# Patient Record
Sex: MALE | Race: WHITE | NOT HISPANIC OR LATINO | Employment: OTHER | URBAN - METROPOLITAN AREA
[De-identification: names, ages, dates, MRNs, and addresses within clinical notes are randomized per-mention and may not be internally consistent; named-entity substitution may affect disease eponyms.]

---

## 2017-01-05 ENCOUNTER — HOSPITAL ENCOUNTER (EMERGENCY)
Facility: HOSPITAL | Age: 82
Discharge: HOME | End: 2017-01-05
Attending: EMERGENCY MEDICINE | Admitting: EMERGENCY MEDICINE
Payer: MEDICARE

## 2017-01-05 VITALS
SYSTOLIC BLOOD PRESSURE: 156 MMHG | WEIGHT: 165 LBS | DIASTOLIC BLOOD PRESSURE: 67 MMHG | HEART RATE: 58 BPM | TEMPERATURE: 97.5 F | OXYGEN SATURATION: 96 % | RESPIRATION RATE: 18 BRPM

## 2017-01-05 DIAGNOSIS — K64.8 INTERNAL HEMORRHOID: ICD-10-CM

## 2017-01-05 DIAGNOSIS — K62.5 RECTAL BLEEDING: Primary | ICD-10-CM

## 2017-01-05 LAB
APTT PPP: 36 SECONDS (ref 24–36)
BASOPHILS # BLD AUTO: 0 THOUSANDS/ΜL (ref 0–0.1)
BASOPHILS NFR BLD AUTO: 0 % (ref 0–1)
EOSINOPHIL # BLD AUTO: 0.04 THOUSAND/ΜL (ref 0–0.61)
EOSINOPHIL NFR BLD AUTO: 1 % (ref 0–6)
ERYTHROCYTE [DISTWIDTH] IN BLOOD BY AUTOMATED COUNT: 13.1 % (ref 11.6–15.1)
HCT VFR BLD AUTO: 35.9 % (ref 36.5–49.3)
HGB BLD-MCNC: 12.5 G/DL (ref 12–17)
INR PPP: 2.28 (ref 0.86–1.16)
LYMPHOCYTES # BLD AUTO: 0.73 THOUSANDS/ΜL (ref 0.6–4.47)
LYMPHOCYTES NFR BLD AUTO: 15 % (ref 14–44)
MCH RBC QN AUTO: 32.3 PG (ref 26.8–34.3)
MCHC RBC AUTO-ENTMCNC: 34.8 G/DL (ref 31.4–37.4)
MCV RBC AUTO: 93 FL (ref 82–98)
MONOCYTES # BLD AUTO: 0.35 THOUSAND/ΜL (ref 0.17–1.22)
MONOCYTES NFR BLD AUTO: 7 % (ref 4–12)
NEUTROPHILS # BLD AUTO: 3.82 THOUSANDS/ΜL (ref 1.85–7.62)
NEUTS SEG NFR BLD AUTO: 77 % (ref 43–75)
NRBC BLD AUTO-RTO: 0 /100 WBCS
PLATELET # BLD AUTO: 198 THOUSANDS/UL (ref 149–390)
PMV BLD AUTO: 9.2 FL (ref 8.9–12.7)
PROTHROMBIN TIME: 24.8 SECONDS (ref 12–14.3)
RBC # BLD AUTO: 3.87 MILLION/UL (ref 3.88–5.62)
WBC # BLD AUTO: 4.96 THOUSAND/UL (ref 4.31–10.16)

## 2017-01-05 PROCEDURE — 85610 PROTHROMBIN TIME: CPT | Performed by: EMERGENCY MEDICINE

## 2017-01-05 PROCEDURE — 36415 COLL VENOUS BLD VENIPUNCTURE: CPT | Performed by: EMERGENCY MEDICINE

## 2017-01-05 PROCEDURE — 85025 COMPLETE CBC W/AUTO DIFF WBC: CPT | Performed by: EMERGENCY MEDICINE

## 2017-01-05 PROCEDURE — 99285 EMERGENCY DEPT VISIT HI MDM: CPT

## 2017-01-05 PROCEDURE — 85730 THROMBOPLASTIN TIME PARTIAL: CPT | Performed by: EMERGENCY MEDICINE

## 2017-01-13 ENCOUNTER — APPOINTMENT (OUTPATIENT)
Dept: LAB | Facility: CLINIC | Age: 82
End: 2017-01-13
Payer: MEDICARE

## 2017-01-13 ENCOUNTER — TRANSCRIBE ORDERS (OUTPATIENT)
Dept: LAB | Facility: CLINIC | Age: 82
End: 2017-01-13

## 2017-01-13 ENCOUNTER — GENERIC CONVERSION - ENCOUNTER (OUTPATIENT)
Dept: OTHER | Facility: OTHER | Age: 82
End: 2017-01-13

## 2017-01-13 DIAGNOSIS — I48.0 PAROXYSMAL ATRIAL FIBRILLATION (HCC): Primary | ICD-10-CM

## 2017-01-13 LAB
INR PPP: 1.95 (ref 0.86–1.16)
PROTHROMBIN TIME: 22.1 SECONDS (ref 12–14.3)

## 2017-01-13 PROCEDURE — 36415 COLL VENOUS BLD VENIPUNCTURE: CPT

## 2017-01-13 PROCEDURE — 85610 PROTHROMBIN TIME: CPT

## 2017-01-16 ENCOUNTER — GENERIC CONVERSION - ENCOUNTER (OUTPATIENT)
Dept: OTHER | Facility: OTHER | Age: 82
End: 2017-01-16

## 2017-01-18 ENCOUNTER — GENERIC CONVERSION - ENCOUNTER (OUTPATIENT)
Dept: OTHER | Facility: OTHER | Age: 82
End: 2017-01-18

## 2017-01-19 ENCOUNTER — ALLSCRIPTS OFFICE VISIT (OUTPATIENT)
Dept: OTHER | Facility: OTHER | Age: 82
End: 2017-01-19

## 2017-01-26 ENCOUNTER — ALLSCRIPTS OFFICE VISIT (OUTPATIENT)
Dept: OTHER | Facility: OTHER | Age: 82
End: 2017-01-26

## 2017-01-30 ENCOUNTER — GENERIC CONVERSION - ENCOUNTER (OUTPATIENT)
Dept: OTHER | Facility: OTHER | Age: 82
End: 2017-01-30

## 2017-01-30 ENCOUNTER — APPOINTMENT (OUTPATIENT)
Dept: LAB | Facility: CLINIC | Age: 82
End: 2017-01-30
Payer: MEDICARE

## 2017-01-30 DIAGNOSIS — I48.0 PAROXYSMAL ATRIAL FIBRILLATION (HCC): ICD-10-CM

## 2017-01-30 LAB
INR PPP: 2.95 (ref 0.86–1.16)
PROTHROMBIN TIME: 30.2 SECONDS (ref 12–14.3)

## 2017-01-30 PROCEDURE — 85610 PROTHROMBIN TIME: CPT

## 2017-01-30 PROCEDURE — 36415 COLL VENOUS BLD VENIPUNCTURE: CPT

## 2017-01-31 ENCOUNTER — GENERIC CONVERSION - ENCOUNTER (OUTPATIENT)
Dept: OTHER | Facility: OTHER | Age: 82
End: 2017-01-31

## 2017-02-13 ENCOUNTER — GENERIC CONVERSION - ENCOUNTER (OUTPATIENT)
Dept: OTHER | Facility: OTHER | Age: 82
End: 2017-02-13

## 2017-02-13 ENCOUNTER — APPOINTMENT (OUTPATIENT)
Dept: LAB | Facility: CLINIC | Age: 82
End: 2017-02-13
Payer: MEDICARE

## 2017-02-13 DIAGNOSIS — I48.0 PAROXYSMAL ATRIAL FIBRILLATION (HCC): ICD-10-CM

## 2017-02-13 LAB
INR PPP: 1.84 (ref 0.86–1.16)
PROTHROMBIN TIME: 21.1 SECONDS (ref 12–14.3)

## 2017-02-13 PROCEDURE — 85610 PROTHROMBIN TIME: CPT

## 2017-02-13 PROCEDURE — 36415 COLL VENOUS BLD VENIPUNCTURE: CPT

## 2017-02-14 ENCOUNTER — GENERIC CONVERSION - ENCOUNTER (OUTPATIENT)
Dept: OTHER | Facility: OTHER | Age: 82
End: 2017-02-14

## 2017-02-16 DIAGNOSIS — I48.0 PAROXYSMAL ATRIAL FIBRILLATION (HCC): ICD-10-CM

## 2017-02-27 ENCOUNTER — APPOINTMENT (OUTPATIENT)
Dept: LAB | Facility: CLINIC | Age: 82
End: 2017-02-27
Payer: MEDICARE

## 2017-02-27 DIAGNOSIS — I48.0 PAROXYSMAL ATRIAL FIBRILLATION (HCC): ICD-10-CM

## 2017-02-27 LAB
INR PPP: 1.87 (ref 0.86–1.16)
PROTHROMBIN TIME: 21.4 SECONDS (ref 12–14.3)

## 2017-02-27 PROCEDURE — 36415 COLL VENOUS BLD VENIPUNCTURE: CPT

## 2017-02-27 PROCEDURE — 85610 PROTHROMBIN TIME: CPT

## 2017-02-28 ENCOUNTER — GENERIC CONVERSION - ENCOUNTER (OUTPATIENT)
Dept: OTHER | Facility: OTHER | Age: 82
End: 2017-02-28

## 2017-03-20 ENCOUNTER — GENERIC CONVERSION - ENCOUNTER (OUTPATIENT)
Dept: OTHER | Facility: OTHER | Age: 82
End: 2017-03-20

## 2017-03-20 ENCOUNTER — APPOINTMENT (OUTPATIENT)
Dept: LAB | Facility: CLINIC | Age: 82
End: 2017-03-20
Payer: MEDICARE

## 2017-03-20 DIAGNOSIS — I48.0 PAROXYSMAL ATRIAL FIBRILLATION (HCC): ICD-10-CM

## 2017-03-20 LAB
INR PPP: 2.86 (ref 0.86–1.16)
PROTHROMBIN TIME: 29.5 SECONDS (ref 12–14.3)

## 2017-03-20 PROCEDURE — 85610 PROTHROMBIN TIME: CPT

## 2017-03-20 PROCEDURE — 36415 COLL VENOUS BLD VENIPUNCTURE: CPT

## 2017-03-23 DIAGNOSIS — I48.0 PAROXYSMAL ATRIAL FIBRILLATION (HCC): ICD-10-CM

## 2017-04-04 ENCOUNTER — TRANSCRIBE ORDERS (OUTPATIENT)
Dept: LAB | Facility: CLINIC | Age: 82
End: 2017-04-04

## 2017-04-04 ENCOUNTER — APPOINTMENT (OUTPATIENT)
Dept: LAB | Facility: CLINIC | Age: 82
End: 2017-04-04
Payer: MEDICARE

## 2017-04-04 ENCOUNTER — GENERIC CONVERSION - ENCOUNTER (OUTPATIENT)
Dept: OTHER | Facility: OTHER | Age: 82
End: 2017-04-04

## 2017-04-04 DIAGNOSIS — E78.5 HYPERLIPIDEMIA: ICD-10-CM

## 2017-04-04 DIAGNOSIS — I48.0 PAROXYSMAL ATRIAL FIBRILLATION (HCC): ICD-10-CM

## 2017-04-04 DIAGNOSIS — I10 ESSENTIAL (PRIMARY) HYPERTENSION: ICD-10-CM

## 2017-04-04 DIAGNOSIS — M05.9 SEROPOSITIVE RHEUMATOID ARTHRITIS (HCC): Primary | ICD-10-CM

## 2017-04-04 DIAGNOSIS — E11.9 TYPE 2 DIABETES MELLITUS WITHOUT COMPLICATIONS (HCC): ICD-10-CM

## 2017-04-04 LAB
ALBUMIN SERPL BCP-MCNC: 3.1 G/DL (ref 3.5–5)
ALP SERPL-CCNC: 63 U/L (ref 46–116)
ALT SERPL W P-5'-P-CCNC: 27 U/L (ref 12–78)
ANION GAP SERPL CALCULATED.3IONS-SCNC: 8 MMOL/L (ref 4–13)
AST SERPL W P-5'-P-CCNC: 17 U/L (ref 5–45)
BASOPHILS # BLD AUTO: 0.01 THOUSANDS/ΜL (ref 0–0.1)
BASOPHILS NFR BLD AUTO: 0 % (ref 0–1)
BILIRUB SERPL-MCNC: 0.56 MG/DL (ref 0.2–1)
BUN SERPL-MCNC: 22 MG/DL (ref 5–25)
CALCIUM SERPL-MCNC: 8.5 MG/DL (ref 8.3–10.1)
CHLORIDE SERPL-SCNC: 106 MMOL/L (ref 100–108)
CHOLEST SERPL-MCNC: 134 MG/DL (ref 50–200)
CO2 SERPL-SCNC: 28 MMOL/L (ref 21–32)
CREAT SERPL-MCNC: 0.9 MG/DL (ref 0.6–1.3)
EOSINOPHIL # BLD AUTO: 0.11 THOUSAND/ΜL (ref 0–0.61)
EOSINOPHIL NFR BLD AUTO: 2 % (ref 0–6)
ERYTHROCYTE [DISTWIDTH] IN BLOOD BY AUTOMATED COUNT: 13 % (ref 11.6–15.1)
EST. AVERAGE GLUCOSE BLD GHB EST-MCNC: 137 MG/DL
GFR SERPL CREATININE-BSD FRML MDRD: >60 ML/MIN/1.73SQ M
GLUCOSE P FAST SERPL-MCNC: 108 MG/DL (ref 65–99)
HBA1C MFR BLD: 6.4 % (ref 4.2–6.3)
HCT VFR BLD AUTO: 37.2 % (ref 36.5–49.3)
HDLC SERPL-MCNC: 75 MG/DL (ref 40–60)
HGB BLD-MCNC: 12.8 G/DL (ref 12–17)
INR PPP: 2.6 (ref 0.86–1.16)
LDLC SERPL CALC-MCNC: 52 MG/DL (ref 0–100)
LYMPHOCYTES # BLD AUTO: 1.1 THOUSANDS/ΜL (ref 0.6–4.47)
LYMPHOCYTES NFR BLD AUTO: 20 % (ref 14–44)
MCH RBC QN AUTO: 32.7 PG (ref 26.8–34.3)
MCHC RBC AUTO-ENTMCNC: 34.4 G/DL (ref 31.4–37.4)
MCV RBC AUTO: 95 FL (ref 82–98)
MONOCYTES # BLD AUTO: 0.51 THOUSAND/ΜL (ref 0.17–1.22)
MONOCYTES NFR BLD AUTO: 9 % (ref 4–12)
NEUTROPHILS # BLD AUTO: 3.7 THOUSANDS/ΜL (ref 1.85–7.62)
NEUTS SEG NFR BLD AUTO: 69 % (ref 43–75)
NRBC BLD AUTO-RTO: 0 /100 WBCS
PLATELET # BLD AUTO: 192 THOUSANDS/UL (ref 149–390)
PMV BLD AUTO: 9.5 FL (ref 8.9–12.7)
POTASSIUM SERPL-SCNC: 4 MMOL/L (ref 3.5–5.3)
PROT SERPL-MCNC: 6.3 G/DL (ref 6.4–8.2)
PROTHROMBIN TIME: 27.4 SECONDS (ref 12–14.3)
RBC # BLD AUTO: 3.92 MILLION/UL (ref 3.88–5.62)
SODIUM SERPL-SCNC: 142 MMOL/L (ref 136–145)
TRIGL SERPL-MCNC: 37 MG/DL
WBC # BLD AUTO: 5.44 THOUSAND/UL (ref 4.31–10.16)

## 2017-04-04 PROCEDURE — 80053 COMPREHEN METABOLIC PANEL: CPT

## 2017-04-04 PROCEDURE — 36415 COLL VENOUS BLD VENIPUNCTURE: CPT

## 2017-04-04 PROCEDURE — 80061 LIPID PANEL: CPT

## 2017-04-04 PROCEDURE — 85610 PROTHROMBIN TIME: CPT

## 2017-04-04 PROCEDURE — 85025 COMPLETE CBC W/AUTO DIFF WBC: CPT

## 2017-04-04 PROCEDURE — 83036 HEMOGLOBIN GLYCOSYLATED A1C: CPT

## 2017-04-07 DIAGNOSIS — I10 ESSENTIAL (PRIMARY) HYPERTENSION: ICD-10-CM

## 2017-04-07 DIAGNOSIS — I48.0 PAROXYSMAL ATRIAL FIBRILLATION (HCC): ICD-10-CM

## 2017-04-07 DIAGNOSIS — E11.9 TYPE 2 DIABETES MELLITUS WITHOUT COMPLICATIONS (HCC): ICD-10-CM

## 2017-04-07 DIAGNOSIS — E78.5 HYPERLIPIDEMIA: ICD-10-CM

## 2017-04-11 ENCOUNTER — ALLSCRIPTS OFFICE VISIT (OUTPATIENT)
Dept: OTHER | Facility: OTHER | Age: 82
End: 2017-04-11

## 2017-04-20 ENCOUNTER — GENERIC CONVERSION - ENCOUNTER (OUTPATIENT)
Dept: OTHER | Facility: OTHER | Age: 82
End: 2017-04-20

## 2017-04-20 ENCOUNTER — APPOINTMENT (OUTPATIENT)
Dept: LAB | Facility: CLINIC | Age: 82
End: 2017-04-20
Payer: MEDICARE

## 2017-04-20 DIAGNOSIS — I48.0 PAROXYSMAL ATRIAL FIBRILLATION (HCC): ICD-10-CM

## 2017-04-20 LAB
INR PPP: 3.19 (ref 0.86–1.16)
PROTHROMBIN TIME: 32 SECONDS (ref 12–14.3)

## 2017-04-20 PROCEDURE — 36415 COLL VENOUS BLD VENIPUNCTURE: CPT

## 2017-04-20 PROCEDURE — 85610 PROTHROMBIN TIME: CPT

## 2017-05-11 ENCOUNTER — GENERIC CONVERSION - ENCOUNTER (OUTPATIENT)
Dept: OTHER | Facility: OTHER | Age: 82
End: 2017-05-11

## 2017-05-11 ENCOUNTER — APPOINTMENT (OUTPATIENT)
Dept: LAB | Facility: CLINIC | Age: 82
End: 2017-05-11
Payer: MEDICARE

## 2017-05-11 DIAGNOSIS — I48.0 PAROXYSMAL ATRIAL FIBRILLATION (HCC): ICD-10-CM

## 2017-05-11 LAB
INR PPP: 2.46 (ref 0.86–1.16)
PROTHROMBIN TIME: 27 SECONDS (ref 12.1–14.4)

## 2017-05-11 PROCEDURE — 36415 COLL VENOUS BLD VENIPUNCTURE: CPT

## 2017-05-11 PROCEDURE — 85610 PROTHROMBIN TIME: CPT

## 2017-05-17 ENCOUNTER — GENERIC CONVERSION - ENCOUNTER (OUTPATIENT)
Dept: OTHER | Facility: OTHER | Age: 82
End: 2017-05-17

## 2017-05-19 ENCOUNTER — GENERIC CONVERSION - ENCOUNTER (OUTPATIENT)
Dept: OTHER | Facility: OTHER | Age: 82
End: 2017-05-19

## 2017-06-01 ENCOUNTER — APPOINTMENT (OUTPATIENT)
Dept: LAB | Facility: CLINIC | Age: 82
End: 2017-06-01
Payer: MEDICARE

## 2017-06-01 ENCOUNTER — GENERIC CONVERSION - ENCOUNTER (OUTPATIENT)
Dept: OTHER | Facility: OTHER | Age: 82
End: 2017-06-01

## 2017-06-01 DIAGNOSIS — I48.0 PAROXYSMAL ATRIAL FIBRILLATION (HCC): ICD-10-CM

## 2017-06-01 LAB
INR PPP: 2.64 (ref 0.86–1.16)
PROTHROMBIN TIME: 28.5 SECONDS (ref 12.1–14.4)

## 2017-06-01 PROCEDURE — 85610 PROTHROMBIN TIME: CPT

## 2017-06-01 PROCEDURE — 36415 COLL VENOUS BLD VENIPUNCTURE: CPT

## 2017-06-15 DIAGNOSIS — I48.0 PAROXYSMAL ATRIAL FIBRILLATION (HCC): ICD-10-CM

## 2017-06-22 ENCOUNTER — GENERIC CONVERSION - ENCOUNTER (OUTPATIENT)
Dept: OTHER | Facility: OTHER | Age: 82
End: 2017-06-22

## 2017-06-22 ENCOUNTER — APPOINTMENT (OUTPATIENT)
Dept: LAB | Facility: CLINIC | Age: 82
End: 2017-06-22
Payer: MEDICARE

## 2017-06-22 DIAGNOSIS — I48.0 PAROXYSMAL ATRIAL FIBRILLATION (HCC): ICD-10-CM

## 2017-06-22 PROCEDURE — 36415 COLL VENOUS BLD VENIPUNCTURE: CPT | Performed by: INTERNAL MEDICINE

## 2017-06-22 PROCEDURE — 85610 PROTHROMBIN TIME: CPT | Performed by: INTERNAL MEDICINE

## 2017-07-04 DIAGNOSIS — I48.0 PAROXYSMAL ATRIAL FIBRILLATION (HCC): ICD-10-CM

## 2017-07-04 DIAGNOSIS — E11.9 TYPE 2 DIABETES MELLITUS WITHOUT COMPLICATIONS (HCC): ICD-10-CM

## 2017-07-06 ENCOUNTER — APPOINTMENT (OUTPATIENT)
Dept: LAB | Facility: CLINIC | Age: 82
End: 2017-07-06
Payer: MEDICARE

## 2017-07-06 ENCOUNTER — TRANSCRIBE ORDERS (OUTPATIENT)
Dept: LAB | Facility: CLINIC | Age: 82
End: 2017-07-06

## 2017-07-06 DIAGNOSIS — E78.5 OTHER AND UNSPECIFIED HYPERLIPIDEMIA: Primary | ICD-10-CM

## 2017-07-06 DIAGNOSIS — E11.9 TYPE 2 DIABETES MELLITUS WITHOUT COMPLICATIONS (HCC): ICD-10-CM

## 2017-07-06 DIAGNOSIS — M05.9 SEROPOSITIVE RHEUMATOID ARTHRITIS (HCC): ICD-10-CM

## 2017-07-06 LAB
ALBUMIN SERPL BCP-MCNC: 3.3 G/DL (ref 3.5–5)
ALP SERPL-CCNC: 64 U/L (ref 46–116)
ALT SERPL W P-5'-P-CCNC: 24 U/L (ref 12–78)
ANION GAP SERPL CALCULATED.3IONS-SCNC: 4 MMOL/L (ref 4–13)
AST SERPL W P-5'-P-CCNC: 19 U/L (ref 5–45)
BASOPHILS # BLD AUTO: 0.01 THOUSANDS/ΜL (ref 0–0.1)
BASOPHILS NFR BLD AUTO: 0 % (ref 0–1)
BILIRUB SERPL-MCNC: 0.37 MG/DL (ref 0.2–1)
BUN SERPL-MCNC: 19 MG/DL (ref 5–25)
CALCIUM SERPL-MCNC: 8.4 MG/DL (ref 8.3–10.1)
CHLORIDE SERPL-SCNC: 107 MMOL/L (ref 100–108)
CO2 SERPL-SCNC: 28 MMOL/L (ref 21–32)
CREAT SERPL-MCNC: 0.86 MG/DL (ref 0.6–1.3)
EOSINOPHIL # BLD AUTO: 0.08 THOUSAND/ΜL (ref 0–0.61)
EOSINOPHIL NFR BLD AUTO: 2 % (ref 0–6)
ERYTHROCYTE [DISTWIDTH] IN BLOOD BY AUTOMATED COUNT: 12.9 % (ref 11.6–15.1)
EST. AVERAGE GLUCOSE BLD GHB EST-MCNC: 148 MG/DL
GFR SERPL CREATININE-BSD FRML MDRD: >60 ML/MIN/1.73SQ M
GLUCOSE P FAST SERPL-MCNC: 112 MG/DL (ref 65–99)
HBA1C MFR BLD: 6.8 % (ref 4.2–6.3)
HCT VFR BLD AUTO: 37.2 % (ref 36.5–49.3)
HGB BLD-MCNC: 12.6 G/DL (ref 12–17)
LDLC SERPL DIRECT ASSAY-MCNC: 56 MG/DL (ref 0–100)
LYMPHOCYTES # BLD AUTO: 1.03 THOUSANDS/ΜL (ref 0.6–4.47)
LYMPHOCYTES NFR BLD AUTO: 21 % (ref 14–44)
MCH RBC QN AUTO: 31.9 PG (ref 26.8–34.3)
MCHC RBC AUTO-ENTMCNC: 33.9 G/DL (ref 31.4–37.4)
MCV RBC AUTO: 94 FL (ref 82–98)
MONOCYTES # BLD AUTO: 0.46 THOUSAND/ΜL (ref 0.17–1.22)
MONOCYTES NFR BLD AUTO: 9 % (ref 4–12)
NEUTROPHILS # BLD AUTO: 3.27 THOUSANDS/ΜL (ref 1.85–7.62)
NEUTS SEG NFR BLD AUTO: 68 % (ref 43–75)
NRBC BLD AUTO-RTO: 0 /100 WBCS
PLATELET # BLD AUTO: 193 THOUSANDS/UL (ref 149–390)
PMV BLD AUTO: 9.2 FL (ref 8.9–12.7)
POTASSIUM SERPL-SCNC: 3.8 MMOL/L (ref 3.5–5.3)
PROT SERPL-MCNC: 6.5 G/DL (ref 6.4–8.2)
RBC # BLD AUTO: 3.95 MILLION/UL (ref 3.88–5.62)
SODIUM SERPL-SCNC: 139 MMOL/L (ref 136–145)
WBC # BLD AUTO: 4.87 THOUSAND/UL (ref 4.31–10.16)

## 2017-07-06 PROCEDURE — 85025 COMPLETE CBC W/AUTO DIFF WBC: CPT

## 2017-07-06 PROCEDURE — 80053 COMPREHEN METABOLIC PANEL: CPT

## 2017-07-06 PROCEDURE — 83036 HEMOGLOBIN GLYCOSYLATED A1C: CPT

## 2017-07-06 PROCEDURE — 36415 COLL VENOUS BLD VENIPUNCTURE: CPT

## 2017-07-06 PROCEDURE — 83721 ASSAY OF BLOOD LIPOPROTEIN: CPT

## 2017-07-24 ENCOUNTER — APPOINTMENT (OUTPATIENT)
Dept: LAB | Facility: CLINIC | Age: 82
End: 2017-07-24
Payer: MEDICARE

## 2017-07-24 ENCOUNTER — GENERIC CONVERSION - ENCOUNTER (OUTPATIENT)
Dept: OTHER | Facility: OTHER | Age: 82
End: 2017-07-24

## 2017-07-24 DIAGNOSIS — I48.0 PAROXYSMAL ATRIAL FIBRILLATION (HCC): ICD-10-CM

## 2017-07-24 LAB
INR PPP: 1.8 (ref 0.86–1.16)
PROTHROMBIN TIME: 21 SECONDS (ref 12.1–14.4)

## 2017-07-24 PROCEDURE — 36415 COLL VENOUS BLD VENIPUNCTURE: CPT

## 2017-07-24 PROCEDURE — 85610 PROTHROMBIN TIME: CPT

## 2017-08-08 ENCOUNTER — GENERIC CONVERSION - ENCOUNTER (OUTPATIENT)
Dept: OTHER | Facility: OTHER | Age: 82
End: 2017-08-08

## 2017-08-08 ENCOUNTER — APPOINTMENT (OUTPATIENT)
Dept: LAB | Facility: CLINIC | Age: 82
End: 2017-08-08
Payer: MEDICARE

## 2017-08-08 DIAGNOSIS — I48.0 PAROXYSMAL ATRIAL FIBRILLATION (HCC): ICD-10-CM

## 2017-08-08 LAB
INR PPP: 2.51 (ref 0.86–1.16)
PROTHROMBIN TIME: 27.4 SECONDS (ref 12.1–14.4)

## 2017-08-08 PROCEDURE — 36415 COLL VENOUS BLD VENIPUNCTURE: CPT

## 2017-08-08 PROCEDURE — 85610 PROTHROMBIN TIME: CPT

## 2017-08-10 DIAGNOSIS — I48.0 PAROXYSMAL ATRIAL FIBRILLATION (HCC): ICD-10-CM

## 2017-08-28 ENCOUNTER — ALLSCRIPTS OFFICE VISIT (OUTPATIENT)
Dept: OTHER | Facility: OTHER | Age: 82
End: 2017-08-28

## 2017-09-08 ENCOUNTER — GENERIC CONVERSION - ENCOUNTER (OUTPATIENT)
Dept: OTHER | Facility: OTHER | Age: 82
End: 2017-09-08

## 2017-09-08 ENCOUNTER — APPOINTMENT (OUTPATIENT)
Dept: LAB | Facility: CLINIC | Age: 82
End: 2017-09-08
Payer: MEDICARE

## 2017-09-08 DIAGNOSIS — I48.0 PAROXYSMAL ATRIAL FIBRILLATION (HCC): ICD-10-CM

## 2017-09-08 LAB
INR PPP: 2.37 (ref 0.86–1.16)
PROTHROMBIN TIME: 26.2 SECONDS (ref 12.1–14.4)

## 2017-09-08 PROCEDURE — 36415 COLL VENOUS BLD VENIPUNCTURE: CPT

## 2017-09-08 PROCEDURE — 85610 PROTHROMBIN TIME: CPT

## 2017-09-14 DIAGNOSIS — I44.7 LEFT BUNDLE-BRANCH BLOCK: ICD-10-CM

## 2017-09-14 DIAGNOSIS — I10 ESSENTIAL (PRIMARY) HYPERTENSION: ICD-10-CM

## 2017-09-14 DIAGNOSIS — I48.0 PAROXYSMAL ATRIAL FIBRILLATION (HCC): ICD-10-CM

## 2017-09-14 DIAGNOSIS — E78.5 HYPERLIPIDEMIA: ICD-10-CM

## 2017-09-22 ENCOUNTER — TRANSCRIBE ORDERS (OUTPATIENT)
Dept: LAB | Facility: CLINIC | Age: 82
End: 2017-09-22

## 2017-09-22 ENCOUNTER — APPOINTMENT (OUTPATIENT)
Dept: LAB | Facility: CLINIC | Age: 82
End: 2017-09-22
Payer: MEDICARE

## 2017-09-22 DIAGNOSIS — C61 MALIGNANT NEOPLASM OF PROSTATE (HCC): Primary | ICD-10-CM

## 2017-09-22 LAB — PSA SERPL-MCNC: 0.3 NG/ML (ref 0–4)

## 2017-09-22 PROCEDURE — 84153 ASSAY OF PSA TOTAL: CPT

## 2017-09-26 ENCOUNTER — GENERIC CONVERSION - ENCOUNTER (OUTPATIENT)
Dept: OTHER | Facility: OTHER | Age: 82
End: 2017-09-26

## 2017-10-12 ENCOUNTER — ALLSCRIPTS OFFICE VISIT (OUTPATIENT)
Dept: OTHER | Facility: OTHER | Age: 82
End: 2017-10-12

## 2017-10-13 ENCOUNTER — GENERIC CONVERSION - ENCOUNTER (OUTPATIENT)
Dept: OTHER | Facility: OTHER | Age: 82
End: 2017-10-13

## 2017-10-13 ENCOUNTER — APPOINTMENT (OUTPATIENT)
Dept: LAB | Facility: CLINIC | Age: 82
End: 2017-10-13
Payer: MEDICARE

## 2017-10-13 DIAGNOSIS — I48.0 PAROXYSMAL ATRIAL FIBRILLATION (HCC): ICD-10-CM

## 2017-10-13 LAB
INR PPP: 2.27 (ref 0.86–1.16)
PROTHROMBIN TIME: 25.3 SECONDS (ref 12.1–14.4)

## 2017-10-13 PROCEDURE — 36415 COLL VENOUS BLD VENIPUNCTURE: CPT

## 2017-10-13 PROCEDURE — 85610 PROTHROMBIN TIME: CPT

## 2017-10-14 NOTE — PROGRESS NOTES
Assessment  Assessed    1  Dyslipidemia (272 4) (E78 5)   2  Hypertension (401 9) (I10)   3  Left bundle-branch block (426 3) (I44 7)   4  Paroxysmal atrial fibrillation (427 31) (I48 0)    Plan  Dyslipidemia, Hypertension, Left bundle-branch block, Paroxysmal atrial fibrillation    · ECHO COMPLETE WITH CONTRAST IF INDICATED; Status:Active; Requested  for:12Oct2017;    Perform: [facility]; [id number]; Last Updated By:Juan Manuel Ott; 10/12/2017 3:57:42 PM;Ordered; For:Dyslipidemia, Hypertension, Left bundle-branch block, Paroxysmal atrial fibrillation; Ordered By:Rocael Mccollum;   · Follow-up Visit in 8 Months Evaluation and Treatment  Follow-up  Status: Complete   Done: 64GAB8139   Ordered; For: Dyslipidemia, Hypertension, Left bundle-branch block, Paroxysmal atrial fibrillation; Ordered By: Varinder Wilcox Performed:  Due: 19MYO1976; Last Updated By: Nelson Broderick; 10/12/2017 3:52:34 PM  Health Maintenance, Hypertension, Paroxysmal atrial fibrillation    · EKG/ECG- POC; Status:Complete;   Done: 60JRT5943   Perform: In Office; Due:10Zck5725; Last Updated Blane Stoll; 10/12/2017 3:42:41 PM;Ordered;For:Health Maintenance, Hypertension, Paroxysmal atrial fibrillation; Ordered By:Modesta Mccollum;    Discussion/Summary  Cardiology Discussion Summary Free Text Note Form [facility] Records:   He's currently maintaining sinus rhythm area tolerating Coumadin well with no adverse bleeding events  Blood pressures been well-controlled he provided a home log with good numbers  Cholesterol is well controlled on his current regimen  There's been no syncope or palpitations with history of bundle branch block during encouraged him to remain physically active no further cardiac testing is needed at this time  He'll be due for an echo in 2018  Chief Complaint  Chief Complaint Free Text Note Form: Patient is here today for 6 mo f/u  Patient has no Cardiac Complaints but c/o bulging veins    EKG today        History of Present Illness  Cardiology [facility] Free Text Note Form [facility]: Mr Josselin Aguayo Is a very pleasant 27-year-old gentleman who is here today to transition to my practice  From a cardiac standpoint he had an episode of atrial fibrillation that was short lived  He is now maintaining sinus rhythm  He has been on anticoagulation including Pradaxa most recently Coumadin  He has had some difficulty with regulating his INR per the patient report  There is been an episode of rectal bleeding which has since resolved  He also has a history of left bundle branch block  He denies any significant symptoms at this visit  There's been no exertional chest pain or pressure, dyspnea, lightheadedness, dizziness, or syncope  He's quite physically active for his age and has a good functional capacity  He's been sleeping well area there's been no lower extremity edema, PND, orthopnea  presents today for routine scheduled follow-up visit to discuss atrial fibrillation  He's been tolerating Coumadin well he was transitioned off of the Pradaxa after GI distress  He is a good functional capacity for his age  He's out playing golf and the tells me that he walks 18 holes  Denies any chest pain, shortness of breath, palpitations, lightheadedness, dizziness, or syncope  Overall he's been doing well  Recent cholesterol was doing well  Review of Systems  Cardiology Male ROS:     Cardiac: No complaints of chest pain, no palpitations, no fainiting  Skin: No complaints of nonhealing sores or skin rash  Genitourinary: No complaints of recurrent urinary tract infections, frequent urination at night, difficult urination, blood in urine, kidney stones, loss of bladder control, no kidney or prostate problems, no erectile dysfunction  Psychological: No complaints of feeling depressed, anxiety, panic attacks, or difficulty concentrating     General: No complaints of trouble sleeping, lack of energy, fatigue, appetite changes, weight changes, fever, frequent infections, or night sweats  Respiratory: No complaints of shortness of breath, cough with sputum, or wheezing  HEENT: No complaints of serious problems, hearing problems, nose problems, throat problems, or snoring  Gastrointestinal: No complaints of liver problems, nausea, vomiting, heartburn, constipation, bloody stools, diarrhea, problems swallowing, adbominal pain, or rectal bleeding  Hematologic: No complaints of bleeding disorders, anemia, blood clots, or excessive brusing  Neurological: No complaints of numbness, tingling, dizziness, weakness, seizures, headaches, syncope or fainting, AM fatigue, daytime sleepiness, no witnessed apnea episodes  Musculoskeletal: No complaints of arthritis, back pain, or painfull swelling  Active Problems  Problems    1  Arthritis (716 90) (M19 90)   2  Benign prostatic hypertrophy (600 00) (N40 0)   3  Chronic constipation (564 00) (K59 09)   4  Diabetes mellitus type II, controlled (250 00) (E11 9)   5  Diverticulosis (562 10) (K57 90)   6  Dyslipidemia (272 4) (E78 5)   7  Edema (782 3) (R60 9)   8  Hypertension (401 9) (I10)   9  Left bundle-branch block (426 3) (I44 7)   10  Need for diphtheria-tetanus-pertussis (Tdap) vaccine, adult/adolescent (V06 1) (Z23)   11  Paroxysmal atrial fibrillation (427 31) (I48 0)   12  Rheumatoid arthritis (714 0) (M06 9)   13  Screening for genitourinary condition (V81 6) (Z13 89)   14  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)   15  Venous insufficiency (459 81) (I87 2)    Past Medical History  Problems    1  History of basal cell carcinoma (V10 83) (Z85 828)   2  History of malignant neoplasm of prostate (V10 46) (Z85 46)  Active Problems And Past Medical History Reviewed: The active problems and past medical history were reviewed and updated today  Surgical History  Problems    1  History of Cataract Surgery   2  History of Cath Stent Placement   3   History of Hemorrhoidectomy   4  History of Hernia Repair   5  History of Prostate Surgery  Surgical History Reviewed: The surgical history was reviewed and updated today  Family History  Mother    1  Family history of Diabetes Mellitus (V18 0)   2  Family history of Lung Cancer (V16 1)  Father    3  Family history of Heart Disease (V17 49)   4  Family history of Stroke Syndrome (V17 1)  Family History Reviewed: The family history was reviewed and updated today  Social History  Problems    · Being A Social Drinker   · Denied: History of Drug Use   · Denied: History of Never A Smoker   · Never A Smoker  Social History Reviewed: The social history was reviewed and updated today  Current Meds   1  AmLODIPine Besylate 5 MG Oral Tablet; Take 1 tablet by mouth  daily; Therapy: 69SLC4279 to (Dasha Diaz)  Requested for: 18HJW1499; Last   Rx:34Xmt5029 Ordered   2  Artificial Tears 0 1-0 3 % Ophthalmic Solution; APPLY 1 DROP Twice daily; Therapy: 39JDX2410 to Recorded   3  Aspirin 81 MG TABS; TAKE 1 TABLET DAILY; Therapy: (Yas Kohli) to Recorded   4  Atorvastatin Calcium 10 MG Oral Tablet; TAKE 1 TABLET BY MOUTH AT  BEDTIME; Therapy: 99XOM4192 to (Dasha Diaz)  Requested for: 44BAD8349; Last   Rx:68Uon1790 Ordered   5  DilTIAZem HCl - 120 MG Oral Tablet; Take 1 tablet by mouth  daily; Therapy: 26QVU8552 to (Dasha Diaz)  Requested for: 05EMX9077; Last   Rx:03Jnt9904 Ordered   6  Dulcolax 5 MG Oral Tablet Delayed Release; TAKE 1 TABLET Bedtime PRN   CONSTIPATION; Therapy: 11Apr2017 to (Evaluate:08Oct2017); Last Rx:11Apr2017 Ordered   7  Fish Oil Concentrate 1000 MG Oral Capsule; TAKE 4 CAPSULE Daily; Therapy: 21Jan2013 to (Evaluate:23Nov2014) Recorded   8  Folic Acid 1 MG Oral Tablet; Take 2mg daily; Therapy: 21Jan2013 to Recorded   9  GNP Calcium 1200 7797-3495 MG-UNIT Oral Tablet Chewable; CHEW 1 TABLET DAILY; Therapy: 67NPN3350 to Recorded   10   Lisinopril 10 MG Oral Tablet; Take 1 tablet by mouth  daily; Therapy: 74Dwt7381 to (Bong Gomez)  Requested for: 62QKI3324; Last    Rx:52Iss4721 Ordered   11  Methotrexate 2 5 MG Oral Tablet; TAKE 8mg once a week; Therapy: 36Tpr5284 to  Requested for: 16NUV4643 Recorded   12  Multiple Vitamin TABS; TAKE 1 TABLET DAILY; Therapy: 21Jan2013 to (Evaluate:23Nov2014) Recorded   13  Vitamin D3 2000 UNIT Oral Capsule; take 1 capsule daily; Therapy: 45NVO2686 to (Last Rx:77Tfs6709) Ordered   14  Warfarin Sodium 5 MG Oral Tablet; take 1 tablet by mouth  daily as directed; Therapy: 21Jan2013 to (Evaluate:18Oct2017)  Requested for: 82Sxx6631; Last    Rx:94Oin7813 Ordered   15  Warfarin Sodium 7 5 MG Oral Tablet; Take 1 tablet by mouth  daily; Therapy: 02IQH7156 to (Evaluate:18Oct2017)  Requested for: 45Tiy8122; Last    Rx:59Mss2434 Ordered   16  Xalatan 0 005 % Ophthalmic Solution; INSTILL 1 DROP  INTO AFFECTED EYE(S) ONCE    DAILY AS DIRECTED; Therapy: 17KZM5195 to Recorded    Allergies  Medication    1  No Known Drug Allergies    Vitals  Vital Signs    Recorded: 92PHN5307 03:40PM   Heart Rate 72, Apical   Systolic 731, LUE, Sitting   Diastolic 62, LUE, Sitting   Height 5 ft 10 in   Weight 175 lb 7 oz   BMI Calculated 25 17   BSA Calculated 1 97     Physical Exam    Constitutional   General appearance: No acute distress, well appearing and well nourished  Eyes   Conjunctiva and Sclera examination: Conjunctiva pink, sclera anicteric  Ears, Nose, Mouth, and Throat - Oropharynx: Clear, nares are clear, mucous membranes are moist    Neck   Neck and thyroid: Normal, supple, trachea midline, no thyromegaly  Pulmonary   Respiratory effort: No increased work of breathing or signs of respiratory distress  Auscultation of lungs: Clear to auscultation, no rales, no rhonchi, no wheezing, good air movement  Cardiovascular   Auscultation of heart: Abnormal   The heart rate was normal  The rhythm was regular   A grade 1 systolic murmur was heard at the RUSB  Carotid pulses: Normal, 2+ bilaterally  Peripheral vascular exam: Normal pulses throughout, no tenderness, erythema or swelling  Pedal pulses: Normal, 2+ bilaterally  Examination of extremities for edema and/or varicosities: Normal     Abdomen   Abdomen: Non-tender and no distention  Liver and spleen: No hepatomegaly or splenomegaly  Musculoskeletal Gait and station: Normal gait  -- Digits and nails: Normal without clubbing or cyanosis  -- Inspection/palpation of joints, bones, and muscles: Normal, ROM normal     Skin - Skin and subcutaneous tissue: Normal without rashes or lesions  Skin is warm and well perfused, normal turgor  Neurologic - Cranial nerves: II - XII intact  -- Speech: Normal     Psychiatric - Orientation to person, place, and time: Normal -- Mood and affect: Normal       Results/Data  ECG Report: Sinus rhythm first-degree AV block left bundle branch block      Future Appointments    Date/Time Provider Specialty Site   12/21/2017 09:20 SHIREEN Pena   Internal Medicine [facility] IM     Signatures   Electronically signed by : Ro Saucedo DO; Oct 12 2017  4:30PM EST                       (Author)

## 2017-10-19 DIAGNOSIS — I48.0 PAROXYSMAL ATRIAL FIBRILLATION (HCC): ICD-10-CM

## 2017-11-13 ENCOUNTER — APPOINTMENT (OUTPATIENT)
Dept: LAB | Facility: CLINIC | Age: 82
End: 2017-11-13
Payer: MEDICARE

## 2017-11-13 ENCOUNTER — GENERIC CONVERSION - ENCOUNTER (OUTPATIENT)
Dept: OTHER | Facility: OTHER | Age: 82
End: 2017-11-13

## 2017-11-13 DIAGNOSIS — I48.0 PAROXYSMAL ATRIAL FIBRILLATION (HCC): ICD-10-CM

## 2017-11-13 LAB
INR PPP: 2.04 (ref 0.86–1.16)
PROTHROMBIN TIME: 23.2 SECONDS (ref 12.1–14.4)

## 2017-11-13 PROCEDURE — 36415 COLL VENOUS BLD VENIPUNCTURE: CPT

## 2017-11-13 PROCEDURE — 85610 PROTHROMBIN TIME: CPT

## 2017-11-14 ENCOUNTER — GENERIC CONVERSION - ENCOUNTER (OUTPATIENT)
Dept: OTHER | Facility: OTHER | Age: 82
End: 2017-11-14

## 2017-11-23 DIAGNOSIS — I48.0 PAROXYSMAL ATRIAL FIBRILLATION (HCC): ICD-10-CM

## 2017-12-04 ENCOUNTER — GENERIC CONVERSION - ENCOUNTER (OUTPATIENT)
Dept: OTHER | Facility: OTHER | Age: 82
End: 2017-12-04

## 2017-12-04 ENCOUNTER — APPOINTMENT (OUTPATIENT)
Dept: LAB | Facility: CLINIC | Age: 82
End: 2017-12-04
Payer: MEDICARE

## 2017-12-04 DIAGNOSIS — I48.0 PAROXYSMAL ATRIAL FIBRILLATION (HCC): ICD-10-CM

## 2017-12-04 LAB
INR PPP: 1.8 (ref 0.86–1.16)
PROTHROMBIN TIME: 21 SECONDS (ref 12.1–14.4)

## 2017-12-04 PROCEDURE — 36415 COLL VENOUS BLD VENIPUNCTURE: CPT

## 2017-12-04 PROCEDURE — 85610 PROTHROMBIN TIME: CPT

## 2017-12-06 ENCOUNTER — GENERIC CONVERSION - ENCOUNTER (OUTPATIENT)
Dept: OTHER | Facility: OTHER | Age: 82
End: 2017-12-06

## 2017-12-18 ENCOUNTER — GENERIC CONVERSION - ENCOUNTER (OUTPATIENT)
Dept: OTHER | Facility: OTHER | Age: 82
End: 2017-12-18

## 2017-12-18 ENCOUNTER — TRANSCRIBE ORDERS (OUTPATIENT)
Dept: LAB | Facility: CLINIC | Age: 82
End: 2017-12-18

## 2017-12-18 ENCOUNTER — APPOINTMENT (OUTPATIENT)
Dept: LAB | Facility: CLINIC | Age: 82
End: 2017-12-18
Payer: MEDICARE

## 2017-12-18 DIAGNOSIS — I48.0 PAROXYSMAL ATRIAL FIBRILLATION (HCC): ICD-10-CM

## 2017-12-18 DIAGNOSIS — E11.9 TYPE 2 DIABETES MELLITUS WITHOUT COMPLICATIONS (HCC): ICD-10-CM

## 2017-12-18 DIAGNOSIS — M05.9 SEROPOSITIVE RHEUMATOID ARTHRITIS (HCC): Primary | ICD-10-CM

## 2017-12-18 LAB
ALBUMIN SERPL BCP-MCNC: 3.3 G/DL (ref 3.5–5)
ALP SERPL-CCNC: 56 U/L (ref 46–116)
ALT SERPL W P-5'-P-CCNC: 26 U/L (ref 12–78)
ANION GAP SERPL CALCULATED.3IONS-SCNC: 4 MMOL/L (ref 4–13)
AST SERPL W P-5'-P-CCNC: 17 U/L (ref 5–45)
BASOPHILS # BLD AUTO: 0.01 THOUSANDS/ΜL (ref 0–0.1)
BASOPHILS NFR BLD AUTO: 0 % (ref 0–1)
BILIRUB SERPL-MCNC: 0.49 MG/DL (ref 0.2–1)
BUN SERPL-MCNC: 18 MG/DL (ref 5–25)
CALCIUM SERPL-MCNC: 8.4 MG/DL (ref 8.3–10.1)
CHLORIDE SERPL-SCNC: 106 MMOL/L (ref 100–108)
CO2 SERPL-SCNC: 29 MMOL/L (ref 21–32)
CREAT SERPL-MCNC: 0.78 MG/DL (ref 0.6–1.3)
CREAT UR-MCNC: 46.8 MG/DL
EOSINOPHIL # BLD AUTO: 0.03 THOUSAND/ΜL (ref 0–0.61)
EOSINOPHIL NFR BLD AUTO: 1 % (ref 0–6)
ERYTHROCYTE [DISTWIDTH] IN BLOOD BY AUTOMATED COUNT: 12.9 % (ref 11.6–15.1)
EST. AVERAGE GLUCOSE BLD GHB EST-MCNC: 140 MG/DL
GFR SERPL CREATININE-BSD FRML MDRD: 79 ML/MIN/1.73SQ M
GLUCOSE P FAST SERPL-MCNC: 120 MG/DL (ref 65–99)
HBA1C MFR BLD: 6.5 % (ref 4.2–6.3)
HCT VFR BLD AUTO: 37.8 % (ref 36.5–49.3)
HGB BLD-MCNC: 13.1 G/DL (ref 12–17)
INR PPP: 2.5 (ref 0.86–1.16)
LDLC SERPL DIRECT ASSAY-MCNC: 61 MG/DL (ref 0–100)
LYMPHOCYTES # BLD AUTO: 0.97 THOUSANDS/ΜL (ref 0.6–4.47)
LYMPHOCYTES NFR BLD AUTO: 21 % (ref 14–44)
MCH RBC QN AUTO: 32.5 PG (ref 26.8–34.3)
MCHC RBC AUTO-ENTMCNC: 34.7 G/DL (ref 31.4–37.4)
MCV RBC AUTO: 94 FL (ref 82–98)
MICROALBUMIN UR-MCNC: <5 MG/L (ref 0–20)
MICROALBUMIN/CREAT 24H UR: <11 MG/G CREATININE (ref 0–30)
MONOCYTES # BLD AUTO: 0.39 THOUSAND/ΜL (ref 0.17–1.22)
MONOCYTES NFR BLD AUTO: 9 % (ref 4–12)
NEUTROPHILS # BLD AUTO: 3.15 THOUSANDS/ΜL (ref 1.85–7.62)
NEUTS SEG NFR BLD AUTO: 69 % (ref 43–75)
NRBC BLD AUTO-RTO: 0 /100 WBCS
PLATELET # BLD AUTO: 194 THOUSANDS/UL (ref 149–390)
PMV BLD AUTO: 9.1 FL (ref 8.9–12.7)
POTASSIUM SERPL-SCNC: 3.9 MMOL/L (ref 3.5–5.3)
PROT SERPL-MCNC: 6.6 G/DL (ref 6.4–8.2)
PROTHROMBIN TIME: 27.3 SECONDS (ref 12.1–14.4)
RBC # BLD AUTO: 4.03 MILLION/UL (ref 3.88–5.62)
SODIUM SERPL-SCNC: 139 MMOL/L (ref 136–145)
WBC # BLD AUTO: 4.56 THOUSAND/UL (ref 4.31–10.16)

## 2017-12-18 PROCEDURE — 82043 UR ALBUMIN QUANTITATIVE: CPT

## 2017-12-18 PROCEDURE — 36415 COLL VENOUS BLD VENIPUNCTURE: CPT

## 2017-12-18 PROCEDURE — 80053 COMPREHEN METABOLIC PANEL: CPT

## 2017-12-18 PROCEDURE — 83036 HEMOGLOBIN GLYCOSYLATED A1C: CPT

## 2017-12-18 PROCEDURE — 83721 ASSAY OF BLOOD LIPOPROTEIN: CPT

## 2017-12-18 PROCEDURE — 85025 COMPLETE CBC W/AUTO DIFF WBC: CPT

## 2017-12-18 PROCEDURE — 85610 PROTHROMBIN TIME: CPT

## 2017-12-18 PROCEDURE — 82570 ASSAY OF URINE CREATININE: CPT

## 2017-12-20 DIAGNOSIS — E11.9 TYPE 2 DIABETES MELLITUS WITHOUT COMPLICATIONS (HCC): ICD-10-CM

## 2017-12-20 DIAGNOSIS — I48.0 PAROXYSMAL ATRIAL FIBRILLATION (HCC): ICD-10-CM

## 2017-12-21 ENCOUNTER — ALLSCRIPTS OFFICE VISIT (OUTPATIENT)
Dept: OTHER | Facility: OTHER | Age: 82
End: 2017-12-21

## 2017-12-22 NOTE — PROGRESS NOTES
Assessment   1  Hypertension (401 9) (I10)   2  Diabetes mellitus type II, controlled (250 00) (E11 9)   3  Paroxysmal atrial fibrillation (427 31) (I48 0)   4  Rheumatoid arthritis (714 0) (M06 9)    Plan   Diabetes mellitus type II, controlled    · (1) BASIC METABOLIC PROFILE; Status:Active; Requested for:19Mar2018;    · (1) HEMOGLOBIN A1C; Status:Active; Requested for:19Mar2018;   Diabetes mellitus type II, controlled, SocHx: Never a smoker    · *VB - Foot Exam; Status:Complete;   Done: 51SQY2595 09:30AM  Dyslipidemia    · (1) LDL,DIRECT; Status:Active; Requested for:19Mar2018; Health Maintenance    · Begin a limited exercise program ; Status:Complete;   Done: 92RZR4802    Discussion/Summary   Discussion Summary:    Continue active follow-up through this office and with Rheumatology  Medical problems are well managed  Lifestyle is excellent  He was encouraged to call between visits and was given a lab slip to do a head of next visit  Chief Complaint   Chief Complaint Free Text Note Form: 4 month follow up  Socks and shoes removed for foot exam     Chief Complaint Chronic Condition [facility]: Patient is here today for follow up of chronic conditions described in HPI  History of Present Illness   HPI: Father Hima returns for his regular visit for chronic illness management  He remains a truly amazing 27-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, teaching, playing golf regularly, and enjoys an excellent quality of life    has occasional stiffness in his joints but no flares of his rheumatoid arthritis on review any just had his lab work ordered by Rheumatology head of his upcoming visit in January which was available for review and appears to be at baseline  brought in his home blood pressure readings which are well controlled and he has no orthostatic lightheadedness, no chest pain or shortness of breath     has a history of paroxysmal atrial fibrillation and sees Cardiology, and recent note was reviewed from his cardiologist  Plans continue Coumadin, and we did discuss the alternative Eliquis but he would rather not switch because INR is been very therapeutic, and the cost is much less  He is having no bleeding  reviewed his CBC, normal from December 18th, PT INR of 2 50, and also reviewed his recent lab work regarding diabetes including improved hemoglobin A1c of 6 5 improvement 6 8 on July 6, optimal glucose of 120, GFR 79 and normal CMP otherwise, LDL 61 on statin therapy without side effects  Urine microalbumin was negative and was also reviewed  is not reporting any new problems  are no deficits of activities of daily living  There is no cognitive deficit  In fact 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 languages and translates manuscripts across several languages  Active Problems   1  Arthritis (716 90) (M19 90)   2  Benign prostatic hypertrophy (600 00) (N40 0)   3  Chronic constipation (564 00) (K59 09)   4  Diabetes mellitus type II, controlled (250 00) (E11 9)   5  Diverticulosis (562 10) (K57 90)   6  Dyslipidemia (272 4) (E78 5)   7  Edema (782 3) (R60 9)   8  Hypertension (401 9) (I10)   9  Left bundle-branch block (426 3) (I44 7)   10  Paroxysmal atrial fibrillation (427 31) (I48 0)   11  Rheumatoid arthritis (714 0) (M06 9)   12  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)   13  Venous insufficiency (459 81) (I87 2)    Past Medical History   1  History of basal cell carcinoma (V10 83) (Z85 828)   2  History of malignant neoplasm of prostate (V10 46) (Z85 46)  Active Problems And Past Medical History Reviewed: The active problems and past medical history were reviewed and updated today  Surgical History   1  History of Cataract Surgery   2  History of Cath Stent Placement   3  History of Hemorrhoidectomy   4  History of Hernia Repair   5  History of Prostate Surgery    Family History   Mother    1  Family history of Diabetes Mellitus (V18 0)   2  Family history of Lung Cancer (V16 1)  Father    3  Family history of Heart Disease (V17 49)   4  Family history of Stroke Syndrome (V17 1)    Social History    · Being A Social Drinker   · Denied: History of Drug Use   · Never a smoker   · Denied: History of Never A Smoker  Social History Reviewed: The social history was reviewed and updated today  The social history was reviewed and is unchanged  Current Meds    1  AmLODIPine Besylate 5 MG Oral Tablet; Take 1 tablet by mouth  daily; Therapy: 45HQP0089 to (Cydne Shadow)  Requested for: 79SXV4126; Last Rx:01Dec2017     Ordered   2  Artificial Tears 0 1-0 3 % Ophthalmic Solution; APPLY 1 DROP Twice daily; Therapy: 74EJV1075 to Recorded   3  Aspirin 81 MG TABS; TAKE 1 TABLET DAILY; Therapy: (Tyrone Pac) to Recorded   4  Atorvastatin Calcium 10 MG Oral Tablet; TAKE 1 TABLET BY MOUTH AT  BEDTIME; Therapy: 84QLZ5236 to (Evaluate:01Mar2018)  Requested for: 61KLC8848; Last Rx:01Dec2017     Ordered   5  DilTIAZem HCl - 120 MG Oral Tablet; Take 1 tablet by mouth  daily; Therapy: 87DDD3350 to (Evaluate:01Mar2018)  Requested for: 14XDY4251; Last Rx:62Qas8869     Ordered   6  Dulcolax 5 MG Oral Tablet Delayed Release; TAKE 1 TABLET Bedtime PRN CONSTIPATION; Therapy: 04Tbk9747 to (Evaluate:08Oct2017); Last Rx:38Dvl6843 Ordered   7  Fish Oil Concentrate 1000 MG Oral Capsule; TAKE 4 CAPSULE Daily; Therapy: 21Jan2013 to (Evaluate:23Nov2014) Recorded   8  Folic Acid 1 MG Oral Tablet; Take 2mg daily; Therapy: 21Jan2013 to Recorded   9  GNP Calcium 1200 6041-5673 MG-UNIT Oral Tablet Chewable; CHEW 1 TABLET DAILY; Therapy: 58WCN5184 to Recorded   10  Lisinopril 10 MG Oral Tablet; Take 1 tablet by mouth  daily; Therapy: 42Ntq2943 to (Evaluate:01Mar2018)  Requested for: 32XEE8620; Last Rx:93Vrz4206      Ordered   11  Methotrexate 2 5 MG Oral Tablet; TAKE 8mg once a week;       Therapy: 79Gvs6802 to  Requested for: 58TKW9300 Recorded   12  Multiple Vitamin TABS; TAKE 1 TABLET DAILY; Therapy: 21Jan2013 to (Evaluate:23Nov2014) Recorded   13  Vitamin D3 2000 UNIT Oral Capsule; take 1 capsule daily; Therapy: 08TFM0250 to (Last Rx:09Oct2015) Ordered   14  Warfarin Sodium 5 MG Oral Tablet; take 1 tablet by mouth  daily as directed; Therapy: 21Jan2013 to (Tim Lorenzo)  Requested for: 21DOG3433; Last Rx:16Jou2897      Ordered   15  Warfarin Sodium 7 5 MG Oral Tablet; Take 1 tablet by mouth  daily; Therapy: 68KNK4081 to (Tim Lorenzo)  Requested for: 92ADO1537; Last Rx:73Emp6003      Ordered   16  Xalatan 0 005 % Ophthalmic Solution; INSTILL 1 DROP  INTO AFFECTED EYE(S) ONCE DAILY AS      DIRECTED; Therapy: 28TRA8826 to Recorded  Medication List Reviewed: The medication list was reviewed and updated today  Allergies   1  No Known Drug Allergies    Vitals   Vital Signs    Recorded: 21Dec2017 09:28AM   Heart Rate 67   Systolic 942, LUE, Sitting   Diastolic 50, LUE, Sitting   Height 5 ft 10 in   Weight 174 lb 6 oz   BMI Calculated 25 02   BSA Calculated 1 97   O2 Saturation 96     Physical Exam        Constitutional Vital signs stable, alert and oriented, well compensated hearing with hearing aids, appears much younger than stated age, no cognitive deficit, gait stable  There is no JVD  There are no carotid bruits in carotid pulses are normal  Lungs are clear  Cardiac: Regular rate rhythm, normal S1 and S2, no audible murmur, no S4 or S3  There is trace lower leg edema  Peripheral circulation intact including 2/2 posterior tibial dorsalis pedis pulses bilaterally  Diabetic Foot Screen: Normal        Socks and shoes removed, the Right Foot: the foot was normal, no swelling, no erythema  The toes on the right were normal       Socks and shoes removed, Left Foot: the foot was normal, no swelling, no erythema   The toes on the left were normal  Capillary refills findings on the right were delayed in the toes       Pulses:      2+ in the posterior tibialis on the right      2+ in the dorsalis pedis on the right  Pulses:      2+ in the posterior tibialis on the left      2+ in the dorsalis pedis on the left  Assign Risk Category: 1: No loss of protective sensation, deformity present  Impending risk  Results/Data   *VB - Foot Exam 91BTP0137 09:30AM Ayush Stevens      Test Name Result Flag Reference   FOOT Seble Pepe 53ZRO1117          Future Appointments      Date/Time Provider Specialty Site   03/26/2018 08:40 AM SHIREEN Rodriguez   Internal Medicine [facility] IM     Signatures    Electronically signed by : SHIREEN Banks ; Dec 21 2017 10:08AM EST                       (Author)

## 2018-01-05 ENCOUNTER — GENERIC CONVERSION - ENCOUNTER (OUTPATIENT)
Dept: OTHER | Facility: OTHER | Age: 83
End: 2018-01-05

## 2018-01-05 ENCOUNTER — APPOINTMENT (OUTPATIENT)
Dept: LAB | Facility: CLINIC | Age: 83
End: 2018-01-05
Payer: MEDICARE

## 2018-01-05 DIAGNOSIS — I48.0 PAROXYSMAL ATRIAL FIBRILLATION (HCC): ICD-10-CM

## 2018-01-05 LAB
INR PPP: 2.93 (ref 0.86–1.16)
PROTHROMBIN TIME: 31 SECONDS (ref 12.1–14.4)

## 2018-01-05 PROCEDURE — 85610 PROTHROMBIN TIME: CPT

## 2018-01-05 PROCEDURE — 36415 COLL VENOUS BLD VENIPUNCTURE: CPT

## 2018-01-10 NOTE — PROGRESS NOTES
REPORT NAME: Progress Notes Report  VISIT DATE: 11/13/2017  VISIT TIME: 4:04 PM EST  PATIENT NAME: Ania Mccracken  MEDICAL RECORD NUMBER: [id number]  YOB: 1927  AGE: 80  REFERRING PHYSICIAN: TAMEKA SINGH  SUPERVISING CLINICIAN: [facility] CARE PROVIDER: Mary Snell  PATIENT HOME ADDRESS: [address] [place] [address]  PATIENT HOME PHONE: [phone]  SOCIAL SECURITY NUMBER:   DIAGNOSIS 1: Unspecified atrial fibrillation / I48 91  DIAGNOSIS 2:   INR RANGE: 2 - 3  INR GOAL: 2 5  TREATMENT START DATE:   TREATMENT END DATE:   NEXT VISIT: 11/27/2017  [facility]  VISIT RESULTS  ENCOUNTER NUMBER:   TEST LOCATION: [facility]  TEST TYPE: Outside Lab (Venipuncture)  VISIT TYPE:   CURRENT INR: 2 04 PROTIME:   SPECIMEN COL AND RPT DATE: 11/13/2017 4:04 PM  EST  VITAL SIGNS  PULSE:  BP: / WEIGHT:  HEIGHT:  TEMP:   CURRENT ANTICOAGULANT DOSING SCHEDULE  DOSE SIZE: 5mg    ANTICOAGULANT TYPE: WARFARIN  DOSING REGIMEN  Sun       Mon       Tues      Wed       Thurs     Fri       Sat  Total/Wk  7 5       5         7 5       5         7 5       5         5         42 5  PATIENT MEDICATION INSTRUCTION: Yes  PATIENT NUTRITIONAL COUNSELING: No  PATIENT BRUISING INSTRUCTION: No  LAST EDUCATION DATE:   PREVIOUS VISIT INFORMATION  VISITDATE  INRGoal INR   Sun    Mon    Tues   Wed    Thurs  Fri    Sat  Total/wk  11/13/2017  2 5     2 04  7 5    5      7 5    5      7 5    5      5  42 5  10/13/2017  2 5     2 27  7 5    5      7 5    5      7 5    5      5  42 5  9/8/2017    2 5     2 3   7 5    5      7 5    5      7 5    5      5  42 5  8/8/2017    2 5     2 5   7 5    5      7 5    5      7 5    5      5  42 5  ADDITIONAL PREVIOUS VISIT INFORMATION  VISITDATE   PRIMARY RX               DOSE      CrCl  11/13/2017  WARFARIN                 5mg  10/13/2017  WARFARIN                 5mg  9/8/2017    WARFARIN                 5mg  8/8/2017    WARFARIN 5mg  OTHER CURRENT MEDICATIONS:  WARFARIN  PROGRESS NOTES: gave dosage to pt  retest inr in 2 to 3 wks  pt ate more  vitamin K vegetables this week   no missed doses  PATIENT INSTRUCTIONS:   TEST LOCATION: [facility], , ,   INBOUND LAB DATA:  Lab       Lab Value Col Date                 Rpt Date                 Lab  Reference Range  Electronically signed Jaziel Mckenzie on 11/13/2017 4:04 PM EST

## 2018-01-10 NOTE — RESULT NOTES
Verified Results  (1) PT WITH INR 21Anm8583 08:09AM Aniya Saenz Order Number: [id number]     Test Name Result Flag Reference   INR 1 80 H 0 86-1 16   PT 21 0 seconds H 12 1-14 4

## 2018-01-10 NOTE — RESULT NOTES
Verified Results  (1) PT WITH INR 50ZIU8119 08:12AM Natalee Patel Order Number: [id number]     Test Name Result Flag Reference   INR 2 04 H 0 86-1 16   PT 23 2 seconds H 12 1-14 4

## 2018-01-10 NOTE — RESULT NOTES
Verified Results  (1) PT WITH INR 87Klj7316 08:16AM Alice Brito Order Number: [id number]     Test Name Result Flag Reference   INR 2 37 H 0 86-1 16   PT 26 2 seconds H 12 1-14 4

## 2018-01-11 NOTE — PROGRESS NOTES
REPORT NAME: Patient Visit Summary Report   VISIT DATE: 12/30/2016  VISIT TIME: 10:01 AM EST  PATIENT NAME: Francisco Vasquez  MEDICAL RECORD NUMBER: [id number]  SOCIAL SECURITY NUMBER:   YOB: 1927  AGE: 80  REFERRING PHYSICIAN: ERIKA REYNOLDS DO  SUPERVISING CLINICIAN: Marylen Screen  HEALTH CARE PROFESSIONAL: Trang Viera  PATIENT HOME ADDRESS: [address] [place] [address], [address] [place] [address], [address] [place] [address]  PATIENT HOME PHONE: [phone]  DIAGNOSIS 1: Unspecified atrial fibrillation / I48 91  DIAGNOSIS 2:   DIAGNOSIS 3:   DIAGNOSIS 4:   INR RANGE: 2 - 3  INR GOAL: 2 5  TREATMENT START DATE:   TREATMENT END DATE:   NEXT VISIT: 1/16/2017  [facility]    VISIT RESULTS   ENCOUNTER NUMBER:   TEST LOCATION: [facility]  TEST TYPE: Outside Lab (Venipuncture)  VISIT TYPE: Phone Consult  CURRENT INR: 3 12 PROTIME: 31 5  SPECIMEN COL AND RPT DATE: 12/30/2016  10:01 AM EST    VITAL SIGNS  PULSE:  B/P:  WEIGHT:  HEIGHT:  TEMP:     CURRENT ANTICOAGULANT DOSING SCHEDULE  DOSE SIZE: 5mg    ANTICOAGULANT TYPE: WARFARIN  DOSING REGIMEN  Sun       Mon       Tues      Wed       Thurs     Fri       Sat  Total/Wk  5         7 5       5         7 5       5         7 5       5         42 5    PATIENT MEDICATION INSTRUCTION: Yes  PATIENT NUTRITIONAL COUNSELING: No  PATIENT BRUISING INSTRUCTION: No      LAST EDUCATION DATE:       PREVIOUS VISIT INFORMATION  VISITDATE   INR Goal  INR   Sun     Mon     Tues    Wed     Thurs   Fri  Sat     Total/wk  12/30/2016  2 5       3 12  5       7 5     5       7 5     5       7 5  5       42 5  12/13/2016  2 5       2 81  7 5     5       7 5     7 5     7 5     5  7 5     47 5  11/30/2016  2 5       1 9   7 5     5       7 5     7 5     7 5     5  7 5     47 5  10/31/2016  2 5       2 07  7 5     5       7 5     5       7 5     5  7 5     45    ADDITIONAL PREVIOUS VISIT INFORMATION  VISITDATE   PRIMARY RX               DOSE      CrCl  12/30/2016  WARFARIN                 5mg 12/13/2016  WARFARIN                 5mg                 11/30/2016  WARFARIN                 5mg                 10/31/2016  WARFARIN                 5mg                     OTHER CURRENT MEDICATIONS: WARFARIN      PROGRESS NOTES:     PATIENT INSTRUCTIONS: 1/3/17, tc pt, no med or diet changes  will decrease  dose, 5 mg m/w/f, 7 5 mg others, rech 2 weeks, 1/16  manny   ---- Additional Instructions entered on 1/5/2017 3:37 PM EST by Marbella Conner :  1/5/17, tc from pt, seen in er for rectal bleed, internal  hemmoriod, held warfarin last om    inr 2 2, will decrease dose, 7 5 mg  m/w/f, 5 mg others, rech 1/19  will see gi  manny  TEST LOCATION: [facility]    Electronically signed by:  Marbella Conner on 1/5/2017 at 3:37 PM EST

## 2018-01-11 NOTE — RESULT NOTES
Verified Results  (1) PT WITH INR [id number] 08:15AM Annieleiaellen Eduard Order Number: [id number]     Test Name Result Flag Reference   INR 2 27 H 0 86-1 16   PT 25 3 seconds H 12 1-14 4

## 2018-01-11 NOTE — PROGRESS NOTES
REPORT NAME: Progress Notes Report  VISIT DATE: 5/11/2017  VISIT TIME: 3:01 PM EDT  PATIENT NAME: Tomi Saeed  MEDICAL RECORD NUMBER: [id number]  YOB: 1927  AGE: 80  REFERRING PHYSICIAN: TAMEKA SINGH  SUPERVISING CLINICIAN: Grant Sal [place] CARE PROVIDER: [facility]  PATIENT HOME ADDRESS: [address] [place] [address], [address] [place] [address]  PATIENT HOME PHONE: [phone]  SOCIAL SECURITY NUMBER:   DIAGNOSIS 1: Unspecified atrial fibrillation / I48 91  DIAGNOSIS 2:   INR RANGE: 2 - 3  INR GOAL: 2 5  TREATMENT START DATE:   TREATMENT END DATE:   NEXT VISIT: 6/1/2017  [facility]  VISIT RESULTS  ENCOUNTER NUMBER:   TEST LOCATION: [facility]  TEST TYPE: Outside Lab (Venipuncture)  VISIT TYPE:   CURRENT INR: 2 46 PROTIME:   SPECIMEN COL AND RPT DATE: 5/11/2017 3:01 PM  EDT  VITAL SIGNS  PULSE:  BP: / WEIGHT:  HEIGHT:  TEMP:   CURRENT ANTICOAGULANT DOSING SCHEDULE  DOSE SIZE: 5mg    ANTICOAGULANT TYPE: WARFARIN  DOSING REGIMEN  Sun       Mon       Tues      Wed       Thurs     Fri       Sat  Total/Wk  7 5       5         7 5       5         7 5       5         5         42 5  PATIENT MEDICATION INSTRUCTION: Yes  PATIENT NUTRITIONAL COUNSELING: No  PATIENT BRUISING INSTRUCTION: No  LAST EDUCATION DATE:   PREVIOUS VISIT INFORMATION  VISITDATE  INRGoal INR   Sun    Mon    Tues   Wed    Thurs  Fri    Sat  Total/wk  5/11/2017   2 5     2 46  7 5    5      7 5    5      7 5    5      5  42 5  4/20/2017   2 5     3 19  7 5    5      7 5    5      7 5    5      5  42 5  4/4/2017    2 5     2 6   7 5    5      7 5    5      7 5    5      7 5  45  3/20/2017   2 5     2 86  7 5    5      7 5    5      7 5    5      7 5  45  ADDITIONAL PREVIOUS VISIT INFORMATION  VISITDATE   PRIMARY RX               DOSE      CrCl  5/11/2017   WARFARIN                 5mg  4/20/2017   WARFARIN                 5mg  4/4/2017    WARFARIN                 5mg  3/20/2017   WARFARIN                 5mg  OTHER CURRENT MEDICATIONS:  WARFARIN  PROGRESS NOTES: gave dosage to pt, retest inr in 3 wks  pt to remain on  same dose    PATIENT INSTRUCTIONS:   TEST LOCATION: [facility], , ,   INBOUND LAB DATA:  Lab       Lab Value Col Date                 Rpt Date                 Lab  Reference Range  Electronically signed byKrystal Aranda on 5/11/2017 3:01 PM EDT

## 2018-01-12 VITALS
WEIGHT: 175.44 LBS | HEIGHT: 70 IN | SYSTOLIC BLOOD PRESSURE: 138 MMHG | DIASTOLIC BLOOD PRESSURE: 62 MMHG | BODY MASS INDEX: 25.12 KG/M2 | HEART RATE: 72 BPM

## 2018-01-12 NOTE — PROGRESS NOTES
REPORT NAME: Patient Visit Summary Report   VISIT DATE: 1/30/2017  VISIT TIME: 2:34 PM EST  PATIENT NAME: Ron Iyer  MEDICAL RECORD NUMBER: [id number]  SOCIAL SECURITY NUMBER:   YOB: 1927  AGE: 80  REFERRING PHYSICIAN: TAMEKA SINGH  SUPERVISING CLINICIAN: [facility] CARE PROFESSIONAL: JAUN CALLAWAY  PATIENT HOME ADDRESS: [address] [place] [address], [address] [place] [address], [address] [place] [address]  PATIENT HOME PHONE: [phone]  DIAGNOSIS 1: Unspecified atrial fibrillation / I48 91  DIAGNOSIS 2:   DIAGNOSIS 3:   DIAGNOSIS 4:   INR RANGE: 2 - 3  INR GOAL: 2 5  TREATMENT START DATE:   TREATMENT END DATE:   NEXT VISIT: 2/13/2017  [facility]    VISIT RESULTS   ENCOUNTER NUMBER:   TEST LOCATION: [facility]  TEST TYPE: Outside Lab (Venipuncture)  VISIT TYPE:   CURRENT INR: 2 95 PROTIME:   SPECIMEN COL AND RPT DATE: 1/30/2017 2:34 PM  EST    VITAL SIGNS  PULSE:  B/P:  WEIGHT:  HEIGHT:  TEMP:     CURRENT ANTICOAGULANT DOSING SCHEDULE  DOSE SIZE: 5mg    ANTICOAGULANT TYPE: WARFARIN  DOSING REGIMEN  Sun       Mon       Tues      Wed       Thurs     Fri       Sat  Total/Wk  5         5         7 5       5         7 5       5         7 5       42 5    PATIENT MEDICATION INSTRUCTION: Yes  PATIENT NUTRITIONAL COUNSELING: No  PATIENT BRUISING INSTRUCTION: No      LAST EDUCATION DATE:       PREVIOUS VISIT INFORMATION  VISITDATE   INR Goal  INR   Sun     Mon     Tues    Wed     Thurs   Fri  Sat     Total/wk  1/30/2017   2 5       2 95  5       5       7 5     5       7 5     5  7 5     42 5  1/13/2017   2 5       1 95  7 5     5       7 5     5       7 5     5  7 5     45  12/30/2016  2 5       3 12  5       7 5     5       7 5     5       7 5  5       42 5  12/13/2016  2 5       2 81  7 5     5       7 5     7 5     7 5     5  7 5     47 5    ADDITIONAL PREVIOUS VISIT INFORMATION  VISITDATE   PRIMARY RX               DOSE      CrCl  1/30/2017   WARFARIN                 5mg                 1/13/2017 WARFARIN                 5mg                 12/30/2016  WARFARIN                 5mg                 12/13/2016  WARFARIN                 5mg                     OTHER CURRENT MEDICATIONS: WARFARIN      PROGRESS NOTES: gave dosage to pt  retest inr in 2 wks  ---- Additional Notes entered on 1/30/2017 2:45 PM EST by [facility] :  [facility] [place] will be taking over his warfarin  management  pt has a 5mg tablet  pt will be seeing Dr Jayda Faria now      PATIENT INSTRUCTIONS:     TEST LOCATION: [facility]    Electronically signed by: [facility] on 1/30/2017 at 2:45 PM EST

## 2018-01-12 NOTE — PROGRESS NOTES
REPORT NAME: Patient Visit Summary Report   VISIT DATE: 1/13/2017  VISIT TIME: 2:43 PM EST  PATIENT NAME: Xavier Lew  MEDICAL RECORD NUMBER: [id number]  SOCIAL SECURITY NUMBER:   YOB: 1927  AGE: 80  REFERRING PHYSICIAN: ERIKA REYNOLDS DO  SUPERVISING CLINICIAN: Kelechi Hahn  HEALTH CARE PROFESSIONAL: Moe Viera  PATIENT HOME ADDRESS: [address] [place] [address], [address] [place] [address], [address] [place] [address]  PATIENT HOME PHONE: [phone]  DIAGNOSIS 1: Unspecified atrial fibrillation / I48 91  DIAGNOSIS 2:   DIAGNOSIS 3:   DIAGNOSIS 4:   INR RANGE: 2 - 3  INR GOAL: 2 5  TREATMENT START DATE:   TREATMENT END DATE:   NEXT VISIT: 1/27/2017  [facility]    VISIT RESULTS   ENCOUNTER NUMBER:   TEST LOCATION: [facility]  TEST TYPE: Outside Lab (Venipuncture)  VISIT TYPE: Phone Consult  CURRENT INR: 1 95 PROTIME: 22 1  SPECIMEN COL AND RPT DATE: 1/13/2017 2:43  PM EST    VITAL SIGNS  PULSE:  B/P:  WEIGHT:  HEIGHT:  TEMP:     CURRENT ANTICOAGULANT DOSING SCHEDULE  DOSE SIZE: 5mg    ANTICOAGULANT TYPE: WARFARIN  DOSING REGIMEN  Sun       Mon       Tues      Wed       Thurs     Fri       Sat  Total/Wk  7 5       5         7 5       5         7 5       5         7 5       45    PATIENT MEDICATION INSTRUCTION: Yes  PATIENT NUTRITIONAL COUNSELING: No  PATIENT BRUISING INSTRUCTION: No      LAST EDUCATION DATE:       PREVIOUS VISIT INFORMATION  VISITDATE   INR Goal  INR   Sun     Mon Tues Wed Thurs   Fri  Sat     Total/wk  1/13/2017   2 5       1 95  7 5     5       7 5     5       7 5     5  7 5     45  12/30/2016  2 5       3 12  5       7 5     5       7 5     5       7 5  5       42 5  12/13/2016  2 5       2 81  7 5     5       7 5     7 5     7 5     5  7 5     47 5  11/30/2016  2 5       1 9   7 5     5       7 5     7 5     7 5     5  7 5     47 5    ADDITIONAL PREVIOUS VISIT INFORMATION  VISITDATE   PRIMARY RX               DOSE      CrCl  1/13/2017   WARFARIN                 5mg 12/30/2016  WARFARIN                 5mg                 12/13/2016  WARFARIN                 5mg                 11/30/2016  WARFARIN                 5mg                     OTHER CURRENT MEDICATIONS: WARFARIN      PROGRESS NOTES:     PATIENT INSTRUCTIONS: 1/13/17, tc pt, denies any further bleeding  prefers  to take 5 mg m/w/f, 7 5 mg others, rech 2 weeks, 1/27  manny  TEST LOCATION: [facility]    Electronically signed by:  Venita Conner on 1/13/2017 at 2:43 PM EST

## 2018-01-12 NOTE — PROGRESS NOTES
REPORT NAME: Patient Visit Summary Report   VISIT DATE: 12/13/2016  VISIT TIME: 3:22 PM EST  PATIENT NAME: Luke Foster  MEDICAL RECORD NUMBER: [id number]  SOCIAL SECURITY NUMBER:   YOB: 1927  AGE: 80  REFERRING PHYSICIAN: ERIKA REYNOLDS DO  SUPERVISING CLINICIAN: Suellen Guillory  HEALTH CARE PROFESSIONAL: Debrah Prader [address]  PATIENT HOME ADDRESS: [address] [place] [address], [address] [place] [address], [address] [place] [address]  PATIENT HOME PHONE: [phone]  DIAGNOSIS 1: Unspecified atrial fibrillation / I48 91  DIAGNOSIS 2:   DIAGNOSIS 3:   DIAGNOSIS 4:   INR RANGE: 2 - 3  INR GOAL: 2 5  TREATMENT START DATE:   TREATMENT END DATE:   NEXT VISIT: 12/28/2016  [facility]    VISIT RESULTS   ENCOUNTER NUMBER:   TEST LOCATION: [facility]  TEST TYPE: Outside Lab (Venipuncture)  VISIT TYPE: Phone Consult  CURRENT INR: 2 81 PROTIME: 29 1  SPECIMEN COL AND RPT DATE: 12/13/2016 3:22  PM EST    VITAL SIGNS  PULSE:  B/P:  WEIGHT:  HEIGHT:  TEMP:     CURRENT ANTICOAGULANT DOSING SCHEDULE  DOSE SIZE: 5mg    ANTICOAGULANT TYPE: WARFARIN  DOSING REGIMEN  Sun       Mon       Tues      Wed       Thurs     Fri       Sat  Total/Wk  7 5       5         7 5       7 5       7 5       5         7 5       47 5    PATIENT MEDICATION INSTRUCTION: Yes  PATIENT NUTRITIONAL COUNSELING: No  PATIENT BRUISING INSTRUCTION: No      LAST EDUCATION DATE:       PREVIOUS VISIT INFORMATION  VISITDATE   INR Goal  INR   Sun     Mon     Tues    Wed     Thurs   Fri  Sat     Total/wk  12/13/2016  2 5       2 81  7 5     5       7 5     7 5     7 5     5  7 5     47 5  11/30/2016  2 5       1 9   7 5     5       7 5     7 5     7 5     5  7 5     47 5  10/31/2016  2 5       2 07  7 5     5       7 5     5       7 5     5  7 5     45  9/30/2016   2 5       2 77  7 5     5       7 5     5       7 5     5  7 5     45    ADDITIONAL PREVIOUS VISIT INFORMATION  VISITDATE   PRIMARY RX               DOSE      CrCl  12/13/2016  WARFARIN                 5mg 11/30/2016  WARFARIN                 5mg                 10/31/2016  WARFARIN                 5mg                 9/30/2016   WARFARIN                 5mg                     OTHER CURRENT MEDICATIONS: WARFARIN      PROGRESS NOTES:     PATIENT INSTRUCTIONS: 12/13/16, tc pt, lm am, cont current dose, rech 2  weeks, 12/28  manny  TEST LOCATION: [facility]    Electronically signed by:  Jeremi Conner on 12/13/2016 at 3:22 PM EST

## 2018-01-12 NOTE — PROGRESS NOTES
REPORT NAME: Progress Notes Report  VISIT DATE: 6/22/2017  VISIT TIME: 2:11 PM EDT  PATIENT NAME: Veronica Restrepo  MEDICAL RECORD NUMBER: [id number]  YOB: 1927  AGE: 80  REFERRING PHYSICIAN: TAMEKA SINGH  SUPERVISING CLINICIAN: Grant Sal [place] CARE PROVIDER: [facility]  PATIENT HOME ADDRESS: [address] [place] [address], [address] [place] [address]  PATIENT HOME PHONE: [phone]  SOCIAL SECURITY NUMBER:   DIAGNOSIS 1: Unspecified atrial fibrillation / I48 91  DIAGNOSIS 2:   INR RANGE: 2 - 3  INR GOAL: 2 5  TREATMENT START DATE:   TREATMENT END DATE:   NEXT VISIT: 7/20/2017  [facility]  VISIT RESULTS  ENCOUNTER NUMBER:   TEST LOCATION: [facility]  TEST TYPE: Outside Lab (Venipuncture)  VISIT TYPE:   CURRENT INR: 2 74 PROTIME:   SPECIMEN COL AND RPT DATE: 6/22/2017 2:11 PM  EDT  VITAL SIGNS  PULSE:  BP: / WEIGHT:  HEIGHT:  TEMP:   CURRENT ANTICOAGULANT DOSING SCHEDULE  DOSE SIZE: 5mg    ANTICOAGULANT TYPE: WARFARIN  DOSING REGIMEN  Sun       Mon       Tues      Wed       Thurs     Fri       Sat  Total/Wk  7 5       5         7 5       5         7 5       5         5         42 5  PATIENT MEDICATION INSTRUCTION: No  PATIENT NUTRITIONAL COUNSELING: No  PATIENT BRUISING INSTRUCTION: No  LAST EDUCATION DATE:   PREVIOUS VISIT INFORMATION  VISITDATE  INRGoal INR   Sun    Mon    Tues   Wed    Thurs  Fri    Sat  Total/wk  6/22/2017   2 5     2 74  7 5    5      7 5    5      7 5    5      5  42 5  6/2/2017    2 5     2 6   7 5    5      7 5    5      7 5    5      5  42 5  5/11/2017   2 5     2 46  7 5    5      7 5    5      7 5    5      5  42 5  4/20/2017   2 5     3 19  7 5    5      7 5    5      7 5    5      5  42 5  ADDITIONAL PREVIOUS VISIT INFORMATION  VISITDATE   PRIMARY RX               DOSE      CrCl  6/22/2017   WARFARIN                 5mg  6/2/2017    WARFARIN                 5mg  5/11/2017   WARFARIN                 5mg  4/20/2017   WARFARIN                 5mg  OTHER CURRENT MEDICATIONS:  WARFARIN  PROGRESS NOTES: l/m on voicemail, retest inr in 4 wks    PATIENT INSTRUCTIONS:   TEST LOCATION: [facility], , ,   INBOUND LAB DATA:  Lab       Lab Value Col Date                 Rpt Date                 Lab  Reference Range  Electronically signed byApple Ramirez on 6/22/2017 2:11 PM EDT

## 2018-01-12 NOTE — PROGRESS NOTES
REPORT NAME: Patient Visit Summary Report   VISIT DATE: 9/30/2016  VISIT TIME: 2:26 PM EDT  PATIENT NAME: Kaur Lieberman  MEDICAL RECORD NUMBER: [id number]  SOCIAL SECURITY NUMBER:   YOB: 1927  AGE: 80  REFERRING PHYSICIAN: Sherri Bah DO  SUPERVISING CLINICIAN: ERIKA REYNOLDS DO  HEALTH CARE PROFESSIONAL: Gio Yanez [facility]  PATIENT HOME ADDRESS: [address] [place] [address], [address] [place] [address], [address] [place] [address]  PATIENT HOME PHONE: [phone]  DIAGNOSIS 1: Unspecified atrial fibrillation / I48 91  DIAGNOSIS 2:   DIAGNOSIS 3:   DIAGNOSIS 4:   INR RANGE: 2 - 3  INR GOAL: 2 5  TREATMENT START DATE:   TREATMENT END DATE:   NEXT VISIT: 10/28/2016  [facility]    VISIT RESULTS   ENCOUNTER NUMBER:   TEST LOCATION: [facility]  TEST TYPE: Outside Lab (Venipuncture)  VISIT TYPE: Phone Consult  CURRENT INR: 2 77 PROTIME: 28 8  SPECIMEN COL AND RPT DATE: 9/30/2016 2:26  PM EDT    VITAL SIGNS  PULSE:  B/P:  WEIGHT:  HEIGHT:  TEMP:     CURRENT ANTICOAGULANT DOSING SCHEDULE  DOSE SIZE: 5mg    ANTICOAGULANT TYPE: WARFARIN  DOSING REGIMEN  Sun       Mon       Tues      Wed       Thurs     Fri       Sat  Total/Wk  7 5       5         7 5       5         7 5       5         7 5       45    PATIENT MEDICATION INSTRUCTION: Yes  PATIENT NUTRITIONAL COUNSELING: No  PATIENT BRUISING INSTRUCTION: No      LAST EDUCATION DATE:       PREVIOUS VISIT INFORMATION  VISITDATE   INR Goal  INR   Sun     Mon     Tues    Wed     Thurs   Fri  Sat     Total/wk  9/30/2016   2 5       2 77  7 5     5       7 5     5       7 5     5  7 5     45    ADDITIONAL PREVIOUS VISIT INFORMATION  VISITDATE   PRIMARY RX               DOSE      CrCl  9/30/2016   WARFARIN                 5mg                     OTHER CURRENT MEDICATIONS: WARFARIN      PROGRESS NOTES:     PATIENT INSTRUCTIONS: 9/30/16, tc pt, cont current dose, rech 1 month  10/28  manny  TEST LOCATION: [facility]    Electronically signed by:  Gio Conner on 9/30/2016 at 2:26 PM EDT

## 2018-01-13 VITALS
BODY MASS INDEX: 25.09 KG/M2 | OXYGEN SATURATION: 98 % | SYSTOLIC BLOOD PRESSURE: 120 MMHG | HEART RATE: 73 BPM | DIASTOLIC BLOOD PRESSURE: 68 MMHG | WEIGHT: 175.25 LBS | HEIGHT: 70 IN

## 2018-01-13 VITALS
DIASTOLIC BLOOD PRESSURE: 60 MMHG | HEIGHT: 70 IN | OXYGEN SATURATION: 98 % | HEART RATE: 72 BPM | BODY MASS INDEX: 24.82 KG/M2 | WEIGHT: 173.38 LBS | SYSTOLIC BLOOD PRESSURE: 120 MMHG

## 2018-01-13 NOTE — RESULT NOTES
Verified Results  (1) PT WITH INR [id number] 08:25AM Yaneth Calvin Order Number: [id number]     Test Name Result Flag Reference   INR 1 84 H 0 86-1 16   Performing Comments: PT/ INR DONE WEEKLY OR AS DIRECTED BY PHYSICIAN  START 1/26/17 TO 1/26/18      Performing Comments: PT/ INR DONE WEEKLY OR AS DIRECTED BY PHYSICIAN   START 1/26/17 TO 1/26/18   PT 21 1 seconds H 12 0-14 3

## 2018-01-13 NOTE — PROGRESS NOTES
REPORT NAME: Progress Notes Report  VISIT DATE: 10/13/2017  VISIT TIME: 1:44 PM EDT  PATIENT NAME: Xavier Lew  MEDICAL RECORD NUMBER: [id number]  YOB: 1927  AGE: 80  REFERRING PHYSICIAN: TAMEKA SINGH  SUPERVISING CLINICIAN: [facility] CARE PROVIDER: [facility]  PATIENT HOME ADDRESS: [address] [place] [address]  PATIENT HOME PHONE: [phone]  SOCIAL SECURITY NUMBER:   DIAGNOSIS 1: Unspecified atrial fibrillation / I48 91  DIAGNOSIS 2:   INR RANGE: 2 - 3  INR GOAL: 2 5  TREATMENT START DATE:   TREATMENT END DATE:   NEXT VISIT: 11/10/2017  [facility]  VISIT RESULTS  ENCOUNTER NUMBER:   TEST LOCATION: [facility]  TEST TYPE: Outside Lab (Venipuncture)  VISIT TYPE:   CURRENT INR: 2 27 PROTIME:   SPECIMEN COL AND RPT DATE: 10/13/2017 1:44 PM  EDT  VITAL SIGNS  PULSE:  BP: / WEIGHT:  HEIGHT:  TEMP:   CURRENT ANTICOAGULANT DOSING SCHEDULE  DOSE SIZE: 5mg    ANTICOAGULANT TYPE: WARFARIN  DOSING REGIMEN  Sun       Mon       Tues      Wed       Thurs     Fri       Sat  Total/Wk  7 5       5         7 5       5         7 5       5         5         42 5  PATIENT MEDICATION INSTRUCTION: Yes  PATIENT NUTRITIONAL COUNSELING: No  PATIENT BRUISING INSTRUCTION: No  LAST EDUCATION DATE:   PREVIOUS VISIT INFORMATION  VISITDATE  INRGoal INR   Sun    Mon    Tues   Wed    Thurs  Fri    Sat  Total/wk  10/13/2017  2 5     2 27  7 5    5      7 5    5      7 5    5      5  42 5  9/8/2017    2 5     2 3   7 5    5      7 5    5      7 5    5      5  42 5  8/8/2017    2 5     2 5   7 5    5      7 5    5      7 5    5      5  42 5  7/24/2017   2 5     1 8   7 5    7 5    7 5    5      7 5    5      5  45  7 5    5      7 5    5      7 5    5      5  42 5  ADDITIONAL PREVIOUS VISIT INFORMATION  VISITDATE   PRIMARY RX               DOSE      CrCl  10/13/2017  WARFARIN                 5mg  9/8/2017    WARFARIN                 5mg  8/8/2017    WARFARIN 5mg  7/24/2017   WARFARIN                 5mg  OTHER CURRENT MEDICATIONS:  WARFARIN  PROGRESS NOTES: l/m for pt  retest inr in 4 wks    PATIENT INSTRUCTIONS:   TEST LOCATION: [facility], , ,   INBOUND LAB DATA:  Lab       Lab Value Col Date                 Rpt Date                 Lab  Reference Range  Electronically signed byKrystal Aranda on 10/13/2017 1:44 PM EDT

## 2018-01-13 NOTE — PROGRESS NOTES
REPORT NAME: Progress Notes Report  VISIT DATE: 4/4/2017  VISIT TIME: 2:51 PM EDT  PATIENT NAME: Jace Yan  MEDICAL RECORD NUMBER: [id number]  YOB: 1927  AGE: 80  REFERRING PHYSICIAN: TAMEKA SINGH  SUPERVISING CLINICIAN: [facility] CARE PROVIDER: L[facility] W[facility]  PATIENT HOME ADDRESS: [address] [place] [address], [address] [place] [address]  PATIENT HOME PHONE: [phone]  SOCIAL SECURITY NUMBER:   DIAGNOSIS 1: Unspecified atrial fibrillation / I48 91  DIAGNOSIS 2:   INR RANGE: 2 - 3  INR GOAL: 2 5  TREATMENT START DATE:   TREATMENT END DATE:   NEXT VISIT: 4/18/2017  [facility]  VISIT RESULTS  ENCOUNTER NUMBER:   TEST LOCATION: [facility]  TEST TYPE: Outside Lab (Venipuncture)  VISIT TYPE:   CURRENT INR: 2 6 PROTIME:   SPECIMEN COL AND RPT DATE: 4/4/2017 2:51 PM EDT  VITAL SIGNS  PULSE:  BP: / WEIGHT:  HEIGHT:  TEMP:   CURRENT ANTICOAGULANT DOSING SCHEDULE  DOSE SIZE: 5mg    ANTICOAGULANT TYPE: WARFARIN  DOSING REGIMEN  Sun       Mon       Tues      Wed       Thurs     Fri       Sat  Total/Wk  7 5       5         7 5       5         7 5       5         7 5       45  PATIENT MEDICATION INSTRUCTION: Yes  PATIENT NUTRITIONAL COUNSELING: No  PATIENT BRUISING INSTRUCTION: No  LAST EDUCATION DATE:   PREVIOUS VISIT INFORMATION  VISITDATE  INRGoal INR   Sun    Mon    Tues   Wed    Thurs  Fri    Sat  Total/wk  4/4/2017    2 5     2 6   7 5    5      7 5    5      7 5    5      7 5  45  3/20/2017   2 5     2 86  7 5    5      7 5    5      7 5    5      7 5  45  2/28/2017   2 5     1 87  7 5    5      7 5    5      7 5    5      7 5  45  2/13/2017   2 5     1 84  5      5      7 5    7 5    7 5    5      7 5  45  5      5      7 5    5      7 5    5      7 5  42 5  ADDITIONAL PREVIOUS VISIT INFORMATION  VISITDATE   PRIMARY RX               DOSE      CrCl  4/4/2017    WARFARIN                 5mg  3/20/2017   WARFARIN                 5mg  2/28/2017   WARFARIN 5mg  2/13/2017   WARFARIN                 5mg  OTHER CURRENT MEDICATIONS:  WARFARIN  PROGRESS NOTES: gave dosage to pt  retest inr in 2 wks  pt will be in  [place] april 23 to may 10    PATIENT INSTRUCTIONS:   TEST LOCATION: [facility], , ,   INBOUND LAB DATA:  Lab       Lab Value Col Date                 Rpt Date                 Lab  Reference Range  Electronically signed Laila Bella on 4/4/2017 2:51 PM EDT

## 2018-01-13 NOTE — RESULT NOTES
Verified Results  (1) PT WITH INR 20Mar2017 08:12AM Nadira Mancera Order Number: [id number]     Test Name Result Flag Reference   INR 2 86 H 0 86-1 16   Performing Comments: PT/ INR DONE WEEKLY OR AS DIRECTED BY PHYSICIAN  START 1/26/17 TO 1/26/18      Performing Comments: PT/ INR DONE WEEKLY OR AS DIRECTED BY PHYSICIAN   START 1/26/17 TO 1/26/18   PT 29 5 seconds H 12 0-14 3

## 2018-01-14 VITALS
DIASTOLIC BLOOD PRESSURE: 70 MMHG | HEIGHT: 70 IN | SYSTOLIC BLOOD PRESSURE: 138 MMHG | OXYGEN SATURATION: 98 % | WEIGHT: 173.25 LBS | BODY MASS INDEX: 24.8 KG/M2 | HEART RATE: 76 BPM

## 2018-01-14 VITALS
HEART RATE: 60 BPM | BODY MASS INDEX: 24.95 KG/M2 | WEIGHT: 174.25 LBS | HEIGHT: 70 IN | DIASTOLIC BLOOD PRESSURE: 70 MMHG | SYSTOLIC BLOOD PRESSURE: 142 MMHG

## 2018-01-14 NOTE — RESULT NOTES
Verified Results  (1) PT WITH INR [id number] 08:11AM Regina Aruna Order Number: [id number]     Test Name Result Flag Reference   INR 2 95 H 0 86-1 16   Performing Comments: PT/ INR DONE WEEKLY OR AS DIRECTED BY PHYSICIAN  START 1/26/17 TO 1/26/18      Performing Comments: PT/ INR DONE WEEKLY OR AS DIRECTED BY PHYSICIAN   START 1/26/17 TO 1/26/18   PT 30 2 seconds H 12 0-14 3

## 2018-01-15 NOTE — PROGRESS NOTES
REPORT NAME: Patient Visit Summary Report   VISIT DATE: 10/31/2016  VISIT TIME: 2:00 PM EDT  PATIENT NAME: Antony Quezada  MEDICAL RECORD NUMBER: [id number]  SOCIAL SECURITY NUMBER:   YOB: 1927  AGE: 80  REFERRING PHYSICIAN: ERIKA REYNOLDS DO  SUPERVISING CLINICIAN: [address] CARE PROFESSIONAL: Mary Viera  PATIENT HOME ADDRESS: [address] [place] [address], [address] [place] [address], [address] [place] [address]  PATIENT HOME PHONE: [phone]  DIAGNOSIS 1: Unspecified atrial fibrillation / I48 91  DIAGNOSIS 2:   DIAGNOSIS 3:   DIAGNOSIS 4:   INR RANGE: 2 - 3  INR GOAL: 2 5  TREATMENT START DATE:   TREATMENT END DATE:   NEXT VISIT: 11/29/2016  [facility]    VISIT RESULTS   ENCOUNTER NUMBER:   TEST LOCATION: [facility]  TEST TYPE: Outside Lab (Venipuncture)  VISIT TYPE: Phone Consult  CURRENT INR: 2 07 PROTIME: 23 1  SPECIMEN COL AND RPT DATE: 10/31/2016 2:00  PM EDT    VITAL SIGNS  PULSE:  B/P:  WEIGHT:  HEIGHT:  TEMP:     CURRENT ANTICOAGULANT DOSING SCHEDULE  DOSE SIZE: 5mg    ANTICOAGULANT TYPE: WARFARIN  DOSING REGIMEN  Sun       Mon       Tues      Wed       Thurs     Fri       Sat  Total/Wk  7 5       5         7 5       5         7 5       5         7 5       45    PATIENT MEDICATION INSTRUCTION: Yes  PATIENT NUTRITIONAL COUNSELING: No  PATIENT BRUISING INSTRUCTION: No      LAST EDUCATION DATE:       PREVIOUS VISIT INFORMATION  VISITDATE   INR Goal  INR   Sun     Mon     Tues    Wed     Thurs   Fri  Sat     Total/wk  10/31/2016  2 5       2 07  7 5     5       7 5     5       7 5     5  7 5     45  9/30/2016   2 5       2 77  7 5     5       7 5     5       7 5     5  7 5     45    ADDITIONAL PREVIOUS VISIT INFORMATION  VISITDATE   PRIMARY RX               DOSE      CrCl  10/31/2016  WARFARIN                 5mg                 9/30/2016   WARFARIN                 5mg                     OTHER CURRENT MEDICATIONS: WARFARIN      PROGRESS NOTES:     PATIENT INSTRUCTIONS: 10/31/16, TC PT, CONT CURRENT DOSE, Metsa 49 1 MONTH,  11/29  SANDRA  TEST LOCATION: [facility]    Electronically signed by:  Jose A Conner on 10/31/2016 at 2:00 PM EDT

## 2018-01-15 NOTE — PROGRESS NOTES
REPORT NAME: Patient Visit Summary Report   VISIT DATE: 11/30/2016  VISIT TIME: 4:18 PM EST  PATIENT NAME: Benedict Mcguire  MEDICAL RECORD NUMBER: [id number]  SOCIAL SECURITY NUMBER:   YOB: 1927  AGE: 80  REFERRING PHYSICIAN: ERIKA REYNOLDS DO  SUPERVISING CLINICIAN: Tray Mcclure  HEALTH CARE PROFESSIONAL: Sarita Viera  PATIENT HOME ADDRESS: [address] [place] [address], [address] [place] [address], [address] [place] [address]  PATIENT HOME PHONE: [phone]  DIAGNOSIS 1: Unspecified atrial fibrillation / I48 91  DIAGNOSIS 2:   DIAGNOSIS 3:   DIAGNOSIS 4:   INR RANGE: 2 - 3  INR GOAL: 2 5  TREATMENT START DATE:   TREATMENT END DATE:   NEXT VISIT: 12/14/2016  [facility]    VISIT RESULTS   ENCOUNTER NUMBER:   TEST LOCATION: [facility]  TEST TYPE: Outside Lab (Venipuncture)  VISIT TYPE: Phone Consult  CURRENT INR: 1 9 PROTIME: 21 6  SPECIMEN COL AND RPT DATE: 11/30/2016 4:18  PM EST    VITAL SIGNS  PULSE:  B/P:  WEIGHT:  HEIGHT:  TEMP:     CURRENT ANTICOAGULANT DOSING SCHEDULE  DOSE SIZE: 5mg    ANTICOAGULANT TYPE: WARFARIN  DOSING REGIMEN  Sun       Mon       Tues      Wed       Thurs     Fri       Sat  Total/Wk  7 5       5         7 5       7 5       7 5       5         7 5       47 5    PATIENT MEDICATION INSTRUCTION: Yes  PATIENT NUTRITIONAL COUNSELING: No  PATIENT BRUISING INSTRUCTION: No      LAST EDUCATION DATE:       PREVIOUS VISIT INFORMATION  VISITDATE   INR Goal  INR   Sun     Mon     Tues    Wed     Thurs   Fri  Sat     Total/wk  11/30/2016  2 5       1 9   7 5     5       7 5     7 5     7 5     5  7 5     47 5  10/31/2016  2 5       2 07  7 5     5       7 5     5       7 5     5  7 5     45  9/30/2016   2 5       2 77  7 5     5       7 5     5       7 5     5  7 5     45    ADDITIONAL PREVIOUS VISIT INFORMATION  VISITDATE   PRIMARY RX               DOSE      CrCl  11/30/2016  WARFARIN                 5mg                 10/31/2016  WARFARIN                 5mg                 9/30/2016   WARFARIN 5mg                     OTHER CURRENT MEDICATIONS: WARFARIN      PROGRESS NOTES:     PATIENT INSTRUCTIONS: 11/30/16, tc pt, lm am increase dose, 5 mg m/f, 7 5  mg others, rech 2 weeks, 12/14  manny  TEST LOCATION: [facility]    Electronically signed by:  Tana Conner on 11/30/2016 at 4:18 PM EST

## 2018-01-15 NOTE — RESULT NOTES
Verified Results  (1) PT WITH INR [id number] 07:50AM Aniya Saenz Order Number: [id number]     Test Name Result Flag Reference   INR 2 51 H 0 86-1 16   PT 27 4 seconds H 12 1-14 4

## 2018-01-15 NOTE — RESULT NOTES
Verified Results  (1) PT WITH INR [id number] 08:07AM Aysha Cargo Order Number: [id number]     Test Name Result Flag Reference   INR 3 19 H 0 86-1 16   Performing Comments: PT/ INR DONE WEEKLY OR AS DIRECTED BY PHYSICIAN   START 1/26/17 TO 1/26/18   PT 32 0 seconds H 12 0-14 3

## 2018-01-15 NOTE — RESULT NOTES
Verified Results  (1) PT WITH INR [id number] 08:15AM Janeen Sosa Order Number: [id number]     Test Name Result Flag Reference   INR 1 87 H 0 86-1 16   Performing Comments: PT/ INR DONE WEEKLY OR AS DIRECTED BY PHYSICIAN  START 1/26/17 TO 1/26/18      Performing Comments: PT/ INR DONE WEEKLY OR AS DIRECTED BY PHYSICIAN   START 1/26/17 TO 1/26/18   PT 21 4 seconds H 12 0-14 3

## 2018-01-15 NOTE — PROGRESS NOTES
REPORT NAME: Progress Notes Report  VISIT DATE: 7/24/2017  VISIT TIME: 12:50 PM EDT  PATIENT NAME: Jaclyn Ceballos  MEDICAL RECORD NUMBER: [id number]  YOB: 1927  AGE: 80  REFERRING PHYSICIAN: TAMEKA SINGH  SUPERVISING CLINICIAN: Grant Sal Boyce CARE PROVIDER: [address]   PATIENT HOME ADDRESS: [address] [place] [address], [address] [place] [address]  PATIENT HOME PHONE: [phone]  SOCIAL SECURITY NUMBER:   DIAGNOSIS 1: Unspecified atrial fibrillation / I48 91  DIAGNOSIS 2:   INR RANGE: 2 - 3  INR GOAL: 2 5  TREATMENT START DATE:   TREATMENT END DATE:   NEXT VISIT: 8/7/2017  [facility]  VISIT RESULTS  ENCOUNTER NUMBER:   TEST LOCATION: [facility]  TEST TYPE: Outside Lab (Venipuncture)  VISIT TYPE:   CURRENT INR: 1 8 PROTIME:   SPECIMEN COL AND RPT DATE: 7/24/2017 12:50 PM  EDT  VITAL SIGNS  PULSE:  BP: / WEIGHT:  HEIGHT:  TEMP:   CURRENT ANTICOAGULANT DOSING SCHEDULE  DOSE SIZE: 5mg    ANTICOAGULANT TYPE: WARFARIN  DOSING REGIMEN  Sun       Mon       Tues      Wed       Thurs     Fri       Sat  Total/Wk  7 5       7 5       7 5       5         7 5       5         5         45  7 5       5         7 5       5         7 5       5         5         42 5  PATIENT MEDICATION INSTRUCTION: Yes  PATIENT NUTRITIONAL COUNSELING: Yes  PATIENT BRUISING INSTRUCTION: Yes  LAST EDUCATION DATE:   PREVIOUS VISIT INFORMATION  VISITDATE  INRGoal INR   Sun    Mon    Tues   Wed    Thurs  Fri    Sat  Total/wk  7/24/2017   2 5     1 8   7 5    7 5    7 5    5      7 5    5      5  45  7 5    5      7 5    5      7 5    5      5  42 5  6/22/2017   2 5     2 74  7 5    5      7 5    5      7 5    5      5  42 5  6/2/2017    2 5     2 6   7 5    5      7 5    5      7 5    5      5  42 5  5/11/2017   2 5     2 46  7 5    5      7 5    5      7 5    5      5  42 5  ADDITIONAL PREVIOUS VISIT INFORMATION  VISITDATE   PRIMARY RX               DOSE      CrCl  7/24/2017   WARFARIN 5mg  6/22/2017   WARFARIN                 5mg  6/2/2017    WARFARIN                 5mg  5/11/2017   WARFARIN                 5mg  OTHER CURRENT MEDICATIONS:  WARFARIN  PROGRESS NOTES: l/m w changes / 2 wks  PATIENT INSTRUCTIONS:   TEST LOCATION: [facility], , ,   INBOUND LAB DATA:  Lab       Lab Value Col Date                 Rpt Date                 Lab  Reference Range  Electronically signed Teena Garcia  on 7/24/2017 12:50 PM EDT

## 2018-01-16 NOTE — PROGRESS NOTES
REPORT NAME: Progress Notes Report  VISIT DATE: 3/20/2017  VISIT TIME: 10:19 AM EDT  PATIENT NAME: Rosaline Connolly  MEDICAL RECORD NUMBER: [id number]  YOB: 1927  AGE: 80  REFERRING PHYSICIAN: TAMEKA SINGH  SUPERVISING CLINICIAN: Grant Sal [place] CARE PROVIDER: [facility]  PATIENT HOME ADDRESS: [id number] [facility], [address] [place] [address]  PATIENT HOME PHONE: [phone]  SOCIAL SECURITY NUMBER:   DIAGNOSIS 1: Unspecified atrial fibrillation / I48 91  DIAGNOSIS 2:   INR RANGE: 2 - 3  INR GOAL: 2 5  TREATMENT START DATE:   TREATMENT END DATE:   NEXT VISIT: 4/10/2017  [facility]  VISIT RESULTS  ENCOUNTER NUMBER:   TEST LOCATION: [facility]  TEST TYPE: Outside Lab (Venipuncture)  VISIT TYPE:   CURRENT INR: 2 86 PROTIME:   SPECIMEN COL AND RPT DATE: 3/20/2017 10:19 AM  EDT  VITAL SIGNS  PULSE:  BP: / WEIGHT:  HEIGHT:  TEMP:   CURRENT ANTICOAGULANT DOSING SCHEDULE  DOSE SIZE: 5mg    ANTICOAGULANT TYPE: WARFARIN  DOSING REGIMEN  Sun       Mon       Tues      Wed       Thurs     Fri       Sat  Total/Wk  7 5       5         7 5       5         7 5       5         7 5       45  PATIENT MEDICATION INSTRUCTION: Yes  PATIENT NUTRITIONAL COUNSELING: No  PATIENT BRUISING INSTRUCTION: No  LAST EDUCATION DATE:   PREVIOUS VISIT INFORMATION  VISITDATE  INRGoal INR   Sun    Mon    Tues   Wed    Thurs  Fri    Sat  Total/wk  3/20/2017   2 5     2 86  7 5    5      7 5    5      7 5    5      7 5  45  2/28/2017   2 5     1 87  7 5    5      7 5    5      7 5    5      7 5  45  2/13/2017   2 5     1 84  5      5      7 5    7 5    7 5    5      7 5  45  5      5      7 5    5      7 5    5      7 5  42 5  1/30/2017   2 5     2 95  5      5      7 5    5      7 5    5      7 5  42 5  ADDITIONAL PREVIOUS VISIT INFORMATION  VISITDATE   PRIMARY RX               DOSE      CrCl  3/20/2017   WARFARIN                 5mg  2/28/2017   WARFARIN                 5mg  2/13/2017   WARFARIN 5mg  1/30/2017   WARFARIN                 5mg  OTHER CURRENT MEDICATIONS:  WARFARIN  PROGRESS NOTES: gave dosage to pt, retest inr in 2 to 3 wks  PATIENT INSTRUCTIONS:   TEST LOCATION: [facility], , ,   INBOUND LAB DATA:  Lab       Lab Value Col Date                 Rpt Date                 Lab  Reference Range  Electronically signed byBiju Medeiros on 3/21/2017 10:19 AM EDT

## 2018-01-16 NOTE — RESULT NOTES
Verified Results  (1) PT WITH INR [id number] 08:24AM Ramireza Cargo Order Number: [id number]     Test Name Result Flag Reference   INR 2 46 H 0 86-1 16   Performing Comments: PT/ INR DONE WEEKLY OR AS DIRECTED BY PHYSICIAN   START 1/26/17 TO 1/26/18   PT 27 0 seconds H 12 1-14 4

## 2018-01-16 NOTE — PROGRESS NOTES
REPORT NAME: Progress Notes Report  VISIT DATE: 4/20/2017  VISIT TIME: 2:06 PM EDT  PATIENT NAME: Xavier Lew  MEDICAL RECORD NUMBER: [id number]  YOB: 1927  AGE: 80  REFERRING PHYSICIAN: TAMEKA SINGH  SUPERVISING CLINICIAN: Grant [facility] CARE PROVIDER: Merritt Fontanez  PATIENT HOME ADDRESS: [address] [place] [address]  PATIENT HOME PHONE: [phone]  SOCIAL SECURITY NUMBER:   DIAGNOSIS 1: Unspecified atrial fibrillation / I48 91  DIAGNOSIS 2:   INR RANGE: 2 - 3  INR GOAL: 2 5  TREATMENT START DATE:   TREATMENT END DATE:   NEXT VISIT: 5/4/2017  [facility]  VISIT RESULTS  ENCOUNTER NUMBER:   TEST LOCATION: [facility]  TEST TYPE: Outside Lab (Venipuncture)  VISIT TYPE:   CURRENT INR: 3 19 PROTIME:   SPECIMEN COL AND RPT DATE: 4/20/2017 2:06 PM  EDT  VITAL SIGNS  PULSE:  BP: / WEIGHT:  HEIGHT:  TEMP:   CURRENT ANTICOAGULANT DOSING SCHEDULE  DOSE SIZE: 5mg    ANTICOAGULANT TYPE: WARFARIN  DOSING REGIMEN  Sun       Mon       Tues      Wed       Thurs     Fri       Sat  Total/Wk  7 5       5         7 5       5         7 5       5         5         42 5  PATIENT MEDICATION INSTRUCTION: No  PATIENT NUTRITIONAL COUNSELING: No  PATIENT BRUISING INSTRUCTION: No  LAST EDUCATION DATE:   PREVIOUS VISIT INFORMATION  VISITDATE  INRGoal INR   Sun    Mon    Tues   Wed    Thurs  Fri    Sat  Total/wk  4/20/2017   2 5     3 19  7 5    5      7 5    5      7 5    5      5  42 5  4/4/2017    2 5     2 6   7 5    5      7 5    5      7 5    5      7 5  45  3/20/2017   2 5     2 86  7 5    5      7 5    5      7 5    5      7 5  45  2/28/2017   2 5     1 87  7 5    5      7 5    5      7 5    5      7 5  45  ADDITIONAL PREVIOUS VISIT INFORMATION  VISITDATE   PRIMARY RX               DOSE      CrCl  4/20/2017   WARFARIN                 5mg  4/4/2017    WARFARIN                 5mg  3/20/2017   WARFARIN                 5mg  2/28/2017   WARFARIN                 5mg  OTHER CURRENT MEDICATIONS:  WARFARIN  PROGRESS NOTES: l/m for pt   retest inr in 2 wks  PATIENT INSTRUCTIONS:   TEST LOCATION: [facility], , ,   INBOUND LAB DATA:  Lab       Lab Value Col Date                 Rpt Date                 Lab  Reference Range  Electronically signed Jerome Hassan on 4/20/2017 2:06 PM EDT

## 2018-01-16 NOTE — RESULT NOTES
Verified Results  (1) PT WITH INR 01Jun2017 08:17AM Jaz Rodriguez Order Number: [id number]     Test Name Result Flag Reference   INR 2 64 H 0 86-1 16   Performing Comments: PT/ INR DONE WEEKLY OR AS DIRECTED BY PHYSICIAN   START 1/26/17 TO 1/26/18   PT 28 5 seconds H 12 1-14 4

## 2018-01-17 NOTE — PROGRESS NOTES
REPORT NAME: Patient Visit Summary Report   VISIT DATE: 2/13/2017  VISIT TIME: 9:42 AM EST  PATIENT NAME: Mari Reeder  MEDICAL RECORD NUMBER: [id number]  SOCIAL SECURITY NUMBER:   YOB: 1927  AGE: 80  REFERRING PHYSICIAN: TAMEKA SINGH  SUPERVISING CLINICIAN: Grant [facility] CARE PROFESSIONAL: JAUN CALLAWAY  PATIENT HOME ADDRESS: [address] [place] [address]  PATIENT HOME PHONE: [phone]  DIAGNOSIS 1: Unspecified atrial fibrillation / I48 91  DIAGNOSIS 2:   DIAGNOSIS 3:   DIAGNOSIS 4:   INR RANGE: 2 - 3  INR GOAL: 2 5  TREATMENT START DATE:   TREATMENT END DATE:   NEXT VISIT: 2/27/2017  [facility]    VISIT RESULTS   ENCOUNTER NUMBER:   TEST LOCATION: [facility]  TEST TYPE: Outside Lab (Venipuncture)  VISIT TYPE:   CURRENT INR: 1 84 PROTIME:   SPECIMEN COL AND RPT DATE: 2/13/2017 9:42 AM  EST    VITAL SIGNS  PULSE:  B/P:  WEIGHT:  HEIGHT:  TEMP:     CURRENT ANTICOAGULANT DOSING SCHEDULE  DOSE SIZE: 5mg    ANTICOAGULANT TYPE: WARFARIN  DOSING REGIMEN  Sun       Mon       Tues      Wed       Thurs     Fri       Sat  Total/Wk  5         5         7 5       7 5       7 5       5         7 5       45  5         5         7 5       5         7 5       5         7 5       42 5    PATIENT MEDICATION INSTRUCTION: Yes  PATIENT NUTRITIONAL COUNSELING: No  PATIENT BRUISING INSTRUCTION: No      LAST EDUCATION DATE:       PREVIOUS VISIT INFORMATION  VISITDATE   INR Goal  INR   Sun     Mon Tues Wed Thurs Fri  Sat     Total/wk  2/13/2017   2 5       1 84  5       5       7 5     7 5     7 5     5  7 5     45  5       5       7 5     5       7 5     5  7 5     42 5  1/30/2017   2 5       2 95  5       5       7 5     5       7 5     5  7 5     42 5  1/13/2017   2 5       1 95  7 5     5       7 5     5       7 5     5  7 5     45  12/30/2016  2 5       3 12  5       7 5     5       7 5     5       7 5  5       42 5    ADDITIONAL PREVIOUS VISIT INFORMATION  VISITDATE   PRIMARY RX               DOSE      CrCl  2/13/2017   WARFARIN                 5mg                 1/30/2017   WARFARIN                 5mg                 1/13/2017   WARFARIN                 5mg                 12/30/2016  WARFARIN                 5mg                     OTHER CURRENT MEDICATIONS: WARFARIN      PROGRESS NOTES: gave dosage to pt  pt missed a dose last Wednesday due to  rectal bleeding  pt consulted physician, who stated the bleed was from  radiation for prostate procedure  retest in 2 wks      PATIENT INSTRUCTIONS:     TEST LOCATION: [facility]    Electronically signed by: Diomedes Schuster on 2/14/2017 at 9:42 AM EST

## 2018-01-17 NOTE — PROGRESS NOTES
REPORT NAME: Patient Visit Summary Report   VISIT DATE: 2/28/2017  VISIT TIME: 8:49 AM EST  PATIENT NAME: Meghan Joyner  MEDICAL RECORD NUMBER: [id number]  SOCIAL SECURITY NUMBER:   YOB: 1927  AGE: 80  REFERRING PHYSICIAN: [address]  SUPERVISING CLINICIAN: [address] [facility] CARE PROFESSIONAL: JAUN CALLAWAY  PATIENT HOME ADDRESS: [address] [place] [address]  PATIENT HOME PHONE: [phone]  DIAGNOSIS 1: Unspecified atrial fibrillation / I48 91  DIAGNOSIS 2:   DIAGNOSIS 3:   DIAGNOSIS 4:   INR RANGE: 2 - 3  INR GOAL: 2 5  TREATMENT START DATE:   TREATMENT END DATE:   NEXT VISIT: 3/13/2017  [facility]    VISIT RESULTS   ENCOUNTER NUMBER:   TEST LOCATION: [facility]  TEST TYPE: Outside Lab (Venipuncture)  VISIT TYPE:   CURRENT INR: 1 87 PROTIME:   SPECIMEN COL AND RPT DATE: 2/28/2017 8:49 AM  EST    VITAL SIGNS  PULSE:  B/P:  WEIGHT:  HEIGHT:  TEMP:     CURRENT ANTICOAGULANT DOSING SCHEDULE  DOSE SIZE: 5mg    ANTICOAGULANT TYPE: WARFARIN  DOSING REGIMEN  Sun       Mon       Tues      Wed       Thurs     Fri       Sat  Total/Wk  7 5       5         7 5       5         7 5       5         7 5       45    PATIENT MEDICATION INSTRUCTION: No  PATIENT NUTRITIONAL COUNSELING: No  PATIENT BRUISING INSTRUCTION: No      LAST EDUCATION DATE:       PREVIOUS VISIT INFORMATION  VISITDATE   INR Goal  INR   Sun     Mon     Tues    Wed     Thurs   Fri  Sat     Total/wk  2/28/2017   2 5       1 87  7 5     5       7 5     5       7 5     5  7 5     45  2/13/2017   2 5       1 84  5       5       7 5     7 5     7 5     5  7 5     45  5       5       7 5     5       7 5     5  7 5     42 5  1/30/2017   2 5       2 95  5       5       7 5     5       7 5     5  7 5     42 5  1/13/2017   2 5       1 95  7 5     5       7 5     5       7 5     5  7 5     45    ADDITIONAL PREVIOUS VISIT INFORMATION  VISITDATE   PRIMARY RX               DOSE      CrCl  2/28/2017   WARFARIN 5mg                 2/13/2017   WARFARIN                 5mg                 1/30/2017   WARFARIN                 5mg                 1/13/2017   WARFARIN                 5mg                     OTHER CURRENT MEDICATIONS: WARFARIN      PROGRESS NOTES: l/m for pt  retest inr in 2 wks      PATIENT INSTRUCTIONS:     TEST LOCATION: [facility]    Electronically signed by: Melody Turcios on 2/28/2017 at 8:49 AM EST

## 2018-01-17 NOTE — PROGRESS NOTES
Assessment    1  Encounter for preventive health examination (V70 0) (Z00 00)    Plan  Diabetes mellitus type II, controlled    · (1) BASIC METABOLIC PROFILE; Status:Active; Requested for:08Ify2830;    · (1) HEMOGLOBIN A1C; Status:Active; Requested for:25Jmx9017;    · (1) LDL,DIRECT; Status:Active; Requested for:99Lke1439;    · (1) MICROALBUMIN CREATININE RATIO, RANDOM URINE; Status:Active; Requested  for:77Hid1725;   Screening for genitourinary condition    · *VB - Urinary Incontinence Screen (Dx Z13 89 Screen for UI); Status:Resulted - Requires  Verification,Retrospective By Protocol Authorization;   Done: 30WBN8249 12:00AM    Discussion/Summary    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 and was given Adacel today  He'll return at the end of December with lab work just prior to this  He was given a lab slip  Medical conditions are very well-managed and he was encouraged to continue his optimal lifestyle and call for any problems  Chief Complaint  pt is here for annual exam      History of Present Illness  HPI: 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 and review of medical problems  He is truly remarkable at age 80 and appears much tenderness stated age  2 days ago he played 18 holes of golf and walk the entire course without difficulty  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   He travels frequently including visiting friends and family and for professional reasons  He has had no recent illnesses  We reviewed his management of diabetes, hyperlipidemia, hypertension, paroxysmal atrial fibrillation and rheumatoid arthritis  We also reviewed BPH  He continues to have BPH related symptoms are managed them quite well  He has not had a flare rheumatoid arthritis in quite some time and sees rheumatology yearly  Methotrexate dose is unchanged        Recent labs show well-controlled illness including normal CBC, normal CMP except for glucose of 112 which is quite good given his underlying diabetes with him on A1c of 6 8, and slightly low albumin of 3 3 and LDL was 56  Hearing well compensated with hearing aids, vision excellent result exam vision care  He is up-to-date on dermatology visits without active skin problems  Digestion is excellent and he moves his bowels likely  Strength and balance are superior, there is no cognitive deficit in screening for depression is negative  He has excellent social support  Mild venous insufficiency continues respond low-sodium diet, with no appreciable increased edema  There is no incontinence, and there have been no episodes of falling  There is no chest pain or short of breath, no palpitations or lightheadedness with underlying paroxysmal atrial fibrillation and left bundle branch block  Welcome to [employer] and Wellness Visits: The patient is being seen for the subsequent annual wellness visit  Medicare Screening and Risk Factors   Medicare Screening Tests Risk Questions   Abdominal aortic aneurysm risk assessment: over 72years of age  Osteoporosis risk assessment: none indicated  HIV risk assessment: none indicated  Drug and Alcohol Use: The patient is a former cigarette smoker  The patient reports drinking 1-2 glass drinks per week  Alcohol concern:   The patient has no concerns about alcohol abuse  He has never used illicit drugs  Diet and Physical Activity: Current diet includes well balanced meals and 3 servings of dairy products per day  He exercises daily  Exercise: walking, stretching, strength training 30 minutes per day  Mood Disorder and Cognitive Impairment Screening: He denies feeling down, depressed, or hopeless over the past two weeks  He denies feeling little interest or pleasure in doing things over the past two weeks     Cognitive impairment screening: denies difficulty learning/retaining new information, denies difficulty handling complex tasks, denies difficulty with reasoning, denies difficulty with spatial ability and orientation, denies difficulty with language and denies difficulty with behavior  Functional Ability/Level of Safety: Hearing is significantly decreased in the right ear and significantly decreased in the left ear  He reports hearing difficulties  He uses a hearing aid  The patient is currently able to do activities of daily living without limitations, able to do instrumental activities of daily living without limitations, able to participate in social activities without limitations and able to drive without limitations  Activities of daily living details: does not need help using the phone, no transportation help needed, does not need help shopping, no meal preparation help needed, does not need help doing housework, does not need help doing laundry, does not need help managing medications and does not need help managing money  Fall risk factors: The patient fell 0 times in the past 12 months  Home safety risk factors:  no unfamiliar surroundings, no loose rugs, no poor household lighting, no uneven floors, no household clutter, grab bars in the bathroom and handrails on the stairs  Advance Directives: Advance directives: durable power of  for health care directives, but no living will and no advance directives  Co-Managers and Medical Equipment/Suppliers: See Patient Care Team   Preventive Quality Program 65 and Older: Falls Risk: The patient fell 0 times in the past 12 months  The patient is currently asymptomatic Symptoms Include:  Urinary Incontinence Symptoms includes: urinary incontinence, urinary urgency, nocturia, intermittent stream and post-void dribbling        Patient Care Team    Care Team Member Role Specialty Office Number   Dao Gisella  Cardiology [phone], Shreya Vasquez [facility]  Cardiology [phone]     Review of Systems    Constitutional: negative  Head and Face: negative  Eyes: negative     ENT: hearing loss  Cardiovascular: negative  Respiratory: negative  Gastrointestinal: negative  Genitourinary: urinary frequency, urinary urgency, urinary hesitancy and nocturia, but no urinary incontinence and no hematuria  Musculoskeletal: joint stiffness, but no diffuse joint pain, no generalized muscle aches, no back pain, no joint swelling, no back muscle spasm, no pain in other joints and no limping  Integumentary and Breasts: negative  Neurological: negative  Psychiatric: negative  Endocrine: negative  Hematologic and Lymphatic: negative  Active Problems    1  Arthritis (716 90) (M19 90)   2  Benign prostatic hypertrophy (600 00) (N40 0)   3  Chronic constipation (564 00) (K59 09)   4  Diabetes mellitus type II, controlled (250 00) (E11 9)   5  Diverticulosis (562 10) (K57 90)   6  Dyslipidemia (272 4) (E78 5)   7  Edema (782 3) (R60 9)   8  Hypertension (401 9) (I10)   9  Left bundle-branch block (426 3) (I44 7)   10  Paroxysmal atrial fibrillation (427 31) (I48 0)   11  Rheumatoid arthritis (714 0) (M06 9)   12  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)   13  Venous insufficiency (459 81) (I87 2)    Past Medical History    · History of basal cell carcinoma (V10 83) (P77 917)   · History of malignant neoplasm of prostate (V10 46) (Z85 46)    The active problems and past medical history were reviewed and updated today  Surgical History    · History of Cataract Surgery   · History of Cath Stent Placement   · History of Hemorrhoidectomy   · History of Hernia Repair   · History of Prostate Surgery    The surgical history was reviewed and updated today  Family History  Mother    · Family history of Diabetes Mellitus (V18 0)   · Family history of Lung Cancer (V16 1)  Father    · Family history of Heart Disease (V17 49)   · Family history of Stroke Syndrome (V17 1)    The family history was reviewed and updated today         Social History · Being A Social Drinker   · Denied: History of Drug Use   · Denied: History of Never A Smoker   · Never A Smoker  The social history was reviewed and updated today  Current Meds   1  AmLODIPine Besylate 5 MG Oral Tablet; Take 1 tablet by mouth  daily; Therapy: 37VOD9084 to (Evaluate:27Sep2017)  Requested for: 63SSM6550; Last   Rx:29Jun2017 Ordered   2  Artificial Tears 0 1-0 3 % Ophthalmic Solution; APPLY 1 DROP Twice daily; Therapy: 20WPA9201 to Recorded   3  Aspirin 81 MG TABS; TAKE 1 TABLET DAILY; Therapy: (Alben Service) to Recorded   4  Atorvastatin Calcium 10 MG Oral Tablet; TAKE 1 TABLET BY MOUTH AT  BEDTIME; Therapy: 80PBH7595 to (Evaluate:27Sep2017)  Requested for: 53MDA0938; Last   Rx:29Jun2017 Ordered   5  DilTIAZem HCl - 120 MG Oral Tablet; Take 1 tablet by mouth  daily; Therapy: 24HTA6567 to (Evaluate:27Sep2017)  Requested for: 61AXI6407; Last   Rx:29Jun2017 Ordered   6  Dulcolax 5 MG Oral Tablet Delayed Release; TAKE 1 TABLET Bedtime PRN   CONSTIPATION; Therapy: 20Eto2476 to (Evaluate:08Oct2017); Last Rx:97Ezr9560 Ordered   7  Fish Oil Concentrate 1000 MG Oral Capsule; TAKE 4 CAPSULE Daily; Therapy: 21Jan2013 to (Evaluate:23Nov2014) Recorded   8  Folic Acid 1 MG Oral Tablet; Take 2mg daily; Therapy: 21Jan2013 to Recorded   9  GNP Calcium 1200 8086-5418 MG-UNIT Oral Tablet Chewable; CHEW 1 TABLET   DAILY; Therapy: 09UXM0024 to Recorded   10  Lisinopril 10 MG Oral Tablet; Take 1 tablet by mouth  daily; Therapy: 52Xck4800 to (Evaluate:27Sep2017)  Requested for: 61PKV8300; Last    Rx:29Jun2017 Ordered   11  Methotrexate 2 5 MG Oral Tablet; TAKE 8mg once a week; Therapy: 94Zap0492 to  Requested for: 13CWJ3622 Recorded   12  Multiple Vitamin TABS; TAKE 1 TABLET DAILY; Therapy: 21Jan2013 to (Evaluate:23Nov2014) Recorded   13  Vitamin D3 2000 UNIT Oral Capsule; take 1 capsule daily; Therapy: 99EVD0741 to (Last Rx:09Oct2015) Ordered   14   Warfarin Sodium 5 MG Oral Tablet; take 1 tablet by mouth  daily as directed; Therapy: 21Jan2013 to (Evaluate:18Oct2017)  Requested for: 09Tyg0498; Last    Rx:55Kjv5165 Ordered   15  Warfarin Sodium 7 5 MG Oral Tablet; Take 1 tablet by mouth  daily; Therapy: 18ZRD6047 to (Evaluate:18Oct2017)  Requested for: 51Sau3097; Last    Rx:45Sip2127 Ordered   16  Xalatan 0 005 % Ophthalmic Solution; INSTILL 1 DROP  INTO AFFECTED EYE(S)    ONCE DAILY AS DIRECTED; Therapy: 98FSG5489 to Recorded    The medication list was reviewed and updated today  Allergies    1  No Known Drug Allergies    Immunizations   1 2 3 4    Influenza  01-Oct-2012 24-Oct-2014 09-Oct-2015 01-Nov-2016    PCV  09-Oct-2015        Vitals  Signs    Heart Rate: 73  Systolic: 808  Diastolic: 68  Height: 5 ft 10 in  Weight: 175 lb 4 00 oz  BMI Calculated: 25 15  BSA Calculated: 1 98  O2 Saturation: 98    Physical Exam    Constitutional Appears well good spirits and much her stated age  Balance and strength are excellent, no cognitive difficulties noted, no neurologic abnormalities  Skin without rash or lesions  HEENT exam unremarkable  Head and neck without mass or adenopathy, no thyromegaly  Carotid pulses normal  Lungs clear and cardiac exam is normal on auscultation  Abdomen benign including normal bowel sounds, nontender without mass or bruit or organomegaly  There is trace edema lower legs with some skin integrity  Peripheral circulation is intact  There is no significant rheumatoid deformity  There is no synovitis  Gait is stable normal       Results/Data  PHQ-2 Adult Depression Screening 80Wjh5367 09:29AM User, Ahs     Test Name Result Flag Reference   PHQ-2 Adult Depression Score 0     Over the last two weeks, how often have you been bothered by any of the following problems?   Little interest or pleasure in doing things: Not at all - 0  Feeling down, depressed, or hopeless: Not at all - 0   PHQ-2 Adult Depression Screening Negative       Falls Risk Assessment (Dx Z13 89 Screen for Neurologic Disorder) 93Xjm7175 09:29AM User, Ahs     Test Name Result Flag Reference   Falls Risk      No falls in the past year     *VB - Urinary Incontinence Screen (Dx Z13 89 Screen for UI) 51Trs6083 12:00AM Erika Espinosa     Test Name Result Flag Reference   Urinary Incontinence Assessment 65Keh0387                     Future Appointments    Date/Time Provider Specialty Site   10/12/2017 03:20 PM Louise Piña DO Cardiology [address] [place] [address]     Signatures   Electronically signed by : SHIREEN Tracey ; Aug 28 2017  9:41AM EST                       (Author)

## 2018-01-18 NOTE — RESULT NOTES
Verified Results  (1) PT WITH INR 04Apr2017 08:18AM Singh Ming Order Number: [id number]     Test Name Result Flag Reference   INR 2 60 H 0 86-1 16   Performing Comments: PT/ INR DONE WEEKLY OR AS DIRECTED BY PHYSICIAN  START 1/26/17 TO 1/26/18      Performing Comments: PT/ INR DONE WEEKLY OR AS DIRECTED BY PHYSICIAN   START 1/26/17 TO 1/26/18   PT 27 4 seconds H 12 0-14 3

## 2018-01-18 NOTE — RESULT NOTES
Verified Results  (1) PT WITH INR 15Jlu5066 08:43AM Urszula Washington Order Number: [id number]   Order Number: [id number]     Test Name Result Flag Reference   INR 2 77 H 0 86-1 16   Performing Comments: FAX RESULTS [phone]  Script good for 6 months    Performing Comments: FAX RESULTS -[phone]  Script good for 6 months      Performing Comments: FAX RESULTS -[phone] good for 6 monthsPerforming Comments: FAX RESULTS -[phone] good for 6 months   PT 28 8 seconds H 12 0-14 3

## 2018-01-20 PROBLEM — I48.91 ATRIAL FIBRILLATION (HCC): Status: ACTIVE | Noted: 2018-01-20

## 2018-01-22 VITALS
WEIGHT: 174.38 LBS | DIASTOLIC BLOOD PRESSURE: 50 MMHG | OXYGEN SATURATION: 96 % | BODY MASS INDEX: 24.97 KG/M2 | HEIGHT: 70 IN | HEART RATE: 67 BPM | SYSTOLIC BLOOD PRESSURE: 132 MMHG

## 2018-01-23 NOTE — RESULT NOTES
Verified Results  (1) PT WITH INR 87XIY6016 08:29AM Jocelin Mess Order Number: [id number]     Test Name Result Flag Reference   INR 1 80 H 0 86-1 16   PT 21 0 seconds H 12 1-14 4

## 2018-01-23 NOTE — PROGRESS NOTES
REPORT NAME: Progress Notes Report  VISIT DATE: 12/18/2017  VISIT TIME: 2:05 PM EST  PATIENT NAME: Catrina Castillo  MEDICAL RECORD NUMBER: [id number]  YOB: 1927  AGE: 80  REFERRING PHYSICIAN: TAMEKA SINGH  SUPERVISING CLINICIAN: [facility] CARE PROVIDER: Jennifer Carlton  PATIENT HOME ADDRESS: [address] [place] [address]  PATIENT HOME PHONE: [phone]  SOCIAL SECURITY NUMBER:   DIAGNOSIS 1: Unspecified atrial fibrillation / I48 91  DIAGNOSIS 2:   INR RANGE: 2 - 3  INR GOAL: 2 5  TREATMENT START DATE:   TREATMENT END DATE:   NEXT VISIT: 1/8/2018  Shaun Avina Cardiology  VISIT RESULTS  ENCOUNTER NUMBER:   TEST LOCATION: [facility]  TEST TYPE: Outside Lab (Venipuncture)  VISIT TYPE:   CURRENT INR: 2 5 PROTIME:   SPECIMEN COL AND RPT DATE: 12/18/2017 2:05 PM  EST  VITAL SIGNS  PULSE:  BP: / WEIGHT:  HEIGHT:  TEMP:   CURRENT ANTICOAGULANT DOSING SCHEDULE  DOSE SIZE: 5mg    ANTICOAGULANT TYPE: WARFARIN  DOSING REGIMEN  Sun       Mon       Tues      Wed       Thurs     Fri       Sat  Total/Wk  7 5       5         7 5       5         7 5       5         7 5       45  PATIENT MEDICATION INSTRUCTION: No  PATIENT NUTRITIONAL COUNSELING: No  PATIENT BRUISING INSTRUCTION: No  LAST EDUCATION DATE:   PREVIOUS VISIT INFORMATION  VISITDATE  INRGoal INR   Sun    Mon    Tues   Wed    Thurs  Fri    Sat  Total/wk  12/18/2017  2 5     2 5   7 5    5      7 5    5      7 5    5      7 5  45  12/4/2017   2 5     1 8   7 5    5      7 5    5      7 5    5      7 5  45  11/13/2017  2 5     2 04  7 5    5      7 5    5      7 5    5      5  42 5  10/13/2017  2 5     2 27  7 5    5      7 5    5      7 5    5      5  42 5  ADDITIONAL PREVIOUS VISIT INFORMATION  VISITDATE   PRIMARY RX               DOSE      CrCl  12/18/2017  WARFARIN                 5mg  12/4/2017   WARFARIN                 5mg  11/13/2017  WARFARIN                 5mg  10/13/2017  WARFARIN                 5mg  OTHER CURRENT MEDICATIONS:  WARFARIN  PROGRESS NOTES: l/m for pt, retest inr in 2 to 3 wks    PATIENT INSTRUCTIONS:   TEST LOCATION: [facility], , ,   INBOUND LAB DATA:  Lab       Lab Value Col Date                 Rpt Date                 Lab  Reference Range  [facility] on 12/18/2017 2:05 PM EST

## 2018-01-23 NOTE — PROGRESS NOTES
REPORT NAME: Progress Notes Report  VISIT DATE: 12/4/2017  VISIT TIME: 2:39 PM EST  PATIENT NAME: Saira Payan  MEDICAL RECORD NUMBER: [id number]  YOB: 1927  AGE: 80  REFERRING PHYSICIAN: TAMEKA SINGH  SUPERVISING CLINICIAN: [facility] CARE PROVIDER: [facility]  PATIENT HOME ADDRESS: [address] [place] [address]  PATIENT HOME PHONE: [phone]  SOCIAL SECURITY NUMBER:   DIAGNOSIS 1: Unspecified atrial fibrillation / I48 91  DIAGNOSIS 2:   INR RANGE: 2 - 3  INR GOAL: 2 5  TREATMENT START DATE:   TREATMENT END DATE:   NEXT VISIT: 12/18/2017  Marita Mazariegosas Cardiology  VISIT RESULTS  ENCOUNTER NUMBER:   TEST LOCATION: [facility]  TEST TYPE: Outside Lab (Venipuncture)  VISIT TYPE:   CURRENT INR: 1 8 PROTIME:   SPECIMEN COL AND RPT DATE: 12/4/2017 2:39 PM  EST  VITAL SIGNS  PULSE:  BP: / WEIGHT:  HEIGHT:  TEMP:   CURRENT ANTICOAGULANT DOSING SCHEDULE  DOSE SIZE: 5mg    ANTICOAGULANT TYPE: WARFARIN  DOSING REGIMEN  Sun       Mon       Tues      Wed       Thurs     Fri       Sat  Total/Wk  7 5       5         7 5       5         7 5       5         7 5       45  PATIENT MEDICATION INSTRUCTION: No  PATIENT NUTRITIONAL COUNSELING: No  PATIENT BRUISING INSTRUCTION: No  LAST EDUCATION DATE:   PREVIOUS VISIT INFORMATION  VISITDATE  INRGoal INR   Sun    Mon    Tues   Wed    Thurs  Fri    Sat  Total/wk  12/4/2017   2 5     1 8   7 5    5      7 5    5      7 5    5      7 5  45  11/13/2017  2 5     2 04  7 5    5      7 5    5      7 5    5      5  42 5  10/13/2017  2 5     2 27  7 5    5      7 5    5      7 5    5      5  42 5  9/8/2017    2 5     2 3   7 5    5      7 5    5      7 5    5      5  42 5  ADDITIONAL PREVIOUS VISIT INFORMATION  VISITDATE   PRIMARY RX               DOSE      CrCl  12/4/2017   WARFARIN                 5mg  11/13/2017  WARFARIN                 5mg  10/13/2017  WARFARIN                 5mg  9/8/2017    WARFARIN                 5mg  OTHER CURRENT MEDICATIONS:  WARFARIN  PROGRESS NOTES: l/m for pt to retest inr in 2 wks, pt to call with any  missed doses    PATIENT INSTRUCTIONS:   TEST LOCATION: [facility], , ,   INBOUND LAB DATA:  Lab       Lab Value Col Date                 Rpt Date                 Lab  Reference Range  Electronically signed byTung Garcia on 12/4/2017 2:39 PM EST

## 2018-01-23 NOTE — RESULT NOTES
Verified Results  (1) PT WITH INR [id number] 08:40AM Olmsted Naveed Order Number: [id number]     Test Name Result Flag Reference   INR 2 50 H 0 86-1 16   PT 27 3 seconds H 12 1-14 4

## 2018-01-23 NOTE — RESULT NOTES
Verified Results  (1) PT WITH INR [id number] 08:53AM Estrellita Winona Order Number: [id number]     Test Name Result Flag Reference   INR 2 93 H 0 86-1 16   PT 31 0 seconds H 12 1-14 4

## 2018-01-25 DIAGNOSIS — I48.0 PAROXYSMAL ATRIAL FIBRILLATION (HCC): ICD-10-CM

## 2018-01-29 ENCOUNTER — ANTICOAG VISIT (OUTPATIENT)
Dept: CARDIOLOGY CLINIC | Facility: CLINIC | Age: 83
End: 2018-01-29

## 2018-01-29 ENCOUNTER — APPOINTMENT (OUTPATIENT)
Dept: LAB | Facility: CLINIC | Age: 83
End: 2018-01-29
Payer: MEDICARE

## 2018-01-29 DIAGNOSIS — I48.0 PAROXYSMAL ATRIAL FIBRILLATION (HCC): ICD-10-CM

## 2018-01-29 DIAGNOSIS — I48.91 ATRIAL FIBRILLATION, UNSPECIFIED TYPE (HCC): ICD-10-CM

## 2018-01-29 LAB
INR PPP: 2.43 (ref 0.86–1.16)
INR PPP: 2.43 (ref 0.86–1.16)
PROTHROMBIN TIME: 26.7 SECONDS (ref 12.1–14.4)

## 2018-01-29 PROCEDURE — 36415 COLL VENOUS BLD VENIPUNCTURE: CPT

## 2018-01-29 PROCEDURE — 85610 PROTHROMBIN TIME: CPT

## 2018-01-31 DIAGNOSIS — I48.91 ATRIAL FIBRILLATION, UNSPECIFIED TYPE (HCC): Primary | ICD-10-CM

## 2018-02-22 ENCOUNTER — APPOINTMENT (OUTPATIENT)
Dept: LAB | Facility: CLINIC | Age: 83
End: 2018-02-22
Payer: MEDICARE

## 2018-02-22 ENCOUNTER — ANTICOAG VISIT (OUTPATIENT)
Dept: CARDIOLOGY CLINIC | Facility: CLINIC | Age: 83
End: 2018-02-22

## 2018-02-22 DIAGNOSIS — I48.91 ATRIAL FIBRILLATION, UNSPECIFIED TYPE (HCC): ICD-10-CM

## 2018-02-22 DIAGNOSIS — I48.0 PAROXYSMAL ATRIAL FIBRILLATION (HCC): ICD-10-CM

## 2018-02-22 LAB
INR PPP: 2.06 (ref 0.86–1.16)
PROTHROMBIN TIME: 23.4 SECONDS (ref 12.1–14.4)

## 2018-02-22 PROCEDURE — 36415 COLL VENOUS BLD VENIPUNCTURE: CPT

## 2018-02-22 PROCEDURE — 85610 PROTHROMBIN TIME: CPT

## 2018-02-23 ENCOUNTER — TELEPHONE (OUTPATIENT)
Dept: INTERNAL MEDICINE CLINIC | Facility: CLINIC | Age: 83
End: 2018-02-23

## 2018-02-23 NOTE — TELEPHONE ENCOUNTER
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

## 2018-03-09 ENCOUNTER — APPOINTMENT (OUTPATIENT)
Dept: LAB | Facility: CLINIC | Age: 83
End: 2018-03-09
Payer: MEDICARE

## 2018-03-09 ENCOUNTER — ANTICOAG VISIT (OUTPATIENT)
Dept: CARDIOLOGY CLINIC | Facility: CLINIC | Age: 83
End: 2018-03-09

## 2018-03-09 ENCOUNTER — TRANSCRIBE ORDERS (OUTPATIENT)
Dept: LAB | Facility: CLINIC | Age: 83
End: 2018-03-09

## 2018-03-09 DIAGNOSIS — E11.9 TYPE 2 DIABETES MELLITUS WITHOUT COMPLICATIONS (HCC): ICD-10-CM

## 2018-03-09 DIAGNOSIS — I48.91 ATRIAL FIBRILLATION, UNSPECIFIED TYPE (HCC): ICD-10-CM

## 2018-03-09 DIAGNOSIS — E78.5 HYPERLIPIDEMIA: ICD-10-CM

## 2018-03-09 DIAGNOSIS — M05.9 SEROPOSITIVE RHEUMATOID ARTHRITIS (HCC): Primary | ICD-10-CM

## 2018-03-09 LAB
ALBUMIN SERPL BCP-MCNC: 3.3 G/DL (ref 3.5–5)
ALP SERPL-CCNC: 67 U/L (ref 46–116)
ALT SERPL W P-5'-P-CCNC: 25 U/L (ref 12–78)
ANION GAP SERPL CALCULATED.3IONS-SCNC: 5 MMOL/L (ref 4–13)
AST SERPL W P-5'-P-CCNC: 17 U/L (ref 5–45)
BILIRUB SERPL-MCNC: 0.41 MG/DL (ref 0.2–1)
BUN SERPL-MCNC: 16 MG/DL (ref 5–25)
CALCIUM SERPL-MCNC: 8.4 MG/DL (ref 8.3–10.1)
CHLORIDE SERPL-SCNC: 106 MMOL/L (ref 100–108)
CO2 SERPL-SCNC: 28 MMOL/L (ref 21–32)
CREAT SERPL-MCNC: 0.81 MG/DL (ref 0.6–1.3)
EST. AVERAGE GLUCOSE BLD GHB EST-MCNC: 146 MG/DL
GFR SERPL CREATININE-BSD FRML MDRD: 78 ML/MIN/1.73SQ M
GLUCOSE P FAST SERPL-MCNC: 126 MG/DL (ref 65–99)
HBA1C MFR BLD: 6.7 % (ref 4.2–6.3)
INR PPP: 2.96 (ref 0.86–1.16)
LDLC SERPL DIRECT ASSAY-MCNC: 55 MG/DL (ref 0–100)
POTASSIUM SERPL-SCNC: 4.1 MMOL/L (ref 3.5–5.3)
PROT SERPL-MCNC: 6.9 G/DL (ref 6.4–8.2)
PROTHROMBIN TIME: 31.2 SECONDS (ref 12.1–14.4)
SODIUM SERPL-SCNC: 139 MMOL/L (ref 136–145)

## 2018-03-09 PROCEDURE — 36415 COLL VENOUS BLD VENIPUNCTURE: CPT

## 2018-03-09 PROCEDURE — 83721 ASSAY OF BLOOD LIPOPROTEIN: CPT

## 2018-03-09 PROCEDURE — 85610 PROTHROMBIN TIME: CPT

## 2018-03-09 PROCEDURE — 80053 COMPREHEN METABOLIC PANEL: CPT

## 2018-03-09 PROCEDURE — 83036 HEMOGLOBIN GLYCOSYLATED A1C: CPT

## 2018-03-19 DIAGNOSIS — E11.9 TYPE 2 DIABETES MELLITUS WITHOUT COMPLICATIONS (HCC): ICD-10-CM

## 2018-03-19 DIAGNOSIS — E78.5 HYPERLIPIDEMIA: ICD-10-CM

## 2018-03-25 PROBLEM — K59.09 CHRONIC CONSTIPATION: Status: ACTIVE | Noted: 2017-04-11

## 2018-03-26 ENCOUNTER — OFFICE VISIT (OUTPATIENT)
Dept: INTERNAL MEDICINE CLINIC | Facility: CLINIC | Age: 83
End: 2018-03-26
Payer: MEDICARE

## 2018-03-26 VITALS
HEART RATE: 69 BPM | OXYGEN SATURATION: 98 % | SYSTOLIC BLOOD PRESSURE: 120 MMHG | BODY MASS INDEX: 25.03 KG/M2 | DIASTOLIC BLOOD PRESSURE: 60 MMHG | HEIGHT: 70 IN | WEIGHT: 174.8 LBS

## 2018-03-26 DIAGNOSIS — E78.5 HYPERLIPIDEMIA, UNSPECIFIED HYPERLIPIDEMIA TYPE: ICD-10-CM

## 2018-03-26 DIAGNOSIS — I10 ESSENTIAL HYPERTENSION: ICD-10-CM

## 2018-03-26 DIAGNOSIS — E11.9 DIABETES MELLITUS TYPE II, CONTROLLED (HCC): Primary | ICD-10-CM

## 2018-03-26 DIAGNOSIS — I48.0 PAROXYSMAL ATRIAL FIBRILLATION (HCC): ICD-10-CM

## 2018-03-26 DIAGNOSIS — E11.9 TYPE 2 DIABETES MELLITUS WITHOUT COMPLICATION, UNSPECIFIED LONG TERM INSULIN USE STATUS: Primary | ICD-10-CM

## 2018-03-26 DIAGNOSIS — I10 ESSENTIAL HYPERTENSION: Primary | ICD-10-CM

## 2018-03-26 DIAGNOSIS — M06.9 RHEUMATOID ARTHRITIS, INVOLVING UNSPECIFIED SITE, UNSPECIFIED RHEUMATOID FACTOR PRESENCE: ICD-10-CM

## 2018-03-26 DIAGNOSIS — E78.5 DYSLIPIDEMIA: ICD-10-CM

## 2018-03-26 DIAGNOSIS — M06.9 RHEUMATOID ARTHRITIS (HCC): ICD-10-CM

## 2018-03-26 DIAGNOSIS — E78.01 FAMILIAL HYPERCHOLESTEROLEMIA: ICD-10-CM

## 2018-03-26 DIAGNOSIS — I48.91 ATRIAL FIBRILLATION (HCC): ICD-10-CM

## 2018-03-26 PROCEDURE — 99214 OFFICE O/P EST MOD 30 MIN: CPT | Performed by: INTERNAL MEDICINE

## 2018-03-26 RX ORDER — LATANOPROST 50 UG/ML
0.01 SOLUTION/ DROPS OPHTHALMIC DAILY
COMMUNITY
Start: 2013-04-17

## 2018-03-26 RX ORDER — ACETAMINOPHEN 160 MG
1 TABLET,DISINTEGRATING ORAL DAILY
COMMUNITY
Start: 2015-10-09 | End: 2018-03-26 | Stop reason: SDUPTHER

## 2018-03-26 RX ORDER — DILTIAZEM HYDROCHLORIDE 120 MG/1
1 TABLET, FILM COATED ORAL DAILY
COMMUNITY
Start: 2013-05-02 | End: 2018-03-26 | Stop reason: SDUPTHER

## 2018-03-26 RX ORDER — ATORVASTATIN CALCIUM 10 MG/1
1 TABLET, FILM COATED ORAL
COMMUNITY
Start: 2015-06-16 | End: 2018-03-26 | Stop reason: SDUPTHER

## 2018-03-26 RX ORDER — FOLIC ACID 1 MG/1
2 TABLET ORAL DAILY
COMMUNITY
Start: 2013-01-21 | End: 2019-07-29 | Stop reason: ALTCHOICE

## 2018-03-26 RX ORDER — MULTIVITAMIN
1 TABLET ORAL DAILY
COMMUNITY
Start: 2013-01-21

## 2018-03-26 RX ORDER — ATORVASTATIN CALCIUM 10 MG/1
TABLET, FILM COATED ORAL
Qty: 90 TABLET | Refills: 3 | Status: SHIPPED | OUTPATIENT
Start: 2018-03-26 | End: 2019-04-24 | Stop reason: SDUPTHER

## 2018-03-26 RX ORDER — DIPHENHYDRAMINE HCL 25 MG
1 CAPSULE ORAL 2 TIMES DAILY
COMMUNITY
Start: 2013-05-02

## 2018-03-26 RX ORDER — WARFARIN SODIUM 7.5 MG/1
1 TABLET ORAL DAILY
COMMUNITY
Start: 2013-06-14 | End: 2018-04-11 | Stop reason: SDUPTHER

## 2018-03-26 RX ORDER — AMLODIPINE BESYLATE 5 MG/1
1 TABLET ORAL DAILY
COMMUNITY
Start: 2012-11-13 | End: 2019-04-24 | Stop reason: SDUPTHER

## 2018-03-26 RX ORDER — WARFARIN SODIUM 5 MG/1
1 TABLET ORAL DAILY
COMMUNITY
Start: 2013-01-21 | End: 2018-04-11 | Stop reason: SDUPTHER

## 2018-03-26 RX ORDER — DILTIAZEM HYDROCHLORIDE 120 MG/1
TABLET, FILM COATED ORAL
Qty: 90 TABLET | Refills: 3 | Status: SHIPPED | OUTPATIENT
Start: 2018-03-26 | End: 2018-07-03 | Stop reason: ALTCHOICE

## 2018-03-26 RX ORDER — LISINOPRIL 10 MG/1
1 TABLET ORAL DAILY
COMMUNITY
Start: 2010-12-02 | End: 2019-04-24 | Stop reason: SDUPTHER

## 2018-03-26 RX ORDER — AMLODIPINE BESYLATE 5 MG/1
TABLET ORAL
Qty: 90 TABLET | Refills: 3 | Status: SHIPPED | OUTPATIENT
Start: 2018-03-26 | End: 2018-03-26 | Stop reason: SDUPTHER

## 2018-03-26 RX ORDER — LISINOPRIL 10 MG/1
TABLET ORAL
Qty: 90 TABLET | Refills: 3 | Status: SHIPPED | OUTPATIENT
Start: 2018-03-26 | End: 2018-03-26 | Stop reason: SDUPTHER

## 2018-03-26 RX ORDER — SWAB
4 SWAB, NON-MEDICATED MISCELLANEOUS DAILY
COMMUNITY
Start: 2013-01-21

## 2018-03-26 NOTE — PATIENT INSTRUCTIONS
Please call between visits for any questions or problems and continue her current medications and optimal lifestyle  Your next visit will be in 3 months and please have your lab work drawn June 9th or slightly thereafter

## 2018-03-26 NOTE — PROGRESS NOTES
Assessment/Plan    Diabetes type 2:  Continue optimal lifestyle, current medications, with no associated hypoglycemia and hemoglobin A1c is stable overall, well controlled at 6 7  Recheck hemoglobin A1c and BMP in 3 months and check at next visit soon thereafter  Essential hypertension:  Home blood pressure readings are optimal without orthostatic lightheadedness, with no recent change in medication  Paroxysmal atrial fibrillation:  Asymptomatic for quite some time and clinically in sinus rhythm, no clinical bleeding with Coumadin, no bradycardia or tachycardia on diltiazem  Seropositive rheumatoid arthritis, no symptoms in quite some time on low-dose methotrexate, with active follow-up with Rheumatology as well  Continue current treatment  Dyslipidemia:  LDL cholesterol is optimal at 55 without side effects on statin therapy  Recheck LDL just before next visit in 3 months  Problem List Items Addressed This Visit     Diabetes mellitus type II, controlled ([facility] [address] ) - Primary    Dyslipidemia    Hypertension    Paroxysmal atrial fibrillation (HCC)    Rheumatoid arthritis (HCC)          Subjective   Patient ID: Shandra Bell is a 80 y o  male  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80, 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[employer], 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    He has had no illnesses since last visit  We reviewed his medical problems and his recent labs of March 9th  He has had no symptoms of low blood sugar or high blood sugar with type 2 diabetes with hemoglobin A1c of 6 7  Metabolic profile is stable including normal GFR of 78, normal potassium  He also has had no illnesses and is immunocompromised because advanced age, diabetes, and active therapy with methotrexate with rheumatoid arthritis  He has had no flares of rheumatoid arthritis in quite some time    He is up-to-date in follow-up with his rheumatologist   He also is without palpitations, lightheadedness or clinical bleeding with paroxysmal atrial fibrillation on Coumadin and diltiazem  He also has no orthostatic lightheadedness  Hypertension is well controlled and home readings were reviewed which are optimal, and mild pitting edema of lower legs is not worse, no phlebitic symptoms  Appetite is good, he still plays golf regularly, moving his bowels about the same with constipation relieved with laxatives every 3 days  He has no chest pain or shortness of breath  Vitals:    03/26/18 0841   BP: 120/60   Pulse: 69   SpO2: 98%     HPI    The following portions of the patient's history were reviewed and updated as appropriate: allergies, current medications, past family history, past medical history, past social history, past surgical history and problem list     Review of Systems moved bowels about every 3 days, and also please see above, all others negative  Objective   Physical Exam   Vital signs stable, pulse regular, appearing much younger than stated age, hearing fairly well compensated with hearing aids  Skin without pallor or icterus  Joints without inflammation, no JVD  No carotid bruits in carotid pulses normal   Trachea midline without stridor  Head neck without adenopathy or mass  Oral cavity and throat normal   Lungs clear  Cardiac:  Regular rate and rhythm, normal S1 and S2, no murmur, no S4 or S3  There are normal bowel sounds the abdomen is nondistended and nontender  There is +2 edema of lower legs with mild to moderate varicosities without phlebitic findings or calf tenderness  Skin integrity of the feet, ankles etc is optimal   Peripheral circulation is symmetric normal   Gait is stable    Cognitive status is entirely intact mood is optimal

## 2018-03-28 ENCOUNTER — APPOINTMENT (OUTPATIENT)
Dept: LAB | Facility: CLINIC | Age: 83
End: 2018-03-28
Payer: MEDICARE

## 2018-03-28 ENCOUNTER — ANTICOAG VISIT (OUTPATIENT)
Dept: CARDIOLOGY CLINIC | Facility: CLINIC | Age: 83
End: 2018-03-28

## 2018-03-28 DIAGNOSIS — I48.91 ATRIAL FIBRILLATION, UNSPECIFIED TYPE (HCC): ICD-10-CM

## 2018-03-28 LAB
INR PPP: 2.44 (ref 0.86–1.16)
PROTHROMBIN TIME: 26.8 SECONDS (ref 12.1–14.4)

## 2018-03-28 PROCEDURE — 85610 PROTHROMBIN TIME: CPT

## 2018-03-28 PROCEDURE — 36415 COLL VENOUS BLD VENIPUNCTURE: CPT

## 2018-04-11 DIAGNOSIS — I48.91 ATRIAL FIBRILLATION (HCC): Primary | ICD-10-CM

## 2018-04-11 RX ORDER — WARFARIN SODIUM 7.5 MG/1
TABLET ORAL DAILY
Qty: 90 TABLET | Refills: 3 | Status: SHIPPED | OUTPATIENT
Start: 2018-04-11 | End: 2019-04-24 | Stop reason: SDUPTHER

## 2018-04-11 RX ORDER — WARFARIN SODIUM 5 MG/1
TABLET ORAL
Qty: 90 TABLET | Refills: 3 | Status: SHIPPED | OUTPATIENT
Start: 2018-04-11 | End: 2019-04-24 | Stop reason: SDUPTHER

## 2018-04-18 ENCOUNTER — APPOINTMENT (OUTPATIENT)
Dept: LAB | Facility: CLINIC | Age: 83
End: 2018-04-18
Payer: MEDICARE

## 2018-04-18 ENCOUNTER — ANTICOAG VISIT (OUTPATIENT)
Dept: CARDIOLOGY CLINIC | Facility: CLINIC | Age: 83
End: 2018-04-18

## 2018-04-18 DIAGNOSIS — I48.91 ATRIAL FIBRILLATION, UNSPECIFIED TYPE (HCC): ICD-10-CM

## 2018-04-18 DIAGNOSIS — I48.0 PAROXYSMAL ATRIAL FIBRILLATION (HCC): ICD-10-CM

## 2018-04-18 LAB
INR PPP: 1.91 (ref 0.86–1.16)
PROTHROMBIN TIME: 22.1 SECONDS (ref 12.1–14.4)

## 2018-04-18 PROCEDURE — 36415 COLL VENOUS BLD VENIPUNCTURE: CPT

## 2018-04-18 PROCEDURE — 85610 PROTHROMBIN TIME: CPT

## 2018-05-02 ENCOUNTER — APPOINTMENT (OUTPATIENT)
Dept: LAB | Facility: CLINIC | Age: 83
End: 2018-05-02
Payer: MEDICARE

## 2018-05-02 ENCOUNTER — ANTICOAG VISIT (OUTPATIENT)
Dept: CARDIOLOGY CLINIC | Facility: CLINIC | Age: 83
End: 2018-05-02

## 2018-05-02 DIAGNOSIS — I48.0 PAROXYSMAL ATRIAL FIBRILLATION (HCC): ICD-10-CM

## 2018-05-02 DIAGNOSIS — I48.91 ATRIAL FIBRILLATION, UNSPECIFIED TYPE (HCC): ICD-10-CM

## 2018-05-02 LAB
INR PPP: 2.15 (ref 0.86–1.16)
PROTHROMBIN TIME: 24.2 SECONDS (ref 12.1–14.4)

## 2018-05-02 PROCEDURE — 36415 COLL VENOUS BLD VENIPUNCTURE: CPT

## 2018-05-02 PROCEDURE — 85610 PROTHROMBIN TIME: CPT

## 2018-05-21 ENCOUNTER — ANTICOAG VISIT (OUTPATIENT)
Dept: CARDIOLOGY CLINIC | Facility: CLINIC | Age: 83
End: 2018-05-21

## 2018-05-21 ENCOUNTER — APPOINTMENT (OUTPATIENT)
Dept: LAB | Facility: CLINIC | Age: 83
End: 2018-05-21
Payer: MEDICARE

## 2018-05-21 DIAGNOSIS — I48.91 ATRIAL FIBRILLATION, UNSPECIFIED TYPE (HCC): ICD-10-CM

## 2018-05-21 LAB
INR PPP: 2.48 (ref 0.86–1.17)
PROTHROMBIN TIME: 26.9 SECONDS (ref 11.8–14.2)

## 2018-05-21 PROCEDURE — 36415 COLL VENOUS BLD VENIPUNCTURE: CPT

## 2018-05-21 PROCEDURE — 85610 PROTHROMBIN TIME: CPT

## 2018-05-23 ENCOUNTER — OFFICE VISIT (OUTPATIENT)
Dept: CARDIOLOGY CLINIC | Facility: CLINIC | Age: 83
End: 2018-05-23
Payer: MEDICARE

## 2018-05-23 VITALS
WEIGHT: 175 LBS | HEIGHT: 70 IN | HEART RATE: 66 BPM | DIASTOLIC BLOOD PRESSURE: 56 MMHG | BODY MASS INDEX: 25.05 KG/M2 | SYSTOLIC BLOOD PRESSURE: 118 MMHG

## 2018-05-23 DIAGNOSIS — I44.7 LEFT BUNDLE-BRANCH BLOCK: ICD-10-CM

## 2018-05-23 DIAGNOSIS — I48.0 PAROXYSMAL ATRIAL FIBRILLATION (HCC): Primary | ICD-10-CM

## 2018-05-23 DIAGNOSIS — E78.5 DYSLIPIDEMIA: ICD-10-CM

## 2018-05-23 DIAGNOSIS — I10 ESSENTIAL HYPERTENSION: ICD-10-CM

## 2018-05-23 PROCEDURE — 93000 ELECTROCARDIOGRAM COMPLETE: CPT | Performed by: INTERNAL MEDICINE

## 2018-05-23 PROCEDURE — 99214 OFFICE O/P EST MOD 30 MIN: CPT | Performed by: INTERNAL MEDICINE

## 2018-05-23 NOTE — PROGRESS NOTES
Cardiology Follow Up    Sigrid Washington  1/19/1927  [id number]  [address] [facility]  [address] [place] [address] [address]    1  Paroxysmal atrial fibrillation (HCC)  POCT ECG   2  Essential hypertension     3  Left bundle-branch block     4  Dyslipidemia         Discussion/Summary:Overall he has been doing well from a cardiac standpoint  He is currently maintaining sinus rhythm  No palpitations or symptoms of AFib  He is tolerating anticoagulation well Coumadin has been therapeutic would continue with this regimen  Blood pressures been well controlled  He did have some complaints of constipation he thinks this is related to his Lipitor  I have asked him to take a 2 week holiday from the Lipitor and see if his symptoms improve  If not my next guess would be this is coming from the diltiazem and we could change him  Interval History:   15-year-old gentleman history of paroxysmal atrial fibrillation currently maintaining sinus rhythm on anticoagulation, hypertension, hyperlipidemia, incomplete left bundle branch block presents for routine scheduled follow-up appointment  Overall he has been doing well he walks 18 holes on the golf [place]  Denies any exertional chest pain, dyspnea, lightheadedness, dizziness, or syncope  He remains well hydrated  He has had some difficulty with constipation which he feels is due to his Lipitor dose  He also takes Cardizem which could be a likely culprit  There has been no significant lower extremity edema, PND, orthopnea  He has had no adverse bleeding events on anticoagulation      Problem List     Atrial fibrillation ([address] ) [I48 91]    Arthritis    Overview Signed 3/25/2018 11:55 AM by Devin Frazier MD     Description: DJD         Benign prostatic hyperplasia    Chronic constipation    Diabetes mellitus type II, controlled ([address] )    Diverticulosis    Overview Signed 3/25/2018 11:55 AM by Kris Manuel MD     Description: C BLEEDING         Dyslipidemia    History of prostate cancer    Hypertension    Impaired fasting glucose    Left bundle-branch block    Overview Signed 3/25/2018 11:55 AM by Kris Manuel MD     Description: CHRONIC         Paroxysmal atrial fibrillation (HCC)    Rheumatoid arthritis ([facility] )    Seropositive rheumatoid arthritis ([place] [address] )    Spondylosis of cervical region without myelopathy or radiculopathy    Venous insufficiency        Past Medical History:   Diagnosis Date    A-fib ([place] [address] )     Basal cell carcinoma     10/9/15    Malignant neoplasm of prostate ([place] [address] )     10/9/15    Rheumatoid arthritis ([place] [address] )      Social History     Social History    Marital status: Single     Spouse name: N/A    Number of children: N/A    Years of education: N/A     Occupational History    Not on file       Social History Main Topics    Smoking status: Former Smoker    Smokeless tobacco: Former User    Alcohol use No      Comment: per [employer], being a social drinker    Drug use: No    Sexual activity: Not on file     Other Topics Concern    Not on file     Social History Narrative    No narrative on file      Family History   Problem Relation Age of Onset    Diabetes Mother     Lung cancer Mother     Heart disease Father     Stroke Father      syndrome     Past Surgical History:   Procedure Laterality Date    CATARACT EXTRACTION      2000    CORONARY ANGIOPLASTY WITH STENT PLACEMENT      7/2011, HCG/LVHCC    HEMORRHOID SURGERY      HERNIA REPAIR      PROSTATE SURGERY         Current Outpatient Prescriptions:     amLODIPine (NORVASC) 5 mg tablet, Take 1 tablet by mouth daily, Disp: , Rfl:     ARTIFICIAL TEARS 0 1-0 3 % SOLN, Apply 1 drop to eye 2 (two) times a day, Disp: , Rfl:     aspirin 81 MG tablet, Take 1 tablet by mouth daily, Disp: , Rfl:     atorvastatin (LIPITOR) 10 mg tablet, TAKE 1 TABLET BY MOUTH AT  BEDTIME, Disp: 90 tablet, Rfl: 3    bisacodyl (DULCOLAX) 5 mg EC tablet, Take 1 tablet by mouth daily as needed, Disp: , Rfl:     Cholecalciferol 2000 units CAPS, Take 1 capsule by mouth daily, Disp: , Rfl:     diltiazem (CARDIZEM) 120 MG tablet, TAKE 1 TABLET BY MOUTH  DAILY, Disp: 90 tablet, Rfl: 3    folic acid (FOLVITE) 1 mg tablet, Take 2 tablets by mouth daily  , Disp: , Rfl:     latanoprost (XALATAN) 0 005 % ophthalmic solution, Apply 0 005 % to eye daily, Disp: , Rfl:     lisinopril (ZESTRIL) 10 mg tablet, Take 1 tablet by mouth daily, Disp: , Rfl:     methotrexate 2 5 mg tablet, Take 2 tablets by mouth every 7 days  , Disp: , Rfl:     Multiple Vitamin tablet, Take 1 tablet by mouth daily, Disp: , Rfl:     Omega-3 Fatty Acids (FISH OIL CONCENTRATE) 1000 MG CAPS, Take 4 capsules by mouth daily, Disp: , Rfl:     warfarin (COUMADIN) 5 mg tablet, TAKE 1 TABLET BY MOUTH  DAILY AS DIRECTED, Disp: 90 tablet, Rfl: 3    warfarin (COUMADIN) 7 5 mg tablet, TAKE 1 TABLET BY MOUTH  DAILY, Disp: 90 tablet, Rfl: 3  No Known Allergies    Labs:     Chemistry        Component Value Date/Time     03/09/2018 0817     12/02/2015 0819    K 4 1 03/09/2018 0817    K 3 8 12/02/2015 0819     03/09/2018 0817     (H) 12/02/2015 0819    CO2 28 03/09/2018 0817    CO2 26 12/02/2015 0819    BUN 16 03/09/2018 0817    BUN 17 12/02/2015 0819    CREATININE 0 81 03/09/2018 0817    CREATININE 0 82 12/02/2015 0819        Component Value Date/Time    CALCIUM 8 4 03/09/2018 0817    CALCIUM 8 4 12/02/2015 0819    ALKPHOS 67 03/09/2018 0817    ALKPHOS 68 12/02/2015 0819    AST 17 03/09/2018 0817    AST 14 12/02/2015 0819    ALT 25 03/09/2018 0817    ALT 35 12/02/2015 0819    BILITOT 0 41 03/09/2018 0817    BILITOT 0 49 12/02/2015 0819            Lab Results   Component Value Date    CHOL 134 04/04/2017    CHOL 141 12/30/2016    CHOL 141 02/09/2016     Lab Results   Component Value Date    HDL 75 (H) 04/04/2017    HDL 84 (H) 12/30/2016    HDL 74 (H) 02/09/2016     Lab Results Component Value Date    LDLCALC 52 04/04/2017    LDLCALC 50 12/30/2016    LDLCALC 59 02/09/2016     Lab Results   Component Value Date    TRIG 37 04/04/2017    TRIG 34 12/30/2016    TRIG 42 02/09/2016     No components found for: CHOLHDL    Imaging: No results found  ECG:    Sinus rhythm 1st degree AV block nonspecific interventricular conduction delay  ROS    Vitals:    05/23/18 0931   BP: 118/56   Pulse: 66     Vitals:    05/23/18 0931   Weight: 79 4 kg (175 lb)     Height: 5' 9 5" (176 5 cm)   Body mass index is 25 47 kg/m²      Physical Exam:   General appearance:  Appears stated age, alert, well appearing and in no distress  HEENT:  PERRLA, EOMI, no scleral icterus, no conjunctival pallor  NECK:  Supple, No elevated JVP, no thyromegaly, no carotid bruits  HEART:  Regular rate and rhythm, normal S1/S2, no S3/S4, no murmur or rub  LUNGS:  Clear to auscultation bilaterally, no wheezes rales or rhonchi  ABDOMEN:  Soft, non-tender, positive bowel sounds, no rebound or guarding, no organomegaly   EXTREMITIES:  No edema, normal range of motion  VASCULAR:  Normal pedal pulses, good pulse volume   SKIN: No lesions or rashes on exposed skin  NEURO:  CN II-XII intact, no focal deficits

## 2018-06-05 ENCOUNTER — TELEPHONE (OUTPATIENT)
Dept: CARDIOLOGY CLINIC | Facility: CLINIC | Age: 83
End: 2018-06-05

## 2018-06-05 NOTE — TELEPHONE ENCOUNTER
Pt stated constipation has improved somewhat since stopping Lipitor  He only took Dulcolax 1x  Pt asking what you would like to do next?   Prefers to be called at work [phone], ok to leave a message

## 2018-06-22 ENCOUNTER — ANTICOAG VISIT (OUTPATIENT)
Dept: CARDIOLOGY CLINIC | Facility: CLINIC | Age: 83
End: 2018-06-22

## 2018-06-22 ENCOUNTER — APPOINTMENT (OUTPATIENT)
Dept: LAB | Facility: CLINIC | Age: 83
End: 2018-06-22
Payer: MEDICARE

## 2018-06-22 DIAGNOSIS — I48.91 ATRIAL FIBRILLATION, UNSPECIFIED TYPE (HCC): ICD-10-CM

## 2018-06-22 DIAGNOSIS — E78.5 HYPERLIPIDEMIA, UNSPECIFIED HYPERLIPIDEMIA TYPE: ICD-10-CM

## 2018-06-22 DIAGNOSIS — E11.9 TYPE 2 DIABETES MELLITUS WITHOUT COMPLICATION (HCC): ICD-10-CM

## 2018-06-22 DIAGNOSIS — M06.9 RHEUMATOID ARTHRITIS, INVOLVING UNSPECIFIED SITE, UNSPECIFIED RHEUMATOID FACTOR PRESENCE: ICD-10-CM

## 2018-06-22 LAB
ANION GAP SERPL CALCULATED.3IONS-SCNC: 6 MMOL/L (ref 4–13)
BASOPHILS # BLD AUTO: 0.01 THOUSANDS/ΜL (ref 0–0.1)
BASOPHILS NFR BLD AUTO: 0 % (ref 0–1)
BUN SERPL-MCNC: 21 MG/DL (ref 5–25)
CALCIUM SERPL-MCNC: 8.2 MG/DL (ref 8.3–10.1)
CHLORIDE SERPL-SCNC: 106 MMOL/L (ref 100–108)
CHOLEST SERPL-MCNC: 143 MG/DL (ref 50–200)
CO2 SERPL-SCNC: 27 MMOL/L (ref 21–32)
CREAT SERPL-MCNC: 0.88 MG/DL (ref 0.6–1.3)
EOSINOPHIL # BLD AUTO: 0.08 THOUSAND/ΜL (ref 0–0.61)
EOSINOPHIL NFR BLD AUTO: 2 % (ref 0–6)
ERYTHROCYTE [DISTWIDTH] IN BLOOD BY AUTOMATED COUNT: 12.8 % (ref 11.6–15.1)
EST. AVERAGE GLUCOSE BLD GHB EST-MCNC: 160 MG/DL
GFR SERPL CREATININE-BSD FRML MDRD: 75 ML/MIN/1.73SQ M
GLUCOSE P FAST SERPL-MCNC: 128 MG/DL (ref 65–99)
HBA1C MFR BLD: 7.2 % (ref 4.2–6.3)
HCT VFR BLD AUTO: 37.8 % (ref 36.5–49.3)
HDLC SERPL-MCNC: 70 MG/DL (ref 40–60)
HGB BLD-MCNC: 12.7 G/DL (ref 12–17)
IMM GRANULOCYTES # BLD AUTO: 0.03 THOUSAND/UL (ref 0–0.2)
IMM GRANULOCYTES NFR BLD AUTO: 1 % (ref 0–2)
INR PPP: 2.11 (ref 0.86–1.17)
LDLC SERPL CALC-MCNC: 65 MG/DL (ref 0–100)
LYMPHOCYTES # BLD AUTO: 1.07 THOUSANDS/ΜL (ref 0.6–4.47)
LYMPHOCYTES NFR BLD AUTO: 20 % (ref 14–44)
MCH RBC QN AUTO: 32 PG (ref 26.8–34.3)
MCHC RBC AUTO-ENTMCNC: 33.6 G/DL (ref 31.4–37.4)
MCV RBC AUTO: 95 FL (ref 82–98)
MONOCYTES # BLD AUTO: 0.47 THOUSAND/ΜL (ref 0.17–1.22)
MONOCYTES NFR BLD AUTO: 9 % (ref 4–12)
NEUTROPHILS # BLD AUTO: 3.58 THOUSANDS/ΜL (ref 1.85–7.62)
NEUTS SEG NFR BLD AUTO: 68 % (ref 43–75)
NRBC BLD AUTO-RTO: 0 /100 WBCS
PLATELET # BLD AUTO: 220 THOUSANDS/UL (ref 149–390)
PMV BLD AUTO: 9 FL (ref 8.9–12.7)
POTASSIUM SERPL-SCNC: 3.9 MMOL/L (ref 3.5–5.3)
PROTHROMBIN TIME: 23.7 SECONDS (ref 11.8–14.2)
RBC # BLD AUTO: 3.97 MILLION/UL (ref 3.88–5.62)
SODIUM SERPL-SCNC: 139 MMOL/L (ref 136–145)
TRIGL SERPL-MCNC: 40 MG/DL
WBC # BLD AUTO: 5.24 THOUSAND/UL (ref 4.31–10.16)

## 2018-06-22 PROCEDURE — 80048 BASIC METABOLIC PNL TOTAL CA: CPT

## 2018-06-22 PROCEDURE — 83036 HEMOGLOBIN GLYCOSYLATED A1C: CPT

## 2018-06-22 PROCEDURE — 85610 PROTHROMBIN TIME: CPT

## 2018-06-22 PROCEDURE — 85025 COMPLETE CBC W/AUTO DIFF WBC: CPT

## 2018-06-22 PROCEDURE — 80061 LIPID PANEL: CPT

## 2018-06-22 PROCEDURE — 36415 COLL VENOUS BLD VENIPUNCTURE: CPT

## 2018-06-29 ENCOUNTER — OFFICE VISIT (OUTPATIENT)
Dept: INTERNAL MEDICINE CLINIC | Facility: CLINIC | Age: 83
End: 2018-06-29
Payer: MEDICARE

## 2018-06-29 VITALS
OXYGEN SATURATION: 98 % | HEIGHT: 70 IN | HEART RATE: 60 BPM | DIASTOLIC BLOOD PRESSURE: 60 MMHG | BODY MASS INDEX: 25.05 KG/M2 | SYSTOLIC BLOOD PRESSURE: 120 MMHG | WEIGHT: 175 LBS

## 2018-06-29 DIAGNOSIS — M05.9 SEROPOSITIVE RHEUMATOID ARTHRITIS (HCC): ICD-10-CM

## 2018-06-29 DIAGNOSIS — E11.9 CONTROLLED TYPE 2 DIABETES MELLITUS WITHOUT COMPLICATION, WITHOUT LONG-TERM CURRENT USE OF INSULIN (HCC): ICD-10-CM

## 2018-06-29 DIAGNOSIS — E78.5 HYPERLIPIDEMIA, UNSPECIFIED HYPERLIPIDEMIA TYPE: Primary | ICD-10-CM

## 2018-06-29 DIAGNOSIS — I10 ESSENTIAL HYPERTENSION: ICD-10-CM

## 2018-06-29 DIAGNOSIS — E78.5 DYSLIPIDEMIA: ICD-10-CM

## 2018-06-29 PROCEDURE — 99214 OFFICE O/P EST MOD 30 MIN: CPT | Performed by: INTERNAL MEDICINE

## 2018-06-29 NOTE — PATIENT INSTRUCTIONS
Please call for any problems between visits  Your next visit will be in early October  Please have your lab work drawn at the end of September  Please contact Dr Nathaly Savage regarding consideration of adjusting diltiazem

## 2018-06-29 NOTE — PROGRESS NOTES
Assessment/Plan   Problem List Items Addressed This Visit     Diabetes mellitus type II, controlled ([facility] ) - Primary    Dyslipidemia    Hypertension    Seropositive rheumatoid arthritis ([facility] )      At 80years of age, Father Hima's quality of life and health her excellent  He manages his chronic medical conditions very well  There seems to be a trend towards some burn out aspect of his rheumatoid arthritis, with essentially no symptoms with diminishing therapy  Balance and strength were excellent  Cognitively there are no deficits which is truly remarkable  He is still quite active academically, and also plays golf regularly without any difficulties  Diabetes is well controlled when this was discussed today  Lab work is stable overall  His next visit will be in early October and was given a lab slip for late September  He was encouraged to call for any questions or problems  Next visit, will perform diabetic foot exam and check on eye care  Subjective   Patient ID: Rachana Mcdonough is a 80 y o  male  He is here today for a 3 month checkup for chronic illness management  Vitals:    06/29/18 1533   BP: 120/60   Pulse: 60   SpO2: 98%     HPI   He continues to look and act much younger than his age of 80  He has a truly amazing gentleman who continues to do academic work, plays golf regularly, and has no deficits of activities of daily living and no evidence of any cognitive decline whatsoever  He has had some recent problems with constipation, and saw his cardiologist on May 23rd and atorvastatin was stopped temporarily which did not resolve his constipation  The next step was to be consideration for adjusting dose of Cardizem or changing medications and we agreed that he would contact his cardiologist about this next step  He is using Dulcolox about every 3 days, staying well hydrated and he will not have a bowel movement and less he uses Dulcolax      Regarding chronic atrial fibrillation, he has no palpitations or lightheadedness, no declining exercise tolerance or syncope or near syncope  Hypertension continues to be well controlled  There is no orthostatic lightheadedness and we reviewed his home blood pressure readings which are averaging in the 116 over 60  He has some osteoarthritis related decreased range of motion of his fingers, but no pain  Importantly, he has no symptoms of rheumatoid arthritis, and his rheumatologist's has taper down his dose of methotrexate with no flare symptoms  We discussed diabetes and he has no symptoms of hyperglycemia or hypoglycemia, and his diabetes is now controlled without medication  His June 22nd labs show hemoglobin A1c of 7 2, fasting glucose 128  CBC was normal including no leukopenia on methotrexate, and lipids are optimal including LDL cholesterol 70  GFR remains superior at 75 with potassium of 3 9  Slight decrease in calcium was noted 8 2 but this is most likely lab related  There are no symptoms of hypocalcemia  There are no side effects on atorvastatin  The following portions of the patient's history were reviewed and updated as appropriate: allergies, current medications, past family history, past medical history, past social history, past surgical history and problem list     Review of Systems    Objective   Physical Exam   Vital signs stable with regular pulse  31-year-old gentleman who appears much younger than stated age  Hearing and vision are excellent as is strength and balance  He is in good spirits  No JVD, no carotid bruits in carotid pulses normal   Lungs clear throughout  Cardiac:  Irregular regular rhythm, no S3 and no murmur, PMI fifth intercostal space midclavicular line  Peripheral circulation is intact, there is no edema, skin integrity excellent        Patient Active Problem List   Diagnosis    Atrial fibrillation ([address] ) [I48 91]    Benign prostatic hyperplasia    Chronic constipation    Diabetes mellitus type II, controlled ([facility] )    Diverticulosis    Dyslipidemia    History of prostate cancer    Hypertension    Left bundle-branch block    Paroxysmal atrial fibrillation (HCC)    Seropositive rheumatoid arthritis (HCC)    Spondylosis of cervical region without myelopathy or radiculopathy    Venous insufficiency   ,  Current Outpatient Prescriptions:     amLODIPine (NORVASC) 5 mg tablet, Take 1 tablet by mouth daily, Disp: , Rfl:     ARTIFICIAL TEARS 0 1-0 3 % SOLN, Apply 1 drop to eye 2 (two) times a day, Disp: , Rfl:     aspirin 81 MG tablet, Take 1 tablet by mouth daily, Disp: , Rfl:     atorvastatin (LIPITOR) 10 mg tablet, TAKE 1 TABLET BY MOUTH AT  BEDTIME, Disp: 90 tablet, Rfl: 3    bisacodyl (DULCOLAX) 5 mg EC tablet, Take 1 tablet by mouth daily as needed, Disp: , Rfl:     Cholecalciferol 2000 units CAPS, Take 1 capsule by mouth daily, Disp: , Rfl:     diltiazem (CARDIZEM) 120 MG tablet, TAKE 1 TABLET BY MOUTH  DAILY, Disp: 90 tablet, Rfl: 3    folic acid (FOLVITE) 1 mg tablet, Take 2 tablets by mouth daily  , Disp: , Rfl:     latanoprost (XALATAN) 0 005 % ophthalmic solution, Apply 0 005 % to eye daily, Disp: , Rfl:     lisinopril (ZESTRIL) 10 mg tablet, Take 1 tablet by mouth daily, Disp: , Rfl:     methotrexate 2 5 mg tablet, Take 2 tablets by mouth every 7 days  , Disp: , Rfl:     Multiple Vitamin tablet, Take 1 tablet by mouth daily, Disp: , Rfl:     Omega-3 Fatty Acids (FISH OIL CONCENTRATE) 1000 MG CAPS, Take 4 capsules by mouth daily, Disp: , Rfl:     warfarin (COUMADIN) 5 mg tablet, TAKE 1 TABLET BY MOUTH  DAILY AS DIRECTED, Disp: 90 tablet, Rfl: 3    warfarin (COUMADIN) 7 5 mg tablet, TAKE 1 TABLET BY MOUTH  DAILY, Disp: 90 tablet, Rfl: 3

## 2018-07-02 ENCOUNTER — TELEPHONE (OUTPATIENT)
Dept: CARDIOLOGY CLINIC | Facility: CLINIC | Age: 83
End: 2018-07-02

## 2018-07-02 NOTE — TELEPHONE ENCOUNTER
Aniya Chavez reports he still has constipation since decreasing the atorvastatin  He said you mentioned that cardizem may also be a cause and is asking for new recommendation  Nery Davies     Pt cb [phone]

## 2018-07-03 DIAGNOSIS — I10 ESSENTIAL HYPERTENSION: Primary | ICD-10-CM

## 2018-07-20 DIAGNOSIS — I10 ESSENTIAL HYPERTENSION: ICD-10-CM

## 2018-07-23 ENCOUNTER — APPOINTMENT (OUTPATIENT)
Dept: LAB | Facility: CLINIC | Age: 83
End: 2018-07-23
Payer: MEDICARE

## 2018-07-23 ENCOUNTER — ANTICOAG VISIT (OUTPATIENT)
Dept: CARDIOLOGY CLINIC | Facility: CLINIC | Age: 83
End: 2018-07-23

## 2018-07-23 DIAGNOSIS — I48.91 ATRIAL FIBRILLATION, UNSPECIFIED TYPE (HCC): ICD-10-CM

## 2018-07-23 LAB
INR PPP: 2.42 (ref 0.86–1.17)
PROTHROMBIN TIME: 26.4 SECONDS (ref 11.8–14.2)

## 2018-07-23 PROCEDURE — 36415 COLL VENOUS BLD VENIPUNCTURE: CPT

## 2018-07-23 PROCEDURE — 85610 PROTHROMBIN TIME: CPT

## 2018-08-22 ENCOUNTER — APPOINTMENT (OUTPATIENT)
Dept: LAB | Facility: CLINIC | Age: 83
End: 2018-08-22
Payer: MEDICARE

## 2018-08-22 ENCOUNTER — ANTICOAG VISIT (OUTPATIENT)
Dept: CARDIOLOGY CLINIC | Facility: CLINIC | Age: 83
End: 2018-08-22

## 2018-08-22 DIAGNOSIS — I48.91 ATRIAL FIBRILLATION, UNSPECIFIED TYPE (HCC): ICD-10-CM

## 2018-08-22 LAB
INR PPP: 2.82 (ref 0.86–1.17)
PROTHROMBIN TIME: 29.7 SECONDS (ref 11.8–14.2)

## 2018-08-22 PROCEDURE — 85610 PROTHROMBIN TIME: CPT

## 2018-08-22 PROCEDURE — 36415 COLL VENOUS BLD VENIPUNCTURE: CPT

## 2018-09-24 ENCOUNTER — APPOINTMENT (OUTPATIENT)
Dept: LAB | Facility: CLINIC | Age: 83
End: 2018-09-24
Payer: MEDICARE

## 2018-09-24 ENCOUNTER — ANTICOAG VISIT (OUTPATIENT)
Dept: CARDIOLOGY CLINIC | Facility: CLINIC | Age: 83
End: 2018-09-24

## 2018-09-24 DIAGNOSIS — C61 MALIGNANT NEOPLASM OF PROSTATE (HCC): Primary | ICD-10-CM

## 2018-09-24 DIAGNOSIS — I48.91 ATRIAL FIBRILLATION, UNSPECIFIED TYPE (HCC): ICD-10-CM

## 2018-09-24 DIAGNOSIS — E11.9 CONTROLLED TYPE 2 DIABETES MELLITUS WITHOUT COMPLICATION, WITHOUT LONG-TERM CURRENT USE OF INSULIN (HCC): ICD-10-CM

## 2018-09-24 DIAGNOSIS — E78.5 HYPERLIPIDEMIA, UNSPECIFIED HYPERLIPIDEMIA TYPE: ICD-10-CM

## 2018-09-24 DIAGNOSIS — M05.9 SEROPOSITIVE RHEUMATOID ARTHRITIS (HCC): ICD-10-CM

## 2018-09-24 LAB
ALBUMIN SERPL BCP-MCNC: 3.2 G/DL (ref 3.5–5)
ALP SERPL-CCNC: 60 U/L (ref 46–116)
ALT SERPL W P-5'-P-CCNC: 22 U/L (ref 12–78)
ANION GAP SERPL CALCULATED.3IONS-SCNC: 4 MMOL/L (ref 4–13)
AST SERPL W P-5'-P-CCNC: 17 U/L (ref 5–45)
BASOPHILS # BLD AUTO: 0.01 THOUSANDS/ΜL (ref 0–0.1)
BASOPHILS NFR BLD AUTO: 0 % (ref 0–1)
BILIRUB SERPL-MCNC: 0.42 MG/DL (ref 0.2–1)
BUN SERPL-MCNC: 15 MG/DL (ref 5–25)
CALCIUM SERPL-MCNC: 8.3 MG/DL (ref 8.3–10.1)
CHLORIDE SERPL-SCNC: 108 MMOL/L (ref 100–108)
CHOLEST SERPL-MCNC: 155 MG/DL (ref 50–200)
CO2 SERPL-SCNC: 29 MMOL/L (ref 21–32)
CREAT SERPL-MCNC: 0.86 MG/DL (ref 0.6–1.3)
EOSINOPHIL # BLD AUTO: 0.08 THOUSAND/ΜL (ref 0–0.61)
EOSINOPHIL NFR BLD AUTO: 2 % (ref 0–6)
ERYTHROCYTE [DISTWIDTH] IN BLOOD BY AUTOMATED COUNT: 12.5 % (ref 11.6–15.1)
EST. AVERAGE GLUCOSE BLD GHB EST-MCNC: 146 MG/DL
GFR SERPL CREATININE-BSD FRML MDRD: 76 ML/MIN/1.73SQ M
GLUCOSE P FAST SERPL-MCNC: 122 MG/DL (ref 65–99)
HBA1C MFR BLD: 6.7 % (ref 4.2–6.3)
HCT VFR BLD AUTO: 38.5 % (ref 36.5–49.3)
HDLC SERPL-MCNC: 65 MG/DL (ref 40–60)
HGB BLD-MCNC: 12.9 G/DL (ref 12–17)
IMM GRANULOCYTES # BLD AUTO: 0.02 THOUSAND/UL (ref 0–0.2)
IMM GRANULOCYTES NFR BLD AUTO: 0 % (ref 0–2)
INR PPP: 3.07 (ref 0.86–1.17)
LDLC SERPL CALC-MCNC: 78 MG/DL (ref 0–100)
LYMPHOCYTES # BLD AUTO: 1.07 THOUSANDS/ΜL (ref 0.6–4.47)
LYMPHOCYTES NFR BLD AUTO: 22 % (ref 14–44)
MCH RBC QN AUTO: 32.2 PG (ref 26.8–34.3)
MCHC RBC AUTO-ENTMCNC: 33.5 G/DL (ref 31.4–37.4)
MCV RBC AUTO: 96 FL (ref 82–98)
MONOCYTES # BLD AUTO: 0.4 THOUSAND/ΜL (ref 0.17–1.22)
MONOCYTES NFR BLD AUTO: 8 % (ref 4–12)
NEUTROPHILS # BLD AUTO: 3.27 THOUSANDS/ΜL (ref 1.85–7.62)
NEUTS SEG NFR BLD AUTO: 68 % (ref 43–75)
NRBC BLD AUTO-RTO: 0 /100 WBCS
PLATELET # BLD AUTO: 191 THOUSANDS/UL (ref 149–390)
PMV BLD AUTO: 8.8 FL (ref 8.9–12.7)
POTASSIUM SERPL-SCNC: 3.8 MMOL/L (ref 3.5–5.3)
PROT SERPL-MCNC: 6.4 G/DL (ref 6.4–8.2)
PROTHROMBIN TIME: 31.7 SECONDS (ref 11.8–14.2)
PSA SERPL-MCNC: 0.4 NG/ML (ref 0–4)
RBC # BLD AUTO: 4.01 MILLION/UL (ref 3.88–5.62)
SODIUM SERPL-SCNC: 141 MMOL/L (ref 136–145)
TRIGL SERPL-MCNC: 62 MG/DL
WBC # BLD AUTO: 4.85 THOUSAND/UL (ref 4.31–10.16)

## 2018-09-24 PROCEDURE — 85025 COMPLETE CBC W/AUTO DIFF WBC: CPT

## 2018-09-24 PROCEDURE — 80053 COMPREHEN METABOLIC PANEL: CPT

## 2018-09-24 PROCEDURE — 83036 HEMOGLOBIN GLYCOSYLATED A1C: CPT

## 2018-09-24 PROCEDURE — 36415 COLL VENOUS BLD VENIPUNCTURE: CPT

## 2018-09-24 PROCEDURE — 85610 PROTHROMBIN TIME: CPT

## 2018-09-24 PROCEDURE — 80061 LIPID PANEL: CPT

## 2018-09-24 PROCEDURE — 84153 ASSAY OF PSA TOTAL: CPT

## 2018-09-26 ENCOUNTER — OFFICE VISIT (OUTPATIENT)
Dept: UROLOGY | Facility: CLINIC | Age: 83
End: 2018-09-26
Payer: MEDICARE

## 2018-09-26 VITALS
DIASTOLIC BLOOD PRESSURE: 60 MMHG | BODY MASS INDEX: 24.77 KG/M2 | SYSTOLIC BLOOD PRESSURE: 122 MMHG | HEIGHT: 70 IN | WEIGHT: 173 LBS | HEART RATE: 62 BPM

## 2018-09-26 DIAGNOSIS — C61 MALIGNANT NEOPLASM OF PROSTATE (HCC): Primary | ICD-10-CM

## 2018-09-26 LAB
SL AMB  POCT GLUCOSE, UA: NORMAL
SL AMB LEUKOCYTE ESTERASE,UA: NORMAL
SL AMB POCT BILIRUBIN,UA: NORMAL
SL AMB POCT BLOOD,UA: NORMAL
SL AMB POCT CLARITY,UA: CLEAR
SL AMB POCT COLOR,UA: YELLOW
SL AMB POCT KETONES,UA: NORMAL
SL AMB POCT NITRITE,UA: NORMAL
SL AMB POCT PH,UA: 7
SL AMB POCT SPECIFIC GRAVITY,UA: 1
SL AMB POCT URINE PROTEIN: NORMAL
SL AMB POCT UROBILINOGEN: NORMAL

## 2018-09-26 PROCEDURE — 99213 OFFICE O/P EST LOW 20 MIN: CPT | Performed by: PHYSICIAN ASSISTANT

## 2018-09-26 PROCEDURE — 81002 URINALYSIS NONAUTO W/O SCOPE: CPT | Performed by: PHYSICIAN ASSISTANT

## 2018-09-26 NOTE — PROGRESS NOTES
UROLOGY PROGRESS NOTE   Patient Identifiers: Howie Moya (MRN [id number])  Date of Service: 9/26/2018    Subjective:     29-year-old male history of Notus 7 stage II prostate cancer  He was treated with androgen deprivation therapy and radiation treatment in 2009  PSA 0 4  Urine is clear  He has no significant outlet problems and no significant complaints  Patient has  no complaints        Objective:     VITALS:    Vitals:    09/26/18 1141   BP: 122/60   Pulse: 62           LABS:  Lab Results   Component Value Date    HGB 12 9 09/24/2018    HCT 38 5 09/24/2018    WBC 4 85 09/24/2018     09/24/2018   ]    Lab Results   Component Value Date     09/24/2018    K 3 8 09/24/2018     09/24/2018    CO2 29 09/24/2018    BUN 15 09/24/2018    CREATININE 0 86 09/24/2018    CALCIUM 8 3 09/24/2018    GLUCOSE 121 12/02/2015   ]        INPATIENT MEDS:    Current Outpatient Prescriptions:     amLODIPine (NORVASC) 5 mg tablet, Take 1 tablet by mouth daily, Disp: , Rfl:     ARTIFICIAL TEARS 0 1-0 3 % SOLN, Apply 1 drop to eye 2 (two) times a day, Disp: , Rfl:     aspirin 81 MG tablet, Take 1 tablet by mouth daily, Disp: , Rfl:     atorvastatin (LIPITOR) 10 mg tablet, TAKE 1 TABLET BY MOUTH AT  BEDTIME, Disp: 90 tablet, Rfl: 3    bisacodyl (DULCOLAX) 5 mg EC tablet, Take 1 tablet by mouth daily as needed, Disp: , Rfl:     Cholecalciferol 2000 units CAPS, Take 1 capsule by mouth daily, Disp: , Rfl:     folic acid (FOLVITE) 1 mg tablet, Take 2 tablets by mouth daily  , Disp: , Rfl:     latanoprost (XALATAN) 0 005 % ophthalmic solution, Apply 0 005 % to eye daily, Disp: , Rfl:     lisinopril (ZESTRIL) 10 mg tablet, Take 1 tablet by mouth daily, Disp: , Rfl:     methotrexate 2 5 mg tablet, Take 2 tablets by mouth every 7 days  , Disp: , Rfl:     metoprolol tartrate (LOPRESSOR) 25 mg tablet, Take 1 tablet (25 mg total) by mouth every 12 (twelve) hours, Disp: 180 tablet, Rfl: 3    Multiple Vitamin tablet, Take 1 tablet by mouth daily, Disp: , Rfl:     Omega-3 Fatty Acids (FISH OIL CONCENTRATE) 1000 MG CAPS, Take 4 capsules by mouth daily, Disp: , Rfl:     warfarin (COUMADIN) 5 mg tablet, TAKE 1 TABLET BY MOUTH  DAILY AS DIRECTED, Disp: 90 tablet, Rfl: 3    warfarin (COUMADIN) 7 5 mg tablet, TAKE 1 TABLET BY MOUTH  DAILY, Disp: 90 tablet, Rfl: 3      Physical Exam:   /60 (Patient Position: Sitting, Cuff Size: Adult)   Pulse 62   Ht 5' 9 5" (1 765 m)   Wt 78 5 kg (173 lb)   BMI 25 18 kg/m²   GEN: no acute distress    RESP: breathing comfortably with no accessory muscle use    ABD: soft, non-tender, non-distended   INCISION:    EXT: no significant peripheral edema   (Male): Penis uncircumcised, phallus normal, meatus patent  Testicles descended into scrotum bilaterally without masses nor tenderness  No inguinal hernias bilaterally  PIA: Prostate is not enlarged at 30 grams  The prostate is not boggy  The prostate is not tender  No nodules noted      RADIOLOGY:      none     Assessment:    1   Prostate cancer     Plan:   - follow-up 1 year with PSA prior to visit  -  -  -

## 2018-10-08 ENCOUNTER — APPOINTMENT (OUTPATIENT)
Dept: LAB | Facility: CLINIC | Age: 83
End: 2018-10-08
Payer: MEDICARE

## 2018-10-08 ENCOUNTER — ANTICOAG VISIT (OUTPATIENT)
Dept: CARDIOLOGY CLINIC | Facility: CLINIC | Age: 83
End: 2018-10-08

## 2018-10-08 DIAGNOSIS — I48.91 ATRIAL FIBRILLATION, UNSPECIFIED TYPE (HCC): ICD-10-CM

## 2018-10-08 LAB
INR PPP: 2.97 (ref 0.86–1.17)
PROTHROMBIN TIME: 30.9 SECONDS (ref 11.8–14.2)

## 2018-10-08 PROCEDURE — 36415 COLL VENOUS BLD VENIPUNCTURE: CPT

## 2018-10-08 PROCEDURE — 85610 PROTHROMBIN TIME: CPT

## 2018-10-11 ENCOUNTER — OFFICE VISIT (OUTPATIENT)
Dept: INTERNAL MEDICINE CLINIC | Facility: CLINIC | Age: 83
End: 2018-10-11
Payer: MEDICARE

## 2018-10-11 VITALS
HEART RATE: 51 BPM | SYSTOLIC BLOOD PRESSURE: 148 MMHG | OXYGEN SATURATION: 98 % | WEIGHT: 175 LBS | DIASTOLIC BLOOD PRESSURE: 60 MMHG | BODY MASS INDEX: 25.05 KG/M2 | HEIGHT: 70 IN

## 2018-10-11 DIAGNOSIS — E11.9 TYPE 2 DIABETES MELLITUS WITHOUT COMPLICATION, UNSPECIFIED WHETHER LONG TERM INSULIN USE (HCC): Primary | ICD-10-CM

## 2018-10-11 DIAGNOSIS — M05.9 SEROPOSITIVE RHEUMATOID ARTHRITIS (HCC): ICD-10-CM

## 2018-10-11 DIAGNOSIS — Z23 NEED FOR IMMUNIZATION AGAINST INFLUENZA: ICD-10-CM

## 2018-10-11 DIAGNOSIS — I10 HYPERTENSION, UNSPECIFIED TYPE: ICD-10-CM

## 2018-10-11 DIAGNOSIS — E78.5 DYSLIPIDEMIA: ICD-10-CM

## 2018-10-11 PROBLEM — I48.91 ATRIAL FIBRILLATION (HCC): Status: RESOLVED | Noted: 2018-01-20 | Resolved: 2018-10-11

## 2018-10-11 PROCEDURE — 99214 OFFICE O/P EST MOD 30 MIN: CPT | Performed by: INTERNAL MEDICINE

## 2018-10-11 PROCEDURE — 90662 IIV NO PRSV INCREASED AG IM: CPT

## 2018-10-11 PROCEDURE — G0008 ADMIN INFLUENZA VIRUS VAC: HCPCS

## 2018-10-11 NOTE — PROGRESS NOTES
Answers for HPI/ROS submitted by the patient on 10/10/2018   Hypertension  Onset: more than 1 year ago  Progression since onset: gradually improving  Condition status: controlled  anxiety: No  blurred vision: No  chest pain: No  headaches: No  malaise/fatigue: No  neck pain: Yes  orthopnea: No  palpitations: No  peripheral edema: No  PND: No  shortness of breath: No  sweats: No  Agents associated with hypertension: no associated agents  CAD risks: diabetes mellitus, dyslipidemia  Compliance problems: no compliance problems  Assessment/Plan   Problem List Items Addressed This Visit     Dyslipidemia    Relevant Orders    Basic metabolic panel    LDL cholesterol, direct    Hypertension    Relevant Orders    Basic metabolic panel    LDL cholesterol, direct    Seropositive rheumatoid arthritis ([facility] [address] )      Other Visit Diagnoses     Type 2 diabetes mellitus without complication, unspecified whether long term insulin use ([facility] )    -  Primary    Relevant Orders    HEMOGLOBIN A1C W/ EAG ESTIMATION    Need for immunization against influenza        Relevant Orders    influenza vaccine, [id number], high-dose, PF 0 5 mL, for patients 65 yr+ (FLUZONE HIGH-DOSE) (Completed)      Hyperlipidemia:  Lipids are optimal on atorvastatin 10 mg daily without side effects on this medicine  Total cholesterol is 155, LDL 78, HDL 65 and triglycerides are 62  Continue current medication optimal lifestyle  Hypertension: We reviewed his home blood pressure readings and ranges perfect  He is having no orthostatic symptoms as well  No change in treatment, and we did review CMP including stable normal GFR 76, normal potassium, and continue low-sodium diet, regular exercise and optimal diet  Diet-controlled type 2 diabetes:  Hemoglobin A1c remains optimal at 6 7    Would avoid metformin because of age, increased risk for acute kidney injury therefore, and underlying rheumatoid arthritis as an additional factor that may affect renal function on metformin and stability an optimal control of diabetes with diet alone  He just saw his ophthalmologist and has stable vision without glasses and no retinopathy  He did have a diabetic foot exam today  Plan is continue optimal lifestyle and monitoring including hemoglobin A1c in 3 months and fasting glucose at that time  Seropositive rheumatoid arthritis:  He will continue annual checkups with his rheumatologist in January of each year and has an upcoming appointment for early next year  CBC is normal, renal status stable, other than slight neck discomfort, most likely trivial to playing golf recently, he has no musculoskeletal complaints and is truly do remarkably well on low-dose methotrexate  Likewise, he has had no recent attacks of gout and will check uric acid level by ordering uric acid level at next visit for 3 months thereafter  Osteoarthritic symptoms are also minimal and is great joint function  In general balance and strength are superior as well as quality of life for his current age  He received a flu shot  He was encouraged to call for any questions or problems and will discuss shingles vaccine series next visit  Subjective   Patient ID: José Luis Jordan is a 80 y o  male who is here for a follow-up visit for chronic illness management  He has had no recent acute illnesses  Vitals:    10/11/18 1100   BP: 148/60   Pulse: (!) 51   SpO2: 98%     HPI   oJe Layne continues to do strikingly well, and appears and acts and truly physiologically is much younger than his stated age of 80    He is still active academically as retired , walks 8 to 72388 steps a day, plays golf, lifts weights, has excellent balance and strength, had some mild recent muscular discomfort after playing golf but otherwise has no complaints in this regard, has no palpitations or lightheadedness with a history of paroxysmal atrial fibrillation, Coumadin management as per Cardiology with no clinical bleeding  He has had no symptoms of higher low blood sugars and recent labs as discussed in assessment show excellent glycemic control without medication  His lifestyle continues to be optimal in this regard  Home blood pressure readings are optimal without orthostatic lightheadedness or recent changes in medication needed to control blood pressure  He has had no flares of rheumatoid arthritis in years, and tolerates low-dose methotrexate well which was tapered down to very low-dose currently with good stability of symptom control  He will be seeing his rheumatologist for his annual visit in January  Lipids are likewise optimally controlled  We reviewed his recent labs and he has no side effects on atorvastatin  The following portions of the patient's history were reviewed and updated as appropriate: allergies, current medications, past family history, past medical history, past social history, past surgical history and problem list     Review of Systems excellent exercise tolerance, no chest pain or shortness of breath, no palpitations lightheadedness or syncope  No clinical bleeding  No decline in memory, no alteration of mood  Objective   Physical Exam   Cardiovascular: Pulses are no weak pulses  Pulses:       Dorsalis pedis pulses are 2+ on the right side, and 2+ on the left side  Posterior tibial pulses are 2+ on the right side, and 2+ on the left side  This 80-year-old gentleman appears much younger than his stated age with excellent strength and balance, extremely sharp from a cognitive standpoint, mood optimal   Pulse regular  No JVD  No carotid bruits in carotid pulses normal   Lungs clear  Cardiac:  Regular rate rhythm, normal S1 and S2, no murmur, no S4 or S3, PMI fifth intercostal space midclavicular line  Peripheral circulation intact  There is trace edema of lower legs without phlebitic findings  Joints without active joint inflammation    There are no tophi  There is no rash  Patient's shoes and socks removed  Right Foot/Ankle   Right Foot Inspection  Skin Exam: skin normal and skin intact no dry skin, no warmth, no callus, no erythema, no maceration, no abnormal color, no pre-ulcer, no ulcer and no callus                          Toe Exam: ROM and strength within normal limits  Sensory     Proprioception: intact   Monofilament testing: intact  Vascular  Capillary refills: < 3 seconds  The right DP pulse is 2+  The right PT pulse is 2+  Left Foot/Ankle  Left Foot Inspection  Skin Exam: skin normal and skin intactno dry skin, no warmth, no erythema, no maceration, normal color, no pre-ulcer, no ulcer and no callus                         Toe Exam: ROM and strength within normal limits                   Sensory     Proprioception: intact  Monofilament: intact  Vascular  Capillary refills: < 3 seconds  The left DP pulse is 2+  The left PT pulse is 2+  Assign Risk Category:  Deformity present; No loss of protective sensation;  No weak pulses       Risk: 0        Patient Active Problem List   Diagnosis    Benign prostatic hyperplasia    Chronic constipation    Diabetes mellitus type II, controlled ([facility] [address] )    Diverticulosis    Dyslipidemia    History of prostate cancer    Hypertension    Left bundle-branch block    Paroxysmal atrial fibrillation (HCC)    Seropositive rheumatoid arthritis (HCC)    Spondylosis of cervical region without myelopathy or radiculopathy    Venous insufficiency     Current Outpatient Prescriptions:     amLODIPine (NORVASC) 5 mg tablet, Take 1 tablet by mouth daily, Disp: , Rfl:     ARTIFICIAL TEARS 0 1-0 3 % SOLN, Apply 1 drop to eye 2 (two) times a day, Disp: , Rfl:     aspirin 81 MG tablet, Take 1 tablet by mouth daily, Disp: , Rfl:     atorvastatin (LIPITOR) 10 mg tablet, TAKE 1 TABLET BY MOUTH AT  BEDTIME, Disp: 90 tablet, Rfl: 3    bisacodyl (DULCOLAX) 5 mg EC tablet, Take 1 tablet by mouth daily as needed, Disp: , Rfl:     Cholecalciferol 2000 units CAPS, Take 1 capsule by mouth daily, Disp: , Rfl:     folic acid (FOLVITE) 1 mg tablet, Take 2 tablets by mouth daily  , Disp: , Rfl:     latanoprost (XALATAN) 0 005 % ophthalmic solution, Apply 0 005 % to eye daily, Disp: , Rfl:     lisinopril (ZESTRIL) 10 mg tablet, Take 1 tablet by mouth daily, Disp: , Rfl:     methotrexate 2 5 mg tablet, Take 2 tablets by mouth every 7 days  , Disp: , Rfl:     metoprolol tartrate (LOPRESSOR) 25 mg tablet, Take 1 tablet (25 mg total) by mouth every 12 (twelve) hours, Disp: 180 tablet, Rfl: 3    Multiple Vitamin tablet, Take 1 tablet by mouth daily, Disp: , Rfl:     Omega-3 Fatty Acids (FISH OIL CONCENTRATE) 1000 MG CAPS, Take 4 capsules by mouth daily, Disp: , Rfl:     warfarin (COUMADIN) 5 mg tablet, TAKE 1 TABLET BY MOUTH  DAILY AS DIRECTED, Disp: 90 tablet, Rfl: 3    warfarin (COUMADIN) 7 5 mg tablet, TAKE 1 TABLET BY MOUTH  DAILY, Disp: 90 tablet, Rfl: 3

## 2018-10-11 NOTE — PATIENT INSTRUCTIONS
Please have your lab work performed in early January and your next visit will be in mid January  Please continue your excellent lifestyle  Please call between visits for any questions or problems

## 2018-10-29 ENCOUNTER — APPOINTMENT (OUTPATIENT)
Dept: LAB | Facility: CLINIC | Age: 83
End: 2018-10-29
Payer: MEDICARE

## 2018-10-29 DIAGNOSIS — I48.0 PAROXYSMAL ATRIAL FIBRILLATION (HCC): ICD-10-CM

## 2018-10-29 LAB
INR PPP: 1.18 (ref 0.86–1.17)
PROTHROMBIN TIME: 15.1 SECONDS (ref 11.8–14.2)

## 2018-10-29 PROCEDURE — 36415 COLL VENOUS BLD VENIPUNCTURE: CPT

## 2018-10-29 PROCEDURE — 85610 PROTHROMBIN TIME: CPT

## 2018-10-31 ENCOUNTER — ANTICOAG VISIT (OUTPATIENT)
Dept: CARDIOLOGY CLINIC | Facility: CLINIC | Age: 83
End: 2018-10-31

## 2018-11-14 ENCOUNTER — ANTICOAG VISIT (OUTPATIENT)
Dept: CARDIOLOGY CLINIC | Facility: CLINIC | Age: 83
End: 2018-11-14

## 2018-11-14 ENCOUNTER — APPOINTMENT (OUTPATIENT)
Dept: LAB | Facility: CLINIC | Age: 83
End: 2018-11-14
Payer: MEDICARE

## 2018-11-14 DIAGNOSIS — I48.91 ATRIAL FIBRILLATION, UNSPECIFIED TYPE (HCC): ICD-10-CM

## 2018-11-14 LAB
INR PPP: 1.99 (ref 0.86–1.17)
PROTHROMBIN TIME: 22.7 SECONDS (ref 11.8–14.2)

## 2018-11-14 PROCEDURE — 36415 COLL VENOUS BLD VENIPUNCTURE: CPT

## 2018-11-14 PROCEDURE — 85610 PROTHROMBIN TIME: CPT

## 2018-11-29 ENCOUNTER — OFFICE VISIT (OUTPATIENT)
Dept: CARDIOLOGY CLINIC | Facility: CLINIC | Age: 83
End: 2018-11-29
Payer: MEDICARE

## 2018-11-29 VITALS
BODY MASS INDEX: 25.09 KG/M2 | HEART RATE: 55 BPM | SYSTOLIC BLOOD PRESSURE: 140 MMHG | DIASTOLIC BLOOD PRESSURE: 68 MMHG | WEIGHT: 175.3 LBS | HEIGHT: 70 IN

## 2018-11-29 DIAGNOSIS — I10 ESSENTIAL HYPERTENSION: ICD-10-CM

## 2018-11-29 DIAGNOSIS — I44.7 LEFT BUNDLE-BRANCH BLOCK: ICD-10-CM

## 2018-11-29 DIAGNOSIS — I48.0 PAROXYSMAL ATRIAL FIBRILLATION (HCC): Primary | ICD-10-CM

## 2018-11-29 DIAGNOSIS — E78.5 DYSLIPIDEMIA: ICD-10-CM

## 2018-11-29 PROCEDURE — 93000 ELECTROCARDIOGRAM COMPLETE: CPT | Performed by: INTERNAL MEDICINE

## 2018-11-29 PROCEDURE — 99214 OFFICE O/P EST MOD 30 MIN: CPT | Performed by: INTERNAL MEDICINE

## 2018-11-29 NOTE — PROGRESS NOTES
Cardiology Follow Up    José Luis Jordan  1/19/1927  [id number]  [address] [facility]  [address] [place] [address] [address]    1  Paroxysmal atrial fibrillation (HCC)  POCT ECG   2  Left bundle-branch block     3  Essential hypertension     4  Dyslipidemia         Discussion/Summary:  He has been doing quite well from a cardiac standpoint  He was battling chronic constipation we tried to discontinue statin for a couple weeks which did not help and took him off the Cardizem changing to metoprolol which again did not help this is likely non medication related  Blood pressures been stable  Lipids are stable he would like to continue with conservative medical approach I will not order any testing unless he develops new symptoms       Interval History:   61-year-old gentleman history of paroxysmal atrial fibrillation currently maintaining sinus rhythm on anticoagulation, hypertension, hyperlipidemia, incomplete left bundle branch block presents for routine scheduled follow-up appointment  Overall he has been doing well he walks 18 holes on the golf course  Even in the winter he is out playing golf if there is no snow on the course  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  There has been no exertional chest pain, dyspnea, lightheadedness, dizziness, or syncope  He has had no adverse bleeding on anticoagulation    Problem List     Atrial fibrillation ([facility] [address] ) [I48 [id number]]    Arthritis    Overview Signed 3/25/2018 11:55 AM by Jim Thakkar MD     Description: DJD         Benign prostatic hyperplasia    Chronic constipation    Diabetes mellitus type II, controlled ([facility] [address] )    Diverticulosis    Overview Signed 3/25/2018 11:55 AM by Jim Thakkar MD     Description: C BLEEDING         Dyslipidemia    History of prostate cancer    Hypertension    Impaired fasting glucose    Left bundle-branch block    Overview Signed 3/25/2018 11:55 AM by Sukhjinder Cody MD     Description: CHRONIC         Paroxysmal atrial fibrillation (HCC)    Rheumatoid arthritis (HCC)    Seropositive rheumatoid arthritis ([place] [address] )    Spondylosis of cervical region without myelopathy or radiculopathy    Venous insufficiency        Past Medical History:   Diagnosis Date    A-fib ([place] [address] )     Basal cell carcinoma     10/9/15    Malignant neoplasm of prostate ([place] [address] )     10/9/15    Rheumatoid arthritis ([place] [address] )      Social History     Social History    Marital status: Single     Spouse name: N/A    Number of children: N/A    Years of education: N/A     Occupational History    Teaching      Social History Main Topics    Smoking status: Former Smoker    Smokeless tobacco: Former User    Alcohol use Yes      Comment: wine    Drug use: No    Sexual activity: Not on file     Other Topics Concern    Not on file     Social History Narrative    No narrative on file      Family History   Problem Relation Age of Onset    Diabetes Mother     Lung cancer Mother     Heart disease Father     Stroke Father         syndrome     Past Surgical History:   Procedure Laterality Date    CATARACT EXTRACTION      2000    CORONARY ANGIOPLASTY WITH STENT PLACEMENT      7[place]    HEMORRHOID SURGERY      HERNIA REPAIR      PROSTATE SURGERY         Current Outpatient Prescriptions:     amLODIPine (NORVASC) 5 mg tablet, Take 1 tablet by mouth daily, Disp: , Rfl:     ARTIFICIAL TEARS 0 1-0 3 % SOLN, Apply 1 drop to eye 2 (two) times a day, Disp: , Rfl:     aspirin 81 MG tablet, Take 1 tablet by mouth daily, Disp: , Rfl:     atorvastatin (LIPITOR) 10 mg tablet, TAKE 1 TABLET BY MOUTH AT  BEDTIME, Disp: 90 tablet, Rfl: 3    bisacodyl (DULCOLAX) 5 mg EC tablet, Take 1 tablet by mouth daily as needed, Disp: , Rfl:     Cholecalciferol 2000 units CAPS, Take 1 capsule by mouth daily, Disp: , Rfl:     folic acid (FOLVITE) 1 mg tablet, Take 2 tablets by mouth daily  , Disp: , Rfl:    latanoprost (XALATAN) 0 005 % ophthalmic solution, Apply 0 005 % to eye daily, Disp: , Rfl:     lisinopril (ZESTRIL) 10 mg tablet, Take 1 tablet by mouth daily, Disp: , Rfl:     methotrexate 2 5 mg tablet, Take 2 tablets by mouth every 7 days  , Disp: , Rfl:     metoprolol tartrate (LOPRESSOR) 25 mg tablet, Take 1 tablet (25 mg total) by mouth every 12 (twelve) hours, Disp: 180 tablet, Rfl: 3    Multiple Vitamin tablet, Take 1 tablet by mouth daily, Disp: , Rfl:     Omega-3 Fatty Acids (FISH OIL CONCENTRATE) 1000 MG CAPS, Take 4 capsules by mouth daily, Disp: , Rfl:     warfarin (COUMADIN) 5 mg tablet, TAKE 1 TABLET BY MOUTH  DAILY AS DIRECTED, Disp: 90 tablet, Rfl: 3    warfarin (COUMADIN) 7 5 mg tablet, TAKE 1 TABLET BY MOUTH  DAILY, Disp: 90 tablet, Rfl: 3  No Known Allergies    Labs:     Chemistry        Component Value Date/Time     12/02/2015 0819    K 3 8 09/24/2018 0819    K 3 8 12/02/2015 0819     09/24/2018 0819     (H) 12/02/2015 0819    CO2 29 09/24/2018 0819    CO2 26 12/02/2015 0819    BUN 15 09/24/2018 0819    BUN 17 12/02/2015 0819    CREATININE 0 86 09/24/2018 0819    CREATININE 0 82 12/02/2015 0819        Component Value Date/Time    CALCIUM 8 3 09/24/2018 0819    CALCIUM 8 4 12/02/2015 0819    ALKPHOS 60 09/24/2018 0819    ALKPHOS 68 12/02/2015 0819    AST 17 09/24/2018 0819    AST 14 12/02/2015 0819    ALT 22 09/24/2018 0819    ALT 35 12/02/2015 0819    BILITOT 0 49 12/02/2015 0819            No results found for: CHOL  Lab Results   Component Value Date    HDL 65 (H) 09/24/2018    HDL 70 (H) 06/22/2018    HDL 75 (H) 04/04/2017     Lab Results   Component Value Date    LDLCALC 78 09/24/2018    LDLCALC 65 06/22/2018    LDLCALC 52 04/04/2017     Lab Results   Component Value Date    TRIG 62 09/24/2018    TRIG 40 06/22/2018    TRIG 37 04/04/2017     No results found for: CHOLHDL    Imaging: No results found      ECG:    Sinus rhythm 1st degree AV block nonspecific interventricular conduction delay  ROS    Vitals:    11/29/18 1034   BP: 140/68   Pulse: 55     Vitals:    11/29/18 1034   Weight: 79 5 kg (175 lb 4 8 oz)     Height: 5' 10" (177 8 cm)   Body mass index is 25 15 kg/m²      Physical Exam:   General appearance:  Appears stated age, alert, well appearing and in no distress  HEENT:  PERRLA, EOMI, no scleral icterus, no conjunctival pallor  NECK:  Supple, No elevated JVP, no thyromegaly, no carotid bruits  HEART:  Regular rate and rhythm, normal S1/S2, no S3/S4, no murmur or rub  LUNGS:  Clear to auscultation bilaterally, no wheezes rales or rhonchi  ABDOMEN:  Soft, non-tender, positive bowel sounds, no rebound or guarding, no organomegaly   EXTREMITIES:  No edema, normal range of motion  VASCULAR:  Normal pedal pulses, good pulse volume   SKIN: No lesions or rashes on exposed skin  NEURO:  CN II-XII intact, no focal deficits

## 2018-12-05 ENCOUNTER — APPOINTMENT (OUTPATIENT)
Dept: LAB | Facility: CLINIC | Age: 83
End: 2018-12-05
Payer: MEDICARE

## 2018-12-05 ENCOUNTER — ANTICOAG VISIT (OUTPATIENT)
Dept: CARDIOLOGY CLINIC | Facility: CLINIC | Age: 83
End: 2018-12-05

## 2018-12-05 DIAGNOSIS — I48.91 ATRIAL FIBRILLATION, UNSPECIFIED TYPE (HCC): ICD-10-CM

## 2018-12-05 LAB
INR PPP: 2.65 (ref 0.86–1.17)
PROTHROMBIN TIME: 28.3 SECONDS (ref 11.8–14.2)

## 2018-12-05 PROCEDURE — 85610 PROTHROMBIN TIME: CPT

## 2018-12-05 PROCEDURE — 36415 COLL VENOUS BLD VENIPUNCTURE: CPT

## 2018-12-26 DIAGNOSIS — I48.91 ATRIAL FIBRILLATION, UNSPECIFIED TYPE (HCC): Primary | ICD-10-CM

## 2018-12-27 ENCOUNTER — ANTICOAG VISIT (OUTPATIENT)
Dept: CARDIOLOGY CLINIC | Facility: CLINIC | Age: 83
End: 2018-12-27

## 2018-12-27 ENCOUNTER — LAB (OUTPATIENT)
Dept: LAB | Facility: CLINIC | Age: 83
End: 2018-12-27
Payer: MEDICARE

## 2018-12-27 DIAGNOSIS — I10 HYPERTENSION, UNSPECIFIED TYPE: ICD-10-CM

## 2018-12-27 DIAGNOSIS — I48.91 ATRIAL FIBRILLATION, UNSPECIFIED TYPE (HCC): ICD-10-CM

## 2018-12-27 DIAGNOSIS — E11.9 TYPE 2 DIABETES MELLITUS WITHOUT COMPLICATION, UNSPECIFIED WHETHER LONG TERM INSULIN USE (HCC): ICD-10-CM

## 2018-12-27 DIAGNOSIS — E78.5 DYSLIPIDEMIA: ICD-10-CM

## 2018-12-27 LAB
ANION GAP SERPL CALCULATED.3IONS-SCNC: 6 MMOL/L (ref 4–13)
BUN SERPL-MCNC: 15 MG/DL (ref 5–25)
CALCIUM SERPL-MCNC: 8.1 MG/DL (ref 8.3–10.1)
CHLORIDE SERPL-SCNC: 106 MMOL/L (ref 100–108)
CO2 SERPL-SCNC: 28 MMOL/L (ref 21–32)
CREAT SERPL-MCNC: 0.87 MG/DL (ref 0.6–1.3)
EST. AVERAGE GLUCOSE BLD GHB EST-MCNC: 154 MG/DL
GFR SERPL CREATININE-BSD FRML MDRD: 75 ML/MIN/1.73SQ M
GLUCOSE P FAST SERPL-MCNC: 129 MG/DL (ref 65–99)
HBA1C MFR BLD: 7 % (ref 4.2–6.3)
INR PPP: 2.95 (ref 0.86–1.17)
LDLC SERPL DIRECT ASSAY-MCNC: 75 MG/DL (ref 0–100)
POTASSIUM SERPL-SCNC: 3.8 MMOL/L (ref 3.5–5.3)
PROTHROMBIN TIME: 30.8 SECONDS (ref 11.8–14.2)
SODIUM SERPL-SCNC: 140 MMOL/L (ref 136–145)

## 2018-12-27 PROCEDURE — 36415 COLL VENOUS BLD VENIPUNCTURE: CPT

## 2018-12-27 PROCEDURE — 85610 PROTHROMBIN TIME: CPT

## 2018-12-27 PROCEDURE — 83036 HEMOGLOBIN GLYCOSYLATED A1C: CPT

## 2018-12-27 PROCEDURE — 80048 BASIC METABOLIC PNL TOTAL CA: CPT

## 2018-12-27 PROCEDURE — 83721 ASSAY OF BLOOD LIPOPROTEIN: CPT

## 2019-01-11 ENCOUNTER — OFFICE VISIT (OUTPATIENT)
Dept: INTERNAL MEDICINE CLINIC | Facility: CLINIC | Age: 84
End: 2019-01-11
Payer: MEDICARE

## 2019-01-11 VITALS
DIASTOLIC BLOOD PRESSURE: 70 MMHG | HEART RATE: 60 BPM | SYSTOLIC BLOOD PRESSURE: 134 MMHG | WEIGHT: 172.8 LBS | HEIGHT: 70 IN | OXYGEN SATURATION: 97 % | BODY MASS INDEX: 24.74 KG/M2 | TEMPERATURE: 98.4 F

## 2019-01-11 DIAGNOSIS — I10 HYPERTENSION, UNSPECIFIED TYPE: Primary | ICD-10-CM

## 2019-01-11 DIAGNOSIS — M05.9 SEROPOSITIVE RHEUMATOID ARTHRITIS (HCC): ICD-10-CM

## 2019-01-11 DIAGNOSIS — M15.9 PRIMARY OSTEOARTHRITIS INVOLVING MULTIPLE JOINTS: ICD-10-CM

## 2019-01-11 DIAGNOSIS — E11.9 TYPE 2 DIABETES MELLITUS WITHOUT COMPLICATION, UNSPECIFIED WHETHER LONG TERM INSULIN USE (HCC): ICD-10-CM

## 2019-01-11 PROCEDURE — 99214 OFFICE O/P EST MOD 30 MIN: CPT | Performed by: INTERNAL MEDICINE

## 2019-01-11 NOTE — PROGRESS NOTES
Assessment/Plan:     Diagnoses and all orders for this visit:    Hypertension, unspecified type  -     Basic metabolic panel; Future  -     HEMOGLOBIN A1C W/ EAG ESTIMATION; Future  -     LDL cholesterol, direct; Future  -     Uric acid; Future    Type 2 diabetes mellitus without complication, unspecified whether long term insulin use (HCC)  -     HEMOGLOBIN A1C W/ EAG ESTIMATION; Future  -     LDL cholesterol, direct; Future  -     Uric acid; Future    Primary osteoarthritis involving multiple joints    Seropositive rheumatoid arthritis ([facility] [address] )        Plan:  Lab work will be drawn in early April head of next visit in 3 months  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 his extremely optimal lifestyle, and remains truly remarkable man at the age of 80, actively pursuing graduate level academic study, actively publishing, traveling, going for long walks every morning, playing golf including walking the entire course into the winter  He will follow up with Rheumatology in April as well  He remains asymptomatic regarding rheumatoid arthritis on low-dose methotrexate and fish oil along  Osteoarthritis symptoms are mainly in the fingers and are causing minimal functional difficulty, not interfering with any essential activities and is not causing any pain  Diabetes is well controlled with hemoglobin A1c of 7 0 on December 27th, and BMP is stable with GFR normal at 75 with glucose of 129 and LDL is controlled 75  Subjective:      Patient ID: Jerry Santiago is a 80 y o  male here for his 3 month checkup  He is feeling well with no recent problems since last visit  HPI   He is truly remarkable manage any age little own age of 80  He speaks several languages, plays golf outdoors even in the winter time without difficulty including no need to even take a nap after playing 18 holes of walking the entire course, 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 widely    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  We reviewed his December 27th labs and there were optimal as described above  He has had no recent illnesses  There has been no recent change in medication for rheumatoid arthritis as prescribed by his rheumatologist   Other than occasional stiffness in his finger joints due to osteoarthritis, and is shoulders, after playing golf from osteoarthritis, he has no rheumatologic complaints  Blood pressure repeated was normal so hypertension is well controlled and he brought in his home blood pressure readings which seem optimal   He has no orthostatic lightheadedness, no chest pain or shortness of breath  He reports minimal lower leg edema, follows a low-sodium diet and has had no phlebitis symptoms  His peripheral circulation remains intact and he still has some mild small fiber sensory neuropathy symptoms many in the left lower leg that are stable over time  There is no gait disturbance      The following portions of the patient's history were reviewed and updated as appropriate: allergies, current medications, past family history, past medical history, past social history, past surgical history and problem list     Review of Systems      Objective:      /70 (BP Location: Left arm, Patient Position: Sitting, Cuff Size: Standard)   Pulse 60   Temp 98 4 °F (36 9 °C) (Tympanic)   Ht 5' 10" (1 778 m)   Wt 78 4 kg (172 lb 12 8 oz)   SpO2 97%   BMI 24 79 kg/m²      Wt Readings from Last 3 Encounters:   01/11/19 78 4 kg (172 lb 12 8 oz)   11/29/18 79 5 kg (175 lb 4 8 oz)   10/11/18 79 4 kg (175 lb)     Temp Readings from Last 3 Encounters:   01/11/19 98 4 °F (36 9 °C) (Tympanic)   01/05/17 97 5 °F (36 4 °C) (Oral)   09/19/16 98 °F (36 7 °C)     BP Readings from Last 3 Encounters:   01/11/19 134/70   11/29/18 140/68   10/11/18 148/60     Pulse Readings from Last 3 Encounters:   01/11/19 60   11/29/18 55   10/11/18 (!) 51        Physical Exam    This 80year-old gentleman appears much younger than his stated age, with no cognitive deficits, extremely intelligent, well spoken, well dressed and well groomed, excellent strength and balance, in good spirits  There is no acute joint inflammation noted  No JVD  Lungs clear and cardiac exam is normal on auscultation other than splitting of S2  The peripheral circulation is intact including 2/2 posterior tibial and dorsalis pedis pulses with excellent skin integrity of lower legs with some slight decreased sensation to light touch in the lower portions of the lower legs  Gait is stable  Strength, muscle mass and balance are superior for his age  There is trace edema of lower legs  Skin integrity is excellent    Patient Active Problem List   Diagnosis    Benign prostatic hyperplasia    Chronic constipation    Diabetes mellitus type II, controlled ([facility] [address] )    Diverticulosis    Dyslipidemia    History of prostate cancer    Hypertension    Left bundle-branch block    Paroxysmal atrial fibrillation (HCC)    Seropositive rheumatoid arthritis (HCC)    Spondylosis of cervical region without myelopathy or radiculopathy    Venous insufficiency    Primary osteoarthritis involving multiple joints       Current Outpatient Prescriptions on File Prior to Visit   Medication Sig Dispense Refill    amLODIPine (NORVASC) 5 mg tablet Take 1 tablet by mouth daily      ARTIFICIAL TEARS 0 1-0 3 % SOLN Apply 1 drop to eye 2 (two) times a day      aspirin 81 MG tablet Take 1 tablet by mouth daily      atorvastatin (LIPITOR) 10 mg tablet TAKE 1 TABLET BY MOUTH AT  BEDTIME 90 tablet 3    bisacodyl (DULCOLAX) 5 mg EC tablet Take 1 tablet by mouth daily as needed      Cholecalciferol 2000 units CAPS Take 1 capsule by mouth daily      folic acid (FOLVITE) 1 mg tablet Take 2 tablets by mouth daily        latanoprost (XALATAN) 0 005 % ophthalmic solution Apply 0 005 % to eye daily      lisinopril (ZESTRIL) 10 mg tablet Take 1 tablet by mouth daily      methotrexate 2 5 mg tablet Take 2 tablets by mouth every 7 days        metoprolol tartrate (LOPRESSOR) 25 mg tablet Take 1 tablet (25 mg total) by mouth every 12 (twelve) hours 180 tablet 3    Multiple Vitamin tablet Take 1 tablet by mouth daily      Omega-3 Fatty Acids (FISH OIL CONCENTRATE) 1000 MG CAPS Take 4 capsules by mouth daily      warfarin (COUMADIN) 5 mg tablet TAKE 1 TABLET BY MOUTH  DAILY AS DIRECTED 90 tablet 3    warfarin (COUMADIN) 7 5 mg tablet TAKE 1 TABLET BY MOUTH  DAILY 90 tablet 3     No current facility-administered medications on file prior to visit  Statement Selected

## 2019-01-28 ENCOUNTER — ANTICOAG VISIT (OUTPATIENT)
Dept: CARDIOLOGY CLINIC | Facility: CLINIC | Age: 84
End: 2019-01-28

## 2019-01-28 ENCOUNTER — APPOINTMENT (OUTPATIENT)
Dept: LAB | Facility: CLINIC | Age: 84
End: 2019-01-28
Payer: MEDICARE

## 2019-03-01 ENCOUNTER — APPOINTMENT (OUTPATIENT)
Dept: LAB | Facility: CLINIC | Age: 84
End: 2019-03-01
Payer: MEDICARE

## 2019-03-01 ENCOUNTER — ANTICOAG VISIT (OUTPATIENT)
Dept: CARDIOLOGY CLINIC | Facility: CLINIC | Age: 84
End: 2019-03-01

## 2019-03-01 LAB
INR PPP: 2.59 (ref 0.86–1.17)
PROTHROMBIN TIME: 27.8 SECONDS (ref 11.8–14.2)

## 2019-03-01 PROCEDURE — 85610 PROTHROMBIN TIME: CPT

## 2019-03-01 PROCEDURE — 36415 COLL VENOUS BLD VENIPUNCTURE: CPT

## 2019-04-01 ENCOUNTER — APPOINTMENT (OUTPATIENT)
Dept: LAB | Facility: CLINIC | Age: 84
End: 2019-04-01
Payer: MEDICARE

## 2019-04-01 ENCOUNTER — ANTICOAG VISIT (OUTPATIENT)
Dept: CARDIOLOGY CLINIC | Facility: CLINIC | Age: 84
End: 2019-04-01

## 2019-04-17 ENCOUNTER — APPOINTMENT (OUTPATIENT)
Dept: LAB | Facility: CLINIC | Age: 84
End: 2019-04-17
Payer: MEDICARE

## 2019-04-17 ENCOUNTER — ANTICOAG VISIT (OUTPATIENT)
Dept: CARDIOLOGY CLINIC | Facility: CLINIC | Age: 84
End: 2019-04-17

## 2019-04-17 ENCOUNTER — TRANSCRIBE ORDERS (OUTPATIENT)
Dept: ADMINISTRATIVE | Facility: HOSPITAL | Age: 84
End: 2019-04-17

## 2019-04-17 DIAGNOSIS — I10 HYPERTENSION, UNSPECIFIED TYPE: ICD-10-CM

## 2019-04-17 DIAGNOSIS — M05.9 SEROPOSITIVE RHEUMATOID ARTHRITIS (HCC): ICD-10-CM

## 2019-04-17 DIAGNOSIS — M05.9 SEROPOSITIVE RHEUMATOID ARTHRITIS (HCC): Primary | ICD-10-CM

## 2019-04-17 DIAGNOSIS — E11.9 TYPE 2 DIABETES MELLITUS WITHOUT COMPLICATION, UNSPECIFIED WHETHER LONG TERM INSULIN USE (HCC): ICD-10-CM

## 2019-04-17 LAB
ALBUMIN SERPL BCP-MCNC: 3.2 G/DL (ref 3.5–5)
ALP SERPL-CCNC: 66 U/L (ref 46–116)
ALT SERPL W P-5'-P-CCNC: 19 U/L (ref 12–78)
ANION GAP SERPL CALCULATED.3IONS-SCNC: 4 MMOL/L (ref 4–13)
AST SERPL W P-5'-P-CCNC: 13 U/L (ref 5–45)
BASOPHILS # BLD AUTO: 0.02 THOUSANDS/ΜL (ref 0–0.1)
BASOPHILS NFR BLD AUTO: 0 % (ref 0–1)
BILIRUB SERPL-MCNC: 0.43 MG/DL (ref 0.2–1)
BUN SERPL-MCNC: 19 MG/DL (ref 5–25)
CALCIUM SERPL-MCNC: 8.2 MG/DL (ref 8.3–10.1)
CHLORIDE SERPL-SCNC: 109 MMOL/L (ref 100–108)
CO2 SERPL-SCNC: 28 MMOL/L (ref 21–32)
CREAT SERPL-MCNC: 0.89 MG/DL (ref 0.6–1.3)
EOSINOPHIL # BLD AUTO: 0.05 THOUSAND/ΜL (ref 0–0.61)
EOSINOPHIL NFR BLD AUTO: 1 % (ref 0–6)
ERYTHROCYTE [DISTWIDTH] IN BLOOD BY AUTOMATED COUNT: 12.5 % (ref 11.6–15.1)
EST. AVERAGE GLUCOSE BLD GHB EST-MCNC: 148 MG/DL
GFR SERPL CREATININE-BSD FRML MDRD: 74 ML/MIN/1.73SQ M
GLUCOSE SERPL-MCNC: 135 MG/DL (ref 65–140)
HBA1C MFR BLD: 6.8 % (ref 4.2–6.3)
HCT VFR BLD AUTO: 38.6 % (ref 36.5–49.3)
HGB BLD-MCNC: 13 G/DL (ref 12–17)
IMM GRANULOCYTES # BLD AUTO: 0.03 THOUSAND/UL (ref 0–0.2)
IMM GRANULOCYTES NFR BLD AUTO: 1 % (ref 0–2)
LDLC SERPL DIRECT ASSAY-MCNC: 69 MG/DL (ref 0–100)
LYMPHOCYTES # BLD AUTO: 1.01 THOUSANDS/ΜL (ref 0.6–4.47)
LYMPHOCYTES NFR BLD AUTO: 20 % (ref 14–44)
MCH RBC QN AUTO: 32.3 PG (ref 26.8–34.3)
MCHC RBC AUTO-ENTMCNC: 33.7 G/DL (ref 31.4–37.4)
MCV RBC AUTO: 96 FL (ref 82–98)
MONOCYTES # BLD AUTO: 0.4 THOUSAND/ΜL (ref 0.17–1.22)
MONOCYTES NFR BLD AUTO: 8 % (ref 4–12)
NEUTROPHILS # BLD AUTO: 3.52 THOUSANDS/ΜL (ref 1.85–7.62)
NEUTS SEG NFR BLD AUTO: 70 % (ref 43–75)
NRBC BLD AUTO-RTO: 0 /100 WBCS
PLATELET # BLD AUTO: 207 THOUSANDS/UL (ref 149–390)
PMV BLD AUTO: 9.3 FL (ref 8.9–12.7)
POTASSIUM SERPL-SCNC: 4 MMOL/L (ref 3.5–5.3)
PROT SERPL-MCNC: 6.5 G/DL (ref 6.4–8.2)
RBC # BLD AUTO: 4.03 MILLION/UL (ref 3.88–5.62)
SODIUM SERPL-SCNC: 141 MMOL/L (ref 136–145)
URATE SERPL-MCNC: 4.9 MG/DL (ref 4.2–8)
WBC # BLD AUTO: 5.03 THOUSAND/UL (ref 4.31–10.16)

## 2019-04-17 PROCEDURE — 85025 COMPLETE CBC W/AUTO DIFF WBC: CPT

## 2019-04-17 PROCEDURE — 83721 ASSAY OF BLOOD LIPOPROTEIN: CPT

## 2019-04-17 PROCEDURE — 80053 COMPREHEN METABOLIC PANEL: CPT

## 2019-04-17 PROCEDURE — 84550 ASSAY OF BLOOD/URIC ACID: CPT

## 2019-04-17 PROCEDURE — 83036 HEMOGLOBIN GLYCOSYLATED A1C: CPT

## 2019-04-23 ENCOUNTER — OFFICE VISIT (OUTPATIENT)
Dept: INTERNAL MEDICINE CLINIC | Facility: CLINIC | Age: 84
End: 2019-04-23
Payer: MEDICARE

## 2019-04-23 VITALS
HEIGHT: 70 IN | DIASTOLIC BLOOD PRESSURE: 64 MMHG | WEIGHT: 173.6 LBS | BODY MASS INDEX: 24.85 KG/M2 | TEMPERATURE: 98.9 F | HEART RATE: 65 BPM | OXYGEN SATURATION: 98 % | SYSTOLIC BLOOD PRESSURE: 130 MMHG

## 2019-04-23 DIAGNOSIS — E11.9 TYPE 2 DIABETES MELLITUS WITHOUT COMPLICATION, UNSPECIFIED WHETHER LONG TERM INSULIN USE (HCC): Primary | ICD-10-CM

## 2019-04-23 DIAGNOSIS — I48.0 PAROXYSMAL ATRIAL FIBRILLATION (HCC): ICD-10-CM

## 2019-04-23 DIAGNOSIS — C61 MALIGNANT NEOPLASM OF PROSTATE (HCC): ICD-10-CM

## 2019-04-23 DIAGNOSIS — M05.9 SEROPOSITIVE RHEUMATOID ARTHRITIS (HCC): ICD-10-CM

## 2019-04-23 DIAGNOSIS — E78.5 DYSLIPIDEMIA: ICD-10-CM

## 2019-04-23 PROBLEM — M1A.9XX0 CHRONIC GOUT INVOLVING TOE OF RIGHT FOOT WITHOUT TOPHUS: Status: ACTIVE | Noted: 2019-04-23

## 2019-04-23 PROCEDURE — 99214 OFFICE O/P EST MOD 30 MIN: CPT | Performed by: INTERNAL MEDICINE

## 2019-04-24 DIAGNOSIS — I10 ESSENTIAL HYPERTENSION: Primary | ICD-10-CM

## 2019-04-24 DIAGNOSIS — I48.91 ATRIAL FIBRILLATION (HCC): ICD-10-CM

## 2019-04-24 DIAGNOSIS — E78.01 FAMILIAL HYPERCHOLESTEROLEMIA: ICD-10-CM

## 2019-04-24 RX ORDER — WARFARIN SODIUM 7.5 MG/1
TABLET ORAL DAILY
Qty: 90 TABLET | Refills: 3 | Status: SHIPPED | OUTPATIENT
Start: 2019-04-24 | End: 2020-05-14

## 2019-04-24 RX ORDER — LISINOPRIL 10 MG/1
TABLET ORAL DAILY
Qty: 90 TABLET | Refills: 3 | Status: SHIPPED | OUTPATIENT
Start: 2019-04-24 | End: 2020-04-29 | Stop reason: SDUPTHER

## 2019-04-24 RX ORDER — ATORVASTATIN CALCIUM 10 MG/1
TABLET, FILM COATED ORAL
Qty: 90 TABLET | Refills: 3 | Status: SHIPPED | OUTPATIENT
Start: 2019-04-24 | End: 2020-04-29 | Stop reason: SDUPTHER

## 2019-04-24 RX ORDER — AMLODIPINE BESYLATE 5 MG/1
TABLET ORAL DAILY
Qty: 90 TABLET | Refills: 3 | Status: SHIPPED | OUTPATIENT
Start: 2019-04-24 | End: 2020-06-01 | Stop reason: SDUPTHER

## 2019-04-24 RX ORDER — WARFARIN SODIUM 5 MG/1
TABLET ORAL
Qty: 90 TABLET | Refills: 3 | Status: SHIPPED | OUTPATIENT
Start: 2019-04-24 | End: 2020-06-03 | Stop reason: ALTCHOICE

## 2019-04-29 ENCOUNTER — OFFICE VISIT (OUTPATIENT)
Dept: URGENT CARE | Facility: CLINIC | Age: 84
End: 2019-04-29
Payer: MEDICARE

## 2019-04-29 ENCOUNTER — HOSPITAL ENCOUNTER (OUTPATIENT)
Facility: HOSPITAL | Age: 84
Setting detail: OBSERVATION
Discharge: HOME | End: 2019-04-30
Attending: EMERGENCY MEDICINE | Admitting: COLON & RECTAL SURGERY
Payer: MEDICARE

## 2019-04-29 ENCOUNTER — ANESTHESIA EVENT (OUTPATIENT)
Dept: GASTROENTEROLOGY | Facility: HOSPITAL | Age: 84
End: 2019-04-29
Payer: MEDICARE

## 2019-04-29 VITALS
HEART RATE: 74 BPM | WEIGHT: 172.4 LBS | HEIGHT: 69 IN | SYSTOLIC BLOOD PRESSURE: 186 MMHG | OXYGEN SATURATION: 98 % | TEMPERATURE: 97.2 F | RESPIRATION RATE: 18 BRPM | DIASTOLIC BLOOD PRESSURE: 83 MMHG | BODY MASS INDEX: 25.53 KG/M2

## 2019-04-29 DIAGNOSIS — I48.91 ATRIAL FIBRILLATION (HCC): ICD-10-CM

## 2019-04-29 DIAGNOSIS — K62.5 RECTAL BLEEDING: Primary | ICD-10-CM

## 2019-04-29 DIAGNOSIS — K62.5 RECTAL BLEEDING: ICD-10-CM

## 2019-04-29 DIAGNOSIS — K62.5 RECTAL BLEED: Primary | ICD-10-CM

## 2019-04-29 LAB
ABO GROUP BLD: NORMAL
ALBUMIN SERPL BCP-MCNC: 3.1 G/DL (ref 3.5–5)
ALP SERPL-CCNC: 69 U/L (ref 46–116)
ALT SERPL W P-5'-P-CCNC: 21 U/L (ref 12–78)
ANION GAP SERPL CALCULATED.3IONS-SCNC: 6 MMOL/L (ref 4–13)
APTT PPP: 38 SECONDS (ref 26–38)
AST SERPL W P-5'-P-CCNC: 14 U/L (ref 5–45)
ATRIAL RATE: 73 BPM
BASOPHILS # BLD AUTO: 0.01 THOUSANDS/ΜL (ref 0–0.1)
BASOPHILS NFR BLD AUTO: 0 % (ref 0–1)
BILIRUB SERPL-MCNC: 0.4 MG/DL (ref 0.2–1)
BLD GP AB SCN SERPL QL: NEGATIVE
BUN SERPL-MCNC: 16 MG/DL (ref 5–25)
CALCIUM SERPL-MCNC: 7.9 MG/DL (ref 8.3–10.1)
CHLORIDE SERPL-SCNC: 106 MMOL/L (ref 100–108)
CO2 SERPL-SCNC: 26 MMOL/L (ref 21–32)
CREAT SERPL-MCNC: 0.88 MG/DL (ref 0.6–1.3)
EOSINOPHIL # BLD AUTO: 0.04 THOUSAND/ΜL (ref 0–0.61)
EOSINOPHIL NFR BLD AUTO: 1 % (ref 0–6)
ERYTHROCYTE [DISTWIDTH] IN BLOOD BY AUTOMATED COUNT: 12.4 % (ref 11.6–15.1)
GFR SERPL CREATININE-BSD FRML MDRD: 75 ML/MIN/1.73SQ M
GLUCOSE SERPL-MCNC: 211 MG/DL (ref 65–140)
HCT VFR BLD AUTO: 37.6 % (ref 36.5–49.3)
HCT VFR BLD AUTO: 38.8 % (ref 36.5–49.3)
HGB BLD-MCNC: 12.8 G/DL (ref 12–17)
HGB BLD-MCNC: 12.9 G/DL (ref 12–17)
HOLD SPECIMEN: NORMAL
IMM GRANULOCYTES # BLD AUTO: 0.02 THOUSAND/UL (ref 0–0.2)
IMM GRANULOCYTES NFR BLD AUTO: 0 % (ref 0–2)
INR PPP: 2.11 (ref 0.86–1.17)
LIPASE SERPL-CCNC: 74 U/L (ref 73–393)
LYMPHOCYTES # BLD AUTO: 0.85 THOUSANDS/ΜL (ref 0.6–4.47)
LYMPHOCYTES NFR BLD AUTO: 17 % (ref 14–44)
MCH RBC QN AUTO: 32.6 PG (ref 26.8–34.3)
MCHC RBC AUTO-ENTMCNC: 34 G/DL (ref 31.4–37.4)
MCV RBC AUTO: 96 FL (ref 82–98)
MONOCYTES # BLD AUTO: 0.33 THOUSAND/ΜL (ref 0.17–1.22)
MONOCYTES NFR BLD AUTO: 7 % (ref 4–12)
NEUTROPHILS # BLD AUTO: 3.72 THOUSANDS/ΜL (ref 1.85–7.62)
NEUTS SEG NFR BLD AUTO: 75 % (ref 43–75)
NRBC BLD AUTO-RTO: 0 /100 WBCS
P AXIS: 34 DEGREES
PLATELET # BLD AUTO: 181 THOUSANDS/UL (ref 149–390)
PMV BLD AUTO: 8.8 FL (ref 8.9–12.7)
POTASSIUM SERPL-SCNC: 3.7 MMOL/L (ref 3.5–5.3)
PR INTERVAL: 202 MS
PROT SERPL-MCNC: 6.5 G/DL (ref 6.4–8.2)
PROTHROMBIN TIME: 23.7 SECONDS (ref 11.8–14.2)
QRS AXIS: -24 DEGREES
QRSD INTERVAL: 142 MS
QT INTERVAL: 418 MS
QTC INTERVAL: 460 MS
RBC # BLD AUTO: 3.93 MILLION/UL (ref 3.88–5.62)
RH BLD: POSITIVE
SODIUM SERPL-SCNC: 138 MMOL/L (ref 136–145)
SPECIMEN EXPIRATION DATE: NORMAL
T WAVE AXIS: 97 DEGREES
VENTRICULAR RATE: 73 BPM
WBC # BLD AUTO: 4.97 THOUSAND/UL (ref 4.31–10.16)

## 2019-04-29 PROCEDURE — 93010 ELECTROCARDIOGRAM REPORT: CPT | Performed by: INTERNAL MEDICINE

## 2019-04-29 PROCEDURE — 93005 ELECTROCARDIOGRAM TRACING: CPT

## 2019-04-29 PROCEDURE — 86901 BLOOD TYPING SEROLOGIC RH(D): CPT | Performed by: EMERGENCY MEDICINE

## 2019-04-29 PROCEDURE — 85610 PROTHROMBIN TIME: CPT | Performed by: EMERGENCY MEDICINE

## 2019-04-29 PROCEDURE — 86850 RBC ANTIBODY SCREEN: CPT | Performed by: EMERGENCY MEDICINE

## 2019-04-29 PROCEDURE — 99285 EMERGENCY DEPT VISIT HI MDM: CPT

## 2019-04-29 PROCEDURE — 86900 BLOOD TYPING SEROLOGIC ABO: CPT | Performed by: EMERGENCY MEDICINE

## 2019-04-29 PROCEDURE — 99213 OFFICE O/P EST LOW 20 MIN: CPT | Performed by: PHYSICIAN ASSISTANT

## 2019-04-29 PROCEDURE — 99284 EMERGENCY DEPT VISIT MOD MDM: CPT | Performed by: EMERGENCY MEDICINE

## 2019-04-29 PROCEDURE — 80053 COMPREHEN METABOLIC PANEL: CPT | Performed by: EMERGENCY MEDICINE

## 2019-04-29 PROCEDURE — 85025 COMPLETE CBC W/AUTO DIFF WBC: CPT | Performed by: EMERGENCY MEDICINE

## 2019-04-29 PROCEDURE — 85014 HEMATOCRIT: CPT | Performed by: SURGERY

## 2019-04-29 PROCEDURE — 36415 COLL VENOUS BLD VENIPUNCTURE: CPT

## 2019-04-29 PROCEDURE — 83690 ASSAY OF LIPASE: CPT | Performed by: EMERGENCY MEDICINE

## 2019-04-29 PROCEDURE — 85018 HEMOGLOBIN: CPT | Performed by: SURGERY

## 2019-04-29 PROCEDURE — 1124F ACP DISCUSS-NO DSCNMKR DOCD: CPT | Performed by: EMERGENCY MEDICINE

## 2019-04-29 PROCEDURE — 94760 N-INVAS EAR/PLS OXIMETRY 1: CPT

## 2019-04-29 PROCEDURE — G0463 HOSPITAL OUTPT CLINIC VISIT: HCPCS | Performed by: PHYSICIAN ASSISTANT

## 2019-04-29 PROCEDURE — 85730 THROMBOPLASTIN TIME PARTIAL: CPT | Performed by: EMERGENCY MEDICINE

## 2019-04-29 PROCEDURE — 94762 N-INVAS EAR/PLS OXIMTRY CONT: CPT

## 2019-04-29 RX ORDER — ATORVASTATIN CALCIUM 10 MG/1
10 TABLET, FILM COATED ORAL
Status: DISCONTINUED | OUTPATIENT
Start: 2019-04-29 | End: 2019-04-30 | Stop reason: HOSPADM

## 2019-04-29 RX ORDER — HYDRALAZINE HYDROCHLORIDE 20 MG/ML
5 INJECTION INTRAMUSCULAR; INTRAVENOUS EVERY 4 HOURS PRN
Status: DISCONTINUED | OUTPATIENT
Start: 2019-04-29 | End: 2019-04-30 | Stop reason: HOSPADM

## 2019-04-29 RX ORDER — POLYVINYL ALCOHOL 14 MG/ML
1 SOLUTION/ DROPS OPHTHALMIC 2 TIMES DAILY
Status: DISCONTINUED | OUTPATIENT
Start: 2019-04-29 | End: 2019-04-30 | Stop reason: HOSPADM

## 2019-04-29 RX ORDER — SODIUM CHLORIDE, SODIUM LACTATE, POTASSIUM CHLORIDE, CALCIUM CHLORIDE 600; 310; 30; 20 MG/100ML; MG/100ML; MG/100ML; MG/100ML
60 INJECTION, SOLUTION INTRAVENOUS CONTINUOUS
Status: DISCONTINUED | OUTPATIENT
Start: 2019-04-29 | End: 2019-04-30 | Stop reason: HOSPADM

## 2019-04-29 RX ORDER — LABETALOL 20 MG/4 ML (5 MG/ML) INTRAVENOUS SYRINGE
10 EVERY 4 HOURS PRN
Status: DISCONTINUED | OUTPATIENT
Start: 2019-04-29 | End: 2019-04-30 | Stop reason: HOSPADM

## 2019-04-29 RX ADMIN — HYDRALAZINE HYDROCHLORIDE 5 MG: 20 INJECTION INTRAMUSCULAR; INTRAVENOUS at 17:56

## 2019-04-29 RX ADMIN — ATORVASTATIN CALCIUM 10 MG: 10 TABLET, FILM COATED ORAL at 21:19

## 2019-04-29 RX ADMIN — LABETALOL 20 MG/4 ML (5 MG/ML) INTRAVENOUS SYRINGE 10 MG: at 21:22

## 2019-04-29 RX ADMIN — SODIUM CHLORIDE, SODIUM LACTATE, POTASSIUM CHLORIDE, AND CALCIUM CHLORIDE 100 ML/HR: .6; .31; .03; .02 INJECTION, SOLUTION INTRAVENOUS at 14:28

## 2019-04-29 RX ADMIN — POLYVINYL ALCOHOL 1 DROP: 14 SOLUTION/ DROPS OPHTHALMIC at 17:35

## 2019-04-29 RX ADMIN — POLYETHYLENE GLYCOL 3350, SODIUM SULFATE ANHYDROUS, SODIUM BICARBONATE, SODIUM CHLORIDE, POTASSIUM CHLORIDE 4000 ML: 236; 22.74; 6.74; 5.86; 2.97 POWDER, FOR SOLUTION ORAL at 14:33

## 2019-04-30 ENCOUNTER — ANESTHESIA (OUTPATIENT)
Dept: GASTROENTEROLOGY | Facility: HOSPITAL | Age: 84
End: 2019-04-30
Payer: MEDICARE

## 2019-04-30 VITALS
BODY MASS INDEX: 24.75 KG/M2 | DIASTOLIC BLOOD PRESSURE: 58 MMHG | OXYGEN SATURATION: 100 % | HEIGHT: 69 IN | RESPIRATION RATE: 20 BRPM | WEIGHT: 167.1 LBS | TEMPERATURE: 97.7 F | SYSTOLIC BLOOD PRESSURE: 121 MMHG | HEART RATE: 74 BPM

## 2019-04-30 LAB
ANION GAP SERPL CALCULATED.3IONS-SCNC: 8 MMOL/L (ref 4–13)
BASOPHILS # BLD AUTO: 0.01 THOUSANDS/ΜL (ref 0–0.1)
BASOPHILS NFR BLD AUTO: 0 % (ref 0–1)
BUN SERPL-MCNC: 10 MG/DL (ref 5–25)
CALCIUM SERPL-MCNC: 7.9 MG/DL (ref 8.3–10.1)
CHLORIDE SERPL-SCNC: 109 MMOL/L (ref 100–108)
CO2 SERPL-SCNC: 26 MMOL/L (ref 21–32)
CREAT SERPL-MCNC: 0.74 MG/DL (ref 0.6–1.3)
EOSINOPHIL # BLD AUTO: 0.08 THOUSAND/ΜL (ref 0–0.61)
EOSINOPHIL NFR BLD AUTO: 2 % (ref 0–6)
ERYTHROCYTE [DISTWIDTH] IN BLOOD BY AUTOMATED COUNT: 12.5 % (ref 11.6–15.1)
GFR SERPL CREATININE-BSD FRML MDRD: 80 ML/MIN/1.73SQ M
GLUCOSE SERPL-MCNC: 116 MG/DL (ref 65–140)
HCT VFR BLD AUTO: 36 % (ref 36.5–49.3)
HGB BLD-MCNC: 12.3 G/DL (ref 12–17)
IMM GRANULOCYTES # BLD AUTO: 0.02 THOUSAND/UL (ref 0–0.2)
IMM GRANULOCYTES NFR BLD AUTO: 0 % (ref 0–2)
INR PPP: 2.03 (ref 0.86–1.17)
LYMPHOCYTES # BLD AUTO: 1.13 THOUSANDS/ΜL (ref 0.6–4.47)
LYMPHOCYTES NFR BLD AUTO: 22 % (ref 14–44)
MCH RBC QN AUTO: 32.7 PG (ref 26.8–34.3)
MCHC RBC AUTO-ENTMCNC: 34.2 G/DL (ref 31.4–37.4)
MCV RBC AUTO: 96 FL (ref 82–98)
MONOCYTES # BLD AUTO: 0.59 THOUSAND/ΜL (ref 0.17–1.22)
MONOCYTES NFR BLD AUTO: 11 % (ref 4–12)
NEUTROPHILS # BLD AUTO: 3.43 THOUSANDS/ΜL (ref 1.85–7.62)
NEUTS SEG NFR BLD AUTO: 65 % (ref 43–75)
NRBC BLD AUTO-RTO: 0 /100 WBCS
PLATELET # BLD AUTO: 182 THOUSANDS/UL (ref 149–390)
PMV BLD AUTO: 9.1 FL (ref 8.9–12.7)
POTASSIUM SERPL-SCNC: 3.1 MMOL/L (ref 3.5–5.3)
PROTHROMBIN TIME: 23 SECONDS (ref 11.8–14.2)
RBC # BLD AUTO: 3.76 MILLION/UL (ref 3.88–5.62)
SODIUM SERPL-SCNC: 143 MMOL/L (ref 136–145)
WBC # BLD AUTO: 5.26 THOUSAND/UL (ref 4.31–10.16)

## 2019-04-30 PROCEDURE — 85610 PROTHROMBIN TIME: CPT | Performed by: SURGERY

## 2019-04-30 PROCEDURE — 45382 COLONOSCOPY W/CONTROL BLEED: CPT | Performed by: COLON & RECTAL SURGERY

## 2019-04-30 PROCEDURE — 80048 BASIC METABOLIC PNL TOTAL CA: CPT | Performed by: SURGERY

## 2019-04-30 PROCEDURE — 99213 OFFICE O/P EST LOW 20 MIN: CPT | Performed by: COLON & RECTAL SURGERY

## 2019-04-30 PROCEDURE — 85025 COMPLETE CBC W/AUTO DIFF WBC: CPT | Performed by: SURGERY

## 2019-04-30 RX ORDER — PROPOFOL 10 MG/ML
INJECTION, EMULSION INTRAVENOUS AS NEEDED
Status: DISCONTINUED | OUTPATIENT
Start: 2019-04-30 | End: 2019-04-30 | Stop reason: SURG

## 2019-04-30 RX ORDER — EPHEDRINE SULFATE 50 MG/ML
INJECTION INTRAVENOUS AS NEEDED
Status: DISCONTINUED | OUTPATIENT
Start: 2019-04-30 | End: 2019-04-30 | Stop reason: SURG

## 2019-04-30 RX ORDER — SODIUM CHLORIDE 9 MG/ML
50 INJECTION, SOLUTION INTRAVENOUS CONTINUOUS
Status: CANCELLED | OUTPATIENT
Start: 2019-04-30

## 2019-04-30 RX ORDER — PROPOFOL 10 MG/ML
INJECTION, EMULSION INTRAVENOUS CONTINUOUS PRN
Status: DISCONTINUED | OUTPATIENT
Start: 2019-04-30 | End: 2019-04-30 | Stop reason: SURG

## 2019-04-30 RX ORDER — LIDOCAINE HYDROCHLORIDE 10 MG/ML
INJECTION, SOLUTION INFILTRATION; PERINEURAL AS NEEDED
Status: DISCONTINUED | OUTPATIENT
Start: 2019-04-30 | End: 2019-04-30 | Stop reason: SURG

## 2019-04-30 RX ORDER — LIDOCAINE HYDROCHLORIDE 10 MG/ML
0.5 INJECTION, SOLUTION EPIDURAL; INFILTRATION; INTRACAUDAL; PERINEURAL ONCE AS NEEDED
Status: CANCELLED | OUTPATIENT
Start: 2019-04-30

## 2019-04-30 RX ADMIN — PROPOFOL 50 MG: 10 INJECTION, EMULSION INTRAVENOUS at 08:41

## 2019-04-30 RX ADMIN — PHENYLEPHRINE HYDROCHLORIDE 100 MCG: 10 INJECTION INTRAVENOUS at 08:51

## 2019-04-30 RX ADMIN — EPHEDRINE SULFATE 10 MG: 50 INJECTION, SOLUTION INTRAVENOUS at 08:48

## 2019-04-30 RX ADMIN — SODIUM CHLORIDE, SODIUM LACTATE, POTASSIUM CHLORIDE, AND CALCIUM CHLORIDE 100 ML/HR: .6; .31; .03; .02 INJECTION, SOLUTION INTRAVENOUS at 00:32

## 2019-04-30 RX ADMIN — LIDOCAINE HYDROCHLORIDE 50 MG: 10 INJECTION, SOLUTION INFILTRATION; PERINEURAL at 08:41

## 2019-04-30 RX ADMIN — PROPOFOL 20 MG: 10 INJECTION, EMULSION INTRAVENOUS at 08:52

## 2019-04-30 RX ADMIN — PROPOFOL 60 MCG/KG/MIN: 10 INJECTION, EMULSION INTRAVENOUS at 08:42

## 2019-04-30 RX ADMIN — SODIUM CHLORIDE, SODIUM LACTATE, POTASSIUM CHLORIDE, AND CALCIUM CHLORIDE: .6; .31; .03; .02 INJECTION, SOLUTION INTRAVENOUS at 08:37

## 2019-04-30 RX ADMIN — EPHEDRINE SULFATE 5 MG: 50 INJECTION, SOLUTION INTRAVENOUS at 08:45

## 2019-04-30 RX ADMIN — PHENYLEPHRINE HYDROCHLORIDE 200 MCG: 10 INJECTION INTRAVENOUS at 08:57

## 2019-05-13 ENCOUNTER — OFFICE VISIT (OUTPATIENT)
Dept: INTERNAL MEDICINE CLINIC | Facility: CLINIC | Age: 84
End: 2019-05-13
Payer: MEDICARE

## 2019-05-13 VITALS
HEART RATE: 84 BPM | TEMPERATURE: 97.8 F | DIASTOLIC BLOOD PRESSURE: 70 MMHG | HEIGHT: 69 IN | BODY MASS INDEX: 25.53 KG/M2 | SYSTOLIC BLOOD PRESSURE: 158 MMHG | WEIGHT: 172.4 LBS | OXYGEN SATURATION: 96 %

## 2019-05-13 DIAGNOSIS — I48.0 PAROXYSMAL ATRIAL FIBRILLATION (HCC): ICD-10-CM

## 2019-05-13 DIAGNOSIS — K59.09 CHRONIC CONSTIPATION: ICD-10-CM

## 2019-05-13 DIAGNOSIS — K62.7 RADIATION PROCTITIS: Primary | ICD-10-CM

## 2019-05-13 PROBLEM — K62.5 RECTAL BLEED: Status: RESOLVED | Noted: 2019-04-29 | Resolved: 2019-05-13

## 2019-05-13 PROCEDURE — 99214 OFFICE O/P EST MOD 30 MIN: CPT | Performed by: INTERNAL MEDICINE

## 2019-05-13 PROCEDURE — 1124F ACP DISCUSS-NO DSCNMKR DOCD: CPT | Performed by: INTERNAL MEDICINE

## 2019-05-28 ENCOUNTER — APPOINTMENT (OUTPATIENT)
Dept: LAB | Facility: CLINIC | Age: 84
End: 2019-05-28
Payer: MEDICARE

## 2019-05-28 ENCOUNTER — ANTICOAG VISIT (OUTPATIENT)
Dept: CARDIOLOGY CLINIC | Facility: CLINIC | Age: 84
End: 2019-05-28

## 2019-06-28 ENCOUNTER — ANTICOAG VISIT (OUTPATIENT)
Dept: CARDIOLOGY CLINIC | Facility: CLINIC | Age: 84
End: 2019-06-28

## 2019-06-28 ENCOUNTER — APPOINTMENT (OUTPATIENT)
Dept: LAB | Facility: CLINIC | Age: 84
End: 2019-06-28
Payer: MEDICARE

## 2019-07-04 DIAGNOSIS — I10 ESSENTIAL HYPERTENSION: ICD-10-CM

## 2019-07-24 ENCOUNTER — APPOINTMENT (OUTPATIENT)
Dept: LAB | Facility: CLINIC | Age: 84
End: 2019-07-24
Payer: MEDICARE

## 2019-07-24 ENCOUNTER — ANTICOAG VISIT (OUTPATIENT)
Dept: CARDIOLOGY CLINIC | Facility: CLINIC | Age: 84
End: 2019-07-24

## 2019-07-24 DIAGNOSIS — C61 MALIGNANT NEOPLASM OF PROSTATE (HCC): ICD-10-CM

## 2019-07-24 DIAGNOSIS — E78.5 DYSLIPIDEMIA: ICD-10-CM

## 2019-07-24 DIAGNOSIS — E11.9 TYPE 2 DIABETES MELLITUS WITHOUT COMPLICATION, UNSPECIFIED WHETHER LONG TERM INSULIN USE (HCC): ICD-10-CM

## 2019-07-24 LAB
ANION GAP SERPL CALCULATED.3IONS-SCNC: 8 MMOL/L (ref 4–13)
BUN SERPL-MCNC: 17 MG/DL (ref 5–25)
CALCIUM SERPL-MCNC: 8.4 MG/DL (ref 8.3–10.1)
CHLORIDE SERPL-SCNC: 110 MMOL/L (ref 100–108)
CHOLEST SERPL-MCNC: 144 MG/DL (ref 50–200)
CO2 SERPL-SCNC: 26 MMOL/L (ref 21–32)
CREAT SERPL-MCNC: 0.79 MG/DL (ref 0.6–1.3)
EST. AVERAGE GLUCOSE BLD GHB EST-MCNC: 137 MG/DL
GFR SERPL CREATININE-BSD FRML MDRD: 78 ML/MIN/1.73SQ M
GLUCOSE P FAST SERPL-MCNC: 126 MG/DL (ref 65–99)
HBA1C MFR BLD: 6.4 % (ref 4.2–6.3)
HDLC SERPL-MCNC: 68 MG/DL (ref 40–60)
LDLC SERPL CALC-MCNC: 64 MG/DL (ref 0–100)
NONHDLC SERPL-MCNC: 76 MG/DL
POTASSIUM SERPL-SCNC: 3.9 MMOL/L (ref 3.5–5.3)
SODIUM SERPL-SCNC: 144 MMOL/L (ref 136–145)
TRIGL SERPL-MCNC: 58 MG/DL

## 2019-07-24 PROCEDURE — 80048 BASIC METABOLIC PNL TOTAL CA: CPT

## 2019-07-24 PROCEDURE — 83036 HEMOGLOBIN GLYCOSYLATED A1C: CPT

## 2019-07-24 PROCEDURE — 80061 LIPID PANEL: CPT

## 2019-07-29 ENCOUNTER — OFFICE VISIT (OUTPATIENT)
Dept: INTERNAL MEDICINE CLINIC | Facility: CLINIC | Age: 84
End: 2019-07-29
Payer: MEDICARE

## 2019-07-29 VITALS
HEIGHT: 69 IN | DIASTOLIC BLOOD PRESSURE: 72 MMHG | HEART RATE: 79 BPM | WEIGHT: 173.8 LBS | SYSTOLIC BLOOD PRESSURE: 144 MMHG | TEMPERATURE: 99.8 F | BODY MASS INDEX: 25.74 KG/M2 | OXYGEN SATURATION: 98 %

## 2019-07-29 DIAGNOSIS — E78.5 DYSLIPIDEMIA: ICD-10-CM

## 2019-07-29 DIAGNOSIS — I48.0 PAROXYSMAL ATRIAL FIBRILLATION (HCC): ICD-10-CM

## 2019-07-29 DIAGNOSIS — M05.9 SEROPOSITIVE RHEUMATOID ARTHRITIS (HCC): ICD-10-CM

## 2019-07-29 DIAGNOSIS — E11.9 CONTROLLED TYPE 2 DIABETES MELLITUS WITHOUT COMPLICATION, WITHOUT LONG-TERM CURRENT USE OF INSULIN (HCC): Primary | ICD-10-CM

## 2019-07-29 DIAGNOSIS — I10 ESSENTIAL HYPERTENSION: ICD-10-CM

## 2019-07-29 DIAGNOSIS — E11.9 CONTROLLED TYPE 2 DIABETES MELLITUS WITHOUT COMPLICATION, WITHOUT LONG-TERM CURRENT USE OF INSULIN (HCC): ICD-10-CM

## 2019-07-29 DIAGNOSIS — M1A.9XX0 CHRONIC GOUT INVOLVING TOE OF RIGHT FOOT WITHOUT TOPHUS, UNSPECIFIED CAUSE: Primary | ICD-10-CM

## 2019-07-29 PROBLEM — K62.7 RADIATION PROCTITIS: Status: RESOLVED | Noted: 2019-05-13 | Resolved: 2019-07-29

## 2019-07-29 PROBLEM — C61 MALIGNANT NEOPLASM OF PROSTATE (HCC): Status: RESOLVED | Noted: 2019-04-23 | Resolved: 2019-07-29

## 2019-07-29 PROCEDURE — 99214 OFFICE O/P EST MOD 30 MIN: CPT | Performed by: INTERNAL MEDICINE

## 2019-07-29 RX ORDER — DOCUSATE SODIUM 250 MG
1 CAPSULE ORAL AS NEEDED
COMMUNITY
Start: 2019-05-13 | End: 2020-07-27

## 2019-07-29 RX ORDER — FOLIC ACID 1 MG/1
2 TABLET ORAL DAILY
COMMUNITY
Start: 2019-06-19

## 2019-07-29 NOTE — PROGRESS NOTES
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, playing golf regularly, with excellent exercise tolerance and no cardiac symptoms with a history of paroxysmal atrial fibrillation  Active follow-up continues with Rheumatology in on low-dose methotrexate there been no recent recurrences of rheumatoid arthritis symptoms  We reviewed his labs from July 24th including hemoglobin A1c of 6 4, normal GFR 78, blood sugar 126, lipid panel showing total cholesterol 144, LDL 64, HDL 68 and triglycerides of 58  Eye care, dental care up-to-date  There are no foot problems and next visit will be foot exam as part of his visit  HPI  There are no acute complaints  We discussed maintaining healthy lifestyle and his optimal combination of recreation time and work  Follow-up will be in 3 months and lab slip printed today will be done 1 week prior to next visit    The following portions of the patient's history were reviewed and updated as appropriate: allergies, current medications, past family history, past medical history, past social history, past surgical history and problem list     Review of Systems      Objective:      /72 (BP Location: Left arm, Patient Position: Sitting, Cuff Size: Standard)   Pulse 79   Temp 99 8 °F (37 7 °C) (Tympanic)   Ht 5' 9" (1 753 m)   Wt 78 8 kg (173 lb 12 8 oz)   SpO2 98%   BMI 25 67 kg/m²      Wt Readings from Last 3 Encounters:   07/29/19 78 8 kg (173 lb 12 8 oz)   05/20/19 78 5 kg (173 lb)   05/13/19 78 2 kg (172 lb 6 4 oz)     Temp Readings from Last 3 Encounters:   07/29/19 99 8 °F (37 7 °C) (Tympanic)   05/13/19 97 8 °F (36 6 °C) (Tympanic)   04/30/19 97 7 °F (36 5 °C) (Tympanic)     BP Readings from Last 3 Encounters:   07/29/19 144/72   05/20/19 163/76   05/13/19 158/70     Pulse Readings from Last 3 Encounters:   07/29/19 79   05/20/19 (!) 112   05/13/19 84        Physical Exam    Vital signs stable, very pleasant gentleman with well compensated hearing with hearing aids in place, looking much younger than his stated age  Strength and balance are excellent  Cognitive status is intact  No JVD  No carotid bruits and carotid pulses are normal   Lungs are clear and cardiac exam is normal on auscultation other than splitting of S2  There is no acute synovitis noted and joint function is overall well preserved  Strength and balance are excellent  Peripheral circulation is intact with no edema    Patient Active Problem List   Diagnosis    Benign prostatic hyperplasia    Chronic constipation    Diabetes mellitus type II, controlled ([facility]    Diverticulosis    Dyslipidemia    History of prostate cancer    Hypertension    Left bundle-branch block    Paroxysmal atrial fibrillation (HCC)    Seropositive rheumatoid arthritis (HCC)    Spondylosis of cervical region without myelopathy or radiculopathy    Venous insufficiency    Primary osteoarthritis involving multiple joints    Chronic gout involving toe of right foot without tophus       Current Outpatient Medications on File Prior to Visit   Medication Sig Dispense Refill    amLODIPine (NORVASC) 5 mg tablet TAKE 1 TABLET BY MOUTH  DAILY 90 tablet 3    ARTIFICIAL TEARS 0 1-0 3 % SOLN Apply 1 drop to eye 2 (two) times a day      aspirin 81 MG tablet Take 1 tablet by mouth daily      atorvastatin (LIPITOR) 10 mg tablet TAKE 1 TABLET BY MOUTH AT  BEDTIME 90 tablet 3    bisacodyl (DULCOLAX) 5 mg EC tablet Take 1 tablet by mouth daily as needed      Cholecalciferol 2000 units CAPS Take 1 capsule by mouth daily      latanoprost (XALATAN) 0 005 % ophthalmic solution Apply 0 005 % to eye daily      lisinopril (ZESTRIL) 10 mg tablet TAKE 1 TABLET BY MOUTH  DAILY 90 tablet 3    methotrexate 2 5 mg tablet Take 2 tablets by mouth every 7 days        Multiple Vitamin tablet Take 1 tablet by mouth daily      Omega-3 Fatty Acids (FISH OIL CONCENTRATE) 1000 MG CAPS Take 4 capsules by mouth daily      warfarin (COUMADIN) 5 mg tablet TAKE 1 TABLET BY MOUTH  DAILY AS DIRECTED 90 tablet 3    warfarin (COUMADIN) 7 5 mg tablet TAKE 1 TABLET BY MOUTH  DAILY 90 tablet 3    [DISCONTINUED] folic acid (FOLVITE) 1 mg tablet Take 2 tablets by mouth daily        [DISCONTINUED] metoprolol tartrate (LOPRESSOR) 25 mg tablet TAKE ONE TABLET BY MOUTH EVERY 12 HOURS 180 tablet 3     No current facility-administered medications on file prior to visit

## 2019-08-28 ENCOUNTER — APPOINTMENT (OUTPATIENT)
Dept: LAB | Facility: CLINIC | Age: 84
End: 2019-08-28
Payer: MEDICARE

## 2019-08-28 ENCOUNTER — ANTICOAG VISIT (OUTPATIENT)
Dept: CARDIOLOGY CLINIC | Facility: CLINIC | Age: 84
End: 2019-08-28

## 2019-09-11 ENCOUNTER — APPOINTMENT (OUTPATIENT)
Dept: LAB | Facility: CLINIC | Age: 84
End: 2019-09-11
Payer: MEDICARE

## 2019-09-11 ENCOUNTER — ANTICOAG VISIT (OUTPATIENT)
Dept: CARDIOLOGY CLINIC | Facility: CLINIC | Age: 84
End: 2019-09-11

## 2019-09-30 ENCOUNTER — APPOINTMENT (OUTPATIENT)
Dept: LAB | Facility: CLINIC | Age: 84
End: 2019-09-30
Payer: MEDICARE

## 2019-09-30 ENCOUNTER — ANTICOAG VISIT (OUTPATIENT)
Dept: CARDIOLOGY CLINIC | Facility: CLINIC | Age: 84
End: 2019-09-30

## 2019-09-30 ENCOUNTER — TRANSCRIBE ORDERS (OUTPATIENT)
Dept: LAB | Facility: CLINIC | Age: 84
End: 2019-09-30

## 2019-09-30 DIAGNOSIS — C80.1 MALIGNANT (PRIMARY) NEOPLASM, UNSPECIFIED (HCC): ICD-10-CM

## 2019-09-30 DIAGNOSIS — C80.1 MALIGNANT (PRIMARY) NEOPLASM, UNSPECIFIED (HCC): Primary | ICD-10-CM

## 2019-09-30 LAB — PSA SERPL-MCNC: 0.4 NG/ML (ref 0–4)

## 2019-09-30 PROCEDURE — 84153 ASSAY OF PSA TOTAL: CPT

## 2019-09-30 PROCEDURE — 36415 COLL VENOUS BLD VENIPUNCTURE: CPT

## 2019-10-02 ENCOUNTER — OFFICE VISIT (OUTPATIENT)
Dept: UROLOGY | Facility: CLINIC | Age: 84
End: 2019-10-02
Payer: MEDICARE

## 2019-10-02 VITALS
WEIGHT: 173 LBS | BODY MASS INDEX: 25.62 KG/M2 | SYSTOLIC BLOOD PRESSURE: 152 MMHG | HEART RATE: 74 BPM | HEIGHT: 69 IN | DIASTOLIC BLOOD PRESSURE: 72 MMHG

## 2019-10-02 DIAGNOSIS — R35.0 URINARY FREQUENCY: ICD-10-CM

## 2019-10-02 DIAGNOSIS — Z85.46 HISTORY OF PROSTATE CANCER: Primary | ICD-10-CM

## 2019-10-02 DIAGNOSIS — C61 MALIGNANT NEOPLASM OF PROSTATE (HCC): ICD-10-CM

## 2019-10-02 LAB
SL AMB  POCT GLUCOSE, UA: NORMAL
SL AMB LEUKOCYTE ESTERASE,UA: NORMAL
SL AMB POCT BILIRUBIN,UA: NORMAL
SL AMB POCT BLOOD,UA: NORMAL
SL AMB POCT CLARITY,UA: CLEAR
SL AMB POCT COLOR,UA: YELLOW
SL AMB POCT KETONES,UA: NORMAL
SL AMB POCT NITRITE,UA: NORMAL
SL AMB POCT PH,UA: 8
SL AMB POCT SPECIFIC GRAVITY,UA: 1
SL AMB POCT URINE PROTEIN: NORMAL
SL AMB POCT UROBILINOGEN: NORMAL

## 2019-10-02 PROCEDURE — 81002 URINALYSIS NONAUTO W/O SCOPE: CPT | Performed by: UROLOGY

## 2019-10-02 PROCEDURE — 99213 OFFICE O/P EST LOW 20 MIN: CPT | Performed by: UROLOGY

## 2019-10-02 NOTE — PROGRESS NOTES
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 [employer]  He had prostate cancer treated with radiation therapy an androgen deprivation in 2009  He has done very well since that time  PSA has remained at 0 4  He does have frequency and urgency  He is comfortable and does not wish to be aggressive in management in that regard  He is on warfarin for treatment of atrial fibrillation  He has had 1 single episode of rectal bleeding a few years ago and no further urologic complaints  He is active in teaching and he walks 18 holes of golf twice a week      MEDS:    Current Outpatient Medications:     amLODIPine (NORVASC) 5 mg tablet, TAKE 1 TABLET BY MOUTH  DAILY, Disp: 90 tablet, Rfl: 3    ARTIFICIAL TEARS 0 1-0 3 % SOLN, Apply 1 drop to eye 2 (two) times a day, Disp: , Rfl:     aspirin 81 MG tablet, Take 1 tablet by mouth daily, Disp: , Rfl:     atorvastatin (LIPITOR) 10 mg tablet, TAKE 1 TABLET BY MOUTH AT  BEDTIME, Disp: 90 tablet, Rfl: 3    bisacodyl (DULCOLAX) 5 mg EC tablet, Take 1 tablet by mouth daily as needed, Disp: , Rfl:     Cholecalciferol 2000 units CAPS, Take 1 capsule by mouth daily, Disp: , Rfl:     docusate sodium (STOOL SOFTENER) 250 MG capsule, Take 1 capsule by mouth as needed, Disp: , Rfl:     folic acid (FOLVITE) 1 mg tablet, Take 1 mg by mouth daily, Disp: , Rfl:     latanoprost (XALATAN) 0 005 % ophthalmic solution, Apply 0 005 % to eye daily, Disp: , Rfl:     lisinopril (ZESTRIL) 10 mg tablet, TAKE 1 TABLET BY MOUTH  DAILY, Disp: 90 tablet, Rfl: 3    methotrexate 2 5 mg tablet, Take 2 tablets by mouth every 7 days  , Disp: , Rfl:     Multiple Vitamin tablet, Take 1 tablet by mouth daily, Disp: , Rfl:     Omega-3 Fatty Acids (FISH OIL CONCENTRATE) 1000 MG CAPS, Take 4 capsules by mouth daily, Disp: , Rfl:     warfarin (COUMADIN) 5 mg tablet, TAKE 1 TABLET BY MOUTH  DAILY AS DIRECTED, Disp: 90 tablet, Rfl: 3    warfarin (COUMADIN) 7 5 mg tablet, TAKE 1 TABLET BY MOUTH  DAILY, Disp: 90 tablet, Rfl: 3      PMH:  Past Medical History:   Diagnosis Date    A-fib ([place] [address] )     Basal cell carcinoma     10/9/15    Malignant neoplasm of prostate ([place] [address] )     10/9/15    Rheumatoid arthritis ([place] [address] )          [address]  Past Surgical History:   Procedure Laterality Date    CATARACT EXTRACTION      2000    COLONOSCOPY N/A 4/30/2019    Procedure: COLONOSCOPY;  Surgeon: Roxy Haider MD;  Location:  GI LAB;   Service: Colorectal    CORONARY ANGIOPLASTY WITH STENT PLACEMENT      [facility]    HEMORRHOID SURGERY      HERNIA REPAIR      PROSTATE SURGERY           FH  Family History   Problem Relation Age of Onset    Diabetes Mother     Lung cancer Mother     Heart disease Father     Stroke Father         syndrome        SH  Social History     Socioeconomic History    Marital status: Single     Spouse name: None    Number of children: None    Years of education: None    Highest education level: None   Occupational History    Occupation: Teaching   Social Needs    Financial resource strain: None    Food insecurity:     Worry: None     Inability: None    Transportation needs:     Medical: None     Non-medical: None   Tobacco Use    Smoking status: Former Smoker    Smokeless tobacco: Former User   Substance and Sexual Activity    Alcohol use: Yes     Comment: wine    Drug use: No    Sexual activity: None   Lifestyle    Physical activity:     Days per week: None     Minutes per session: None    Stress: None   Relationships    Social connections:     Talks on phone: None     Gets together: None     Attends Christianity service: None     Active member of club or organization: None     Attends meetings of clubs or organizations: None     Relationship status: None    Intimate partner violence:     Fear of current or ex partner: None     Emotionally abused: None     Physically abused: None     Forced sexual activity: None   Other Topics Concern    None   Social History Narrative    None          ROS:  Review of Systems      Vitals:  Blood pressure 152/72, pulse 74, height 5' 9" (1 753 m), weight 78 5 kg (173 lb)  Physical Exam:    elderly male appearing stated age in no acute distress  There is no gynecomastia  The abdomen is soft  I do not appreciate any liver or spleen enlargement there is no tenderness  There is no mass  The genitalia shows a non circumcised male  There is no inguinal adenopathy  No hernia defects were noted  Scrotum is unremarkable  There is no swelling or erythema  Both testes are palpated in the scrotum  Unremarkable for age  On rectal examination sphincter is normal   The prostate is fibrotic flat without nodule  Roughly 25-28 g         Lab, Imaging and other studies:  Recent Results (from the past 672 hour(s))   Protime-INR    Collection Time: 09/11/19  8:31 AM   Result Value Ref Range    Protime 23 9 (H) 11 6 - 14 5 seconds    INR 2 20 (H) 0 84 - 1 19   PSA Total, Diagnostic    Collection Time: 09/30/19  8:50 AM   Result Value Ref Range    PSA, Diagnostic 0 4 0 0 - 4 0 ng/mL   Protime-INR    Collection Time: 09/30/19  8:50 AM   Result Value Ref Range    Protime 17 6 (H) 11 6 - 14 5 seconds    INR 1 49 (H) 0 84 - 1 19   POCT urine dip    Collection Time: 10/02/19  9:31 AM   Result Value Ref Range    LEUKOCYTE ESTERASE,UA neg     NITRITE,UA neg     SL AMB POCT UROBILINOGEN neg     POCT URINE PROTEIN neg      PH,UA 8 0     BLOOD,UA neg     SPECIFIC GRAVITY,UA 1 000     KETONES,UA neg     BILIRUBIN,UA neg     GLUCOSE, UA neg      COLOR,UA yellow     CLARITY,UA clear            IMPRESSION:    1  History of prostate cancer - -no evidence of recurrence   2  Urinary urgency frequency secondary to radiation therapy    PLAN:   no urologic intervention    Patient wishes to continue yearly follow-up for prostate cancer      Please note :  Voice dictation software has been used to create this document  There may be inadvertent transcription errors

## 2019-10-14 ENCOUNTER — ANTICOAG VISIT (OUTPATIENT)
Dept: CARDIOLOGY CLINIC | Facility: CLINIC | Age: 84
End: 2019-10-14

## 2019-10-14 ENCOUNTER — APPOINTMENT (OUTPATIENT)
Dept: LAB | Facility: CLINIC | Age: 84
End: 2019-10-14
Payer: MEDICARE

## 2019-10-14 PROCEDURE — 36415 COLL VENOUS BLD VENIPUNCTURE: CPT | Performed by: INTERNAL MEDICINE

## 2019-10-28 ENCOUNTER — TRANSCRIBE ORDERS (OUTPATIENT)
Dept: ADMINISTRATIVE | Facility: HOSPITAL | Age: 84
End: 2019-10-28

## 2019-10-28 ENCOUNTER — ANTICOAG VISIT (OUTPATIENT)
Dept: CARDIOLOGY CLINIC | Facility: CLINIC | Age: 84
End: 2019-10-28

## 2019-10-28 ENCOUNTER — APPOINTMENT (OUTPATIENT)
Dept: LAB | Facility: CLINIC | Age: 84
End: 2019-10-28
Payer: MEDICARE

## 2019-10-28 DIAGNOSIS — M05.9 RHEUMATOID ARTHRITIS, SEROPOSITIVE (HCC): Primary | ICD-10-CM

## 2019-10-28 DIAGNOSIS — I48.0 PAROXYSMAL ATRIAL FIBRILLATION (HCC): ICD-10-CM

## 2019-10-28 DIAGNOSIS — M05.9 RHEUMATOID ARTHRITIS, SEROPOSITIVE (HCC): ICD-10-CM

## 2019-10-28 DIAGNOSIS — E11.69 TYPE 2 DIABETES MELLITUS WITH OTHER SPECIFIED COMPLICATION, UNSPECIFIED WHETHER LONG TERM INSULIN USE (HCC): ICD-10-CM

## 2019-10-28 DIAGNOSIS — M05.9 SEROPOSITIVE RHEUMATOID ARTHRITIS (HCC): Primary | ICD-10-CM

## 2019-10-28 DIAGNOSIS — M05.9 SEROPOSITIVE RHEUMATOID ARTHRITIS (HCC): ICD-10-CM

## 2019-10-28 DIAGNOSIS — I10 ESSENTIAL HYPERTENSION, MALIGNANT: ICD-10-CM

## 2019-10-28 LAB
ALBUMIN SERPL BCP-MCNC: 3.3 G/DL (ref 3.5–5)
ALP SERPL-CCNC: 61 U/L (ref 46–116)
ALT SERPL W P-5'-P-CCNC: 27 U/L (ref 12–78)
ANION GAP SERPL CALCULATED.3IONS-SCNC: 5 MMOL/L (ref 4–13)
AST SERPL W P-5'-P-CCNC: 18 U/L (ref 5–45)
BASOPHILS # BLD AUTO: 0.01 THOUSANDS/ΜL (ref 0–0.1)
BASOPHILS NFR BLD AUTO: 0 % (ref 0–1)
BILIRUB SERPL-MCNC: 0.41 MG/DL (ref 0.2–1)
BUN SERPL-MCNC: 17 MG/DL (ref 5–25)
CALCIUM SERPL-MCNC: 8.5 MG/DL (ref 8.3–10.1)
CHLORIDE SERPL-SCNC: 108 MMOL/L (ref 100–108)
CO2 SERPL-SCNC: 27 MMOL/L (ref 21–32)
CREAT SERPL-MCNC: 0.85 MG/DL (ref 0.6–1.3)
CREAT UR-MCNC: 75.9 MG/DL
EOSINOPHIL # BLD AUTO: 0.09 THOUSAND/ΜL (ref 0–0.61)
EOSINOPHIL NFR BLD AUTO: 2 % (ref 0–6)
ERYTHROCYTE [DISTWIDTH] IN BLOOD BY AUTOMATED COUNT: 12.8 % (ref 11.6–15.1)
EST. AVERAGE GLUCOSE BLD GHB EST-MCNC: 134 MG/DL
GFR SERPL CREATININE-BSD FRML MDRD: 76 ML/MIN/1.73SQ M
GLUCOSE P FAST SERPL-MCNC: 129 MG/DL (ref 65–99)
HBA1C MFR BLD: 6.3 % (ref 4.2–6.3)
HCT VFR BLD AUTO: 39.1 % (ref 36.5–49.3)
HGB BLD-MCNC: 12.8 G/DL (ref 12–17)
IMM GRANULOCYTES # BLD AUTO: 0.01 THOUSAND/UL (ref 0–0.2)
IMM GRANULOCYTES NFR BLD AUTO: 0 % (ref 0–2)
LYMPHOCYTES # BLD AUTO: 1.05 THOUSANDS/ΜL (ref 0.6–4.47)
LYMPHOCYTES NFR BLD AUTO: 21 % (ref 14–44)
MCH RBC QN AUTO: 32.2 PG (ref 26.8–34.3)
MCHC RBC AUTO-ENTMCNC: 32.7 G/DL (ref 31.4–37.4)
MCV RBC AUTO: 99 FL (ref 82–98)
MICROALBUMIN UR-MCNC: <5 MG/L (ref 0–20)
MICROALBUMIN/CREAT 24H UR: <7 MG/G CREATININE (ref 0–30)
MONOCYTES # BLD AUTO: 0.45 THOUSAND/ΜL (ref 0.17–1.22)
MONOCYTES NFR BLD AUTO: 9 % (ref 4–12)
NEUTROPHILS # BLD AUTO: 3.49 THOUSANDS/ΜL (ref 1.85–7.62)
NEUTS SEG NFR BLD AUTO: 68 % (ref 43–75)
NRBC BLD AUTO-RTO: 0 /100 WBCS
PLATELET # BLD AUTO: 197 THOUSANDS/UL (ref 149–390)
PMV BLD AUTO: 9.3 FL (ref 8.9–12.7)
POTASSIUM SERPL-SCNC: 3.8 MMOL/L (ref 3.5–5.3)
PROT SERPL-MCNC: 6.5 G/DL (ref 6.4–8.2)
RBC # BLD AUTO: 3.97 MILLION/UL (ref 3.88–5.62)
SODIUM SERPL-SCNC: 140 MMOL/L (ref 136–145)
TSH SERPL DL<=0.05 MIU/L-ACNC: 1.24 UIU/ML (ref 0.36–3.74)
URATE SERPL-MCNC: 4.7 MG/DL (ref 4.2–8)
WBC # BLD AUTO: 5.1 THOUSAND/UL (ref 4.31–10.16)

## 2019-10-28 PROCEDURE — 84443 ASSAY THYROID STIM HORMONE: CPT | Performed by: INTERNAL MEDICINE

## 2019-10-28 PROCEDURE — 82043 UR ALBUMIN QUANTITATIVE: CPT | Performed by: INTERNAL MEDICINE

## 2019-10-28 PROCEDURE — 80053 COMPREHEN METABOLIC PANEL: CPT | Performed by: INTERNAL MEDICINE

## 2019-10-28 PROCEDURE — 84550 ASSAY OF BLOOD/URIC ACID: CPT | Performed by: INTERNAL MEDICINE

## 2019-10-28 PROCEDURE — 85025 COMPLETE CBC W/AUTO DIFF WBC: CPT | Performed by: INTERNAL MEDICINE

## 2019-10-28 PROCEDURE — 36415 COLL VENOUS BLD VENIPUNCTURE: CPT | Performed by: INTERNAL MEDICINE

## 2019-10-28 PROCEDURE — 82570 ASSAY OF URINE CREATININE: CPT | Performed by: INTERNAL MEDICINE

## 2019-10-28 PROCEDURE — 83036 HEMOGLOBIN GLYCOSYLATED A1C: CPT | Performed by: INTERNAL MEDICINE

## 2019-10-31 ENCOUNTER — OFFICE VISIT (OUTPATIENT)
Dept: INTERNAL MEDICINE CLINIC | Facility: CLINIC | Age: 84
End: 2019-10-31
Payer: MEDICARE

## 2019-10-31 VITALS
HEART RATE: 73 BPM | SYSTOLIC BLOOD PRESSURE: 138 MMHG | WEIGHT: 172.4 LBS | DIASTOLIC BLOOD PRESSURE: 80 MMHG | HEIGHT: 69 IN | BODY MASS INDEX: 25.53 KG/M2 | OXYGEN SATURATION: 98 %

## 2019-10-31 DIAGNOSIS — I48.0 PAROXYSMAL ATRIAL FIBRILLATION (HCC): ICD-10-CM

## 2019-10-31 DIAGNOSIS — I10 ESSENTIAL HYPERTENSION: ICD-10-CM

## 2019-10-31 DIAGNOSIS — E11.9 CONTROLLED TYPE 2 DIABETES MELLITUS WITHOUT COMPLICATION, WITHOUT LONG-TERM CURRENT USE OF INSULIN (HCC): Primary | ICD-10-CM

## 2019-10-31 DIAGNOSIS — M05.9 SEROPOSITIVE RHEUMATOID ARTHRITIS (HCC): ICD-10-CM

## 2019-10-31 DIAGNOSIS — I87.2 VENOUS INSUFFICIENCY: ICD-10-CM

## 2019-10-31 DIAGNOSIS — E78.5 DYSLIPIDEMIA: ICD-10-CM

## 2019-10-31 PROCEDURE — 99214 OFFICE O/P EST MOD 30 MIN: CPT | Performed by: INTERNAL MEDICINE

## 2019-10-31 NOTE — PROGRESS NOTES
Assessment/Plan:     Diagnoses and all orders for this visit:    Controlled type 2 diabetes mellitus without complication, without long-term current use of insulin (HCC)    Essential hypertension    Dyslipidemia    Paroxysmal atrial fibrillation (HCC)    Seropositive [facility] ([facility] )    Venous insufficiency          Subjective:      Patient ID: Luis A Del Cid is a 80 y o  male who is here for a scheduled 3 month follow-up visit for chronic illness management including type 2 diabetes with excellent control with current hemoglobin A1c on October 28 of 6 3  Other labs reviewed  At 80, his health is truly remarkable, chronic medical problems are well managed, including through Rheumatology for seropositive rheumatoid arthritis with minimal symptoms, paroxysmal atrial fibrillation on long-term Coumadin without clinical bleeding, palpitations or lightheadedness, or syncope, nor any chest pain or shortness of breath  Hyperlipidemia likewise is well controlled based on recent lipids, as noticed above, diabetic control is excellent as well  He is exercising 7 days a week with excellent exercise tolerance, is involved and intellectual activities, as a professor at [employer]  His venous insufficiency is well managed with over-the-counter support stockings, with some edema developing during the day and disappearing overnight  He has had no phlebitis  He did receive a flu vaccine at his place of residence  He has had no recent infections  There are no acute complaints as well  HPI  Lab work ordered today is to be done just prior to next visit which will be in 3 months  I encouraged him to continue his optimal lifestyle, and call between visits for any problems    The following portions of the patient's history were reviewed and updated as appropriate: allergies, current medications, past family history, past medical history, past social history, past surgical history and problem list     Review of Systems      Objective:      /80 (BP Location: Left arm, Patient Position: Sitting, Cuff Size: Standard)   Pulse 73   Ht 5' 9" (1 753 m)   Wt 78 2 kg (172 lb 6 4 oz)   SpO2 98%   BMI 25 46 kg/m²      Wt Readings from Last 3 Encounters:   10/31/19 78 2 kg (172 lb 6 4 oz)   10/02/19 78 5 kg (173 lb)   07/29/19 78 8 kg (173 lb 12 8 oz)     Temp Readings from Last 3 Encounters:   07/29/19 99 8 °F (37 7 °C) (Tympanic)   05/13/19 97 8 °F (36 6 °C) (Tympanic)   04/30/19 97 7 °F (36 5 °C) (Tympanic)     BP Readings from Last 3 Encounters:   10/31/19 138/80   10/02/19 152/72   07/29/19 144/72     Pulse Readings from Last 3 Encounters:   10/31/19 73   10/02/19 74   07/29/19 79        Physical Exam    Vital signs stable, in no acute distress  HEENT:  Unremarkable  Cognitive status completely intact without any focal neurologic abnormalities  Lungs clear  Cardiac:  Regular rate and rhythm, normal S1 and split S2, no audible S3 or S4, no rub, no audible murmur  Peripheral circulation is intact with excellent distal skin integrity  There are moderate varicosities of lower legs without any skin breakdown or atrophic dermatitis, no phlebitic findings or calf tenderness is noted  The peripheral circulation is intact  Balance and strength are superior for age    Patient Active Problem List   Diagnosis    Benign prostatic hyperplasia    Chronic constipation    Diabetes mellitus type II, controlled [facility] [address] )    Diverticulosis    Dyslipidemia    History of prostate cancer    Hypertension    Left bundle-branch block    Paroxysmal atrial fibrillation (HCC)    Seropositive rheumatoid arthritis (HCC)    Spondylosis of cervical region without myelopathy or radiculopathy    Venous insufficiency    Primary osteoarthritis involving multiple joints    Chronic gout involving toe of right foot without tophus       Current Outpatient Medications on File Prior to Visit   Medication Sig Dispense Refill  amLODIPine (NORVASC) 5 mg tablet TAKE 1 TABLET BY MOUTH  DAILY 90 tablet 3    ARTIFICIAL TEARS 0 1-0 3 % SOLN Apply 1 drop to eye 2 (two) times a day      aspirin 81 MG tablet Take 1 tablet by mouth daily      atorvastatin (LIPITOR) 10 mg tablet TAKE 1 TABLET BY MOUTH AT  BEDTIME 90 tablet 3    bisacodyl (DULCOLAX) 5 mg EC tablet Take 1 tablet by mouth daily as needed      Cholecalciferol 2000 units CAPS Take 1 capsule by mouth daily      docusate sodium (STOOL SOFTENER) 250 MG capsule Take 1 capsule by mouth as needed      folic acid (FOLVITE) 1 mg tablet Take 1 mg by mouth daily      latanoprost (XALATAN) 0 005 % ophthalmic solution Apply 0 005 % to eye daily      lisinopril (ZESTRIL) 10 mg tablet TAKE 1 TABLET BY MOUTH  DAILY 90 tablet 3    methotrexate 2 5 mg tablet Take 2 tablets by mouth every 7 days        Multiple Vitamin tablet Take 1 tablet by mouth daily      Omega-3 Fatty Acids (FISH OIL CONCENTRATE) 1000 MG CAPS Take 4 capsules by mouth daily      warfarin (COUMADIN) 5 mg tablet TAKE 1 TABLET BY MOUTH  DAILY AS DIRECTED 90 tablet 3    warfarin (COUMADIN) 7 5 mg tablet TAKE 1 TABLET BY MOUTH  DAILY 90 tablet 3     No current facility-administered medications on file prior to visit

## 2019-11-11 ENCOUNTER — APPOINTMENT (OUTPATIENT)
Dept: LAB | Facility: CLINIC | Age: 84
End: 2019-11-11
Payer: MEDICARE

## 2019-11-11 ENCOUNTER — ANTICOAG VISIT (OUTPATIENT)
Dept: CARDIOLOGY CLINIC | Facility: CLINIC | Age: 84
End: 2019-11-11

## 2019-11-11 PROCEDURE — 36415 COLL VENOUS BLD VENIPUNCTURE: CPT | Performed by: INTERNAL MEDICINE

## 2019-11-25 ENCOUNTER — APPOINTMENT (OUTPATIENT)
Dept: LAB | Facility: CLINIC | Age: 84
End: 2019-11-25
Payer: MEDICARE

## 2019-11-25 ENCOUNTER — ANTICOAG VISIT (OUTPATIENT)
Dept: CARDIOLOGY CLINIC | Facility: CLINIC | Age: 84
End: 2019-11-25

## 2019-12-16 ENCOUNTER — APPOINTMENT (OUTPATIENT)
Dept: LAB | Facility: CLINIC | Age: 84
End: 2019-12-16
Payer: MEDICARE

## 2019-12-16 ENCOUNTER — ANTICOAG VISIT (OUTPATIENT)
Dept: CARDIOLOGY CLINIC | Facility: CLINIC | Age: 84
End: 2019-12-16

## 2019-12-20 ENCOUNTER — OFFICE VISIT (OUTPATIENT)
Dept: CARDIOLOGY CLINIC | Facility: CLINIC | Age: 84
End: 2019-12-20
Payer: MEDICARE

## 2019-12-20 VITALS
WEIGHT: 173.4 LBS | BODY MASS INDEX: 25.68 KG/M2 | DIASTOLIC BLOOD PRESSURE: 68 MMHG | SYSTOLIC BLOOD PRESSURE: 160 MMHG | HEIGHT: 69 IN | HEART RATE: 72 BPM

## 2019-12-20 DIAGNOSIS — I48.0 PAROXYSMAL ATRIAL FIBRILLATION (HCC): Primary | ICD-10-CM

## 2019-12-20 DIAGNOSIS — E78.5 DYSLIPIDEMIA: ICD-10-CM

## 2019-12-20 DIAGNOSIS — I10 ESSENTIAL HYPERTENSION: ICD-10-CM

## 2019-12-20 DIAGNOSIS — I44.7 LEFT BUNDLE-BRANCH BLOCK: ICD-10-CM

## 2019-12-20 PROCEDURE — 99214 OFFICE O/P EST MOD 30 MIN: CPT | Performed by: INTERNAL MEDICINE

## 2019-12-20 PROCEDURE — 93000 ELECTROCARDIOGRAM COMPLETE: CPT | Performed by: INTERNAL MEDICINE

## 2019-12-20 NOTE — PROGRESS NOTES
Cardiology Follow Up    Evonne Riley  1/19/1927  [id number]  [address] [facility]  [address] [place] [address] [address]    1  Paroxysmal atrial fibrillation (HCC)  POCT ECG   2  Left bundle-branch block     3  Essential hypertension     4  Dyslipidemia         Discussion/Summary:  He continues to do well since her last visit  He is maintaining sinus rhythm, there has been no recurrence of atrial fibrillation  Will maintain on anticoagulation  Left bundle-branch block remains he has had no syncope will continue surveillance  Blood pressure was elevated in the office today came down on manual recheck  Lipids have been stable follow-up in 1 year  We have chose to do more conservative approach will not order any further testing    Interval History:   54-year-old gentleman with paroxysmal atrial fibrillation currently maintaining sinus rhythm on anticoagulation, hypertension, hyperlipidemia, incomplete left bundle branch block presents for routine scheduled follow-up appointment  he presents for routine yearly follow-up visit  He continues to remain quite active for his age he states he was walking 18 holes of golf not that long ago  He has a good functional capacity denies any chest pain, shortness of breath, palpitations, lightheadedness, dizziness, or syncope  There has been no lower extremity edema, PND, orthopnea      Problem List     Atrial fibrillation ([facility] [address] [address] ) [I48 91]    Arthritis    Overview Signed 3/25/2018 11:55 AM by Sukumar Ortiz MD     Description: DJD         Benign prostatic hyperplasia    Chronic constipation    Diabetes mellitus type II, controlled ([facility] [address] )    Diverticulosis    Overview Signed 3/25/2018 11:55 AM by Sukumar Ortiz MD     Description: C BLEEDING         Dyslipidemia    History of prostate cancer    Hypertension    Impaired fasting glucose    Left bundle-branch block    Overview Signed 3/25/2018 11:55 AM by Ramiro Ogden MD     Description: CHRONIC         Paroxysmal atrial fibrillation (HCC)    Rheumatoid arthritis (HCC)    Seropositive rheumatoid arthritis (HCC)    Spondylosis of cervical region without myelopathy or radiculopathy    Venous insufficiency        Past Medical History:   Diagnosis Date    A-fib ([place] [address] )     Basal cell carcinoma     10/9/15    Malignant neoplasm of prostate ([place] [address] )     10/9/15    Rheumatoid arthritis ([place] [address] )      Social History     Socioeconomic History    Marital status: Single     Spouse name: Not on file    Number of children: Not on file    Years of education: Not on file    Highest education level: Not on file   Occupational History    Occupation: Teaching   Social Needs    Financial resource strain: Not on file    Food insecurity:     Worry: Not on file     Inability: Not on file    Transportation needs:     Medical: Not on file     Non-medical: Not on file   Tobacco Use    Smoking status: Former Smoker    Smokeless tobacco: Former User   Substance and Sexual Activity    Alcohol use: Yes     Comment: wine    Drug use: No    Sexual activity: Not on file   Lifestyle    Physical activity:     Days per week: Not on file     Minutes per session: Not on file    Stress: Not on file   Relationships    Social connections:     Talks on phone: Not on file     Gets together: Not on file     Attends Alevism service: Not on file     Active member of club or organization: Not on file     Attends meetings of clubs or organizations: Not on file     Relationship status: Not on file    Intimate partner violence:     Fear of current or ex partner: Not on file     Emotionally abused: Not on file     Physically abused: Not on file     Forced sexual activity: Not on file   Other Topics Concern    Not on file   Social History Narrative    Not on file      Family History   Problem Relation Age of Onset    Diabetes Mother     Lung cancer Mother     Heart disease Father    Ivanna Marlin Stroke Father         syndrome     Past Surgical History:   Procedure Laterality Date    CATARACT EXTRACTION      2000    COLONOSCOPY N/A 4/30/2019    Procedure: COLONOSCOPY;  Surgeon: Hailey Cortez MD;  Location: [facility];   Service: Colorectal    CORONARY ANGIOPLASTY WITH STENT PLACEMENT      7[facility]    HEMORRHOID SURGERY      HERNIA REPAIR      PROSTATE SURGERY         Current Outpatient Medications:     amLODIPine (NORVASC) 5 mg tablet, TAKE 1 TABLET BY MOUTH  DAILY, Disp: 90 tablet, Rfl: 3    aspirin 81 MG tablet, Take 1 tablet by mouth daily, Disp: , Rfl:     atorvastatin (LIPITOR) 10 mg tablet, TAKE 1 TABLET BY MOUTH AT  BEDTIME, Disp: 90 tablet, Rfl: 3    Cholecalciferol 2000 units CAPS, Take 1 capsule by mouth daily, Disp: , Rfl:     docusate sodium (STOOL SOFTENER) 250 MG capsule, Take 1 capsule by mouth as needed, Disp: , Rfl:     folic acid (FOLVITE) 1 mg tablet, Take 1 mg by mouth daily, Disp: , Rfl:     lisinopril (ZESTRIL) 10 mg tablet, TAKE 1 TABLET BY MOUTH  DAILY, Disp: 90 tablet, Rfl: 3    methotrexate 2 5 mg tablet, Take 2 tablets by mouth every 7 days  , Disp: , Rfl:     Multiple Vitamin tablet, Take 1 tablet by mouth daily, Disp: , Rfl:     Omega-3 Fatty Acids (FISH OIL CONCENTRATE) 1000 MG CAPS, Take 4 capsules by mouth daily, Disp: , Rfl:     warfarin (COUMADIN) 5 mg tablet, TAKE 1 TABLET BY MOUTH  DAILY AS DIRECTED, Disp: 90 tablet, Rfl: 3    warfarin (COUMADIN) 7 5 mg tablet, TAKE 1 TABLET BY MOUTH  DAILY, Disp: 90 tablet, Rfl: 3    ARTIFICIAL TEARS 0 1-0 3 % SOLN, Apply 1 drop to eye 2 (two) times a day, Disp: , Rfl:     bisacodyl (DULCOLAX) 5 mg EC tablet, Take 1 tablet by mouth daily as needed, Disp: , Rfl:     latanoprost (XALATAN) 0 005 % ophthalmic solution, Apply 0 005 % to eye daily, Disp: , Rfl:   No Known Allergies    Labs:     Chemistry        Component Value Date/Time     12/02/2015 0819    K 3 8 10/28/2019 0756    K 3 8 12/02/2015 0819     10/28/2019 0756     (H) 12/02/2015 0819    CO2 27 10/28/2019 0756    CO2 26 12/02/2015 0819    BUN 17 10/28/2019 0756    BUN 17 12/02/2015 0819    CREATININE 0 85 10/28/2019 0756    CREATININE 0 82 12/02/2015 0819        Component Value Date/Time    CALCIUM 8 5 10/28/2019 0756    CALCIUM 8 4 12/02/2015 0819    ALKPHOS 61 10/28/2019 0756    ALKPHOS 68 12/02/2015 0819    AST 18 10/28/2019 0756    AST 14 12/02/2015 0819    ALT 27 10/28/2019 0756    ALT 35 12/02/2015 0819    BILITOT 0 49 12/02/2015 0819            No results found for: CHOL  Lab Results   Component Value Date    HDL 68 (H) 07/24/2019    HDL 65 (H) 09/24/2018    HDL 70 (H) 06/22/2018     Lab Results   Component Value Date    LDLCALC 64 07/24/2019    LDLCALC 78 09/24/2018    LDLCALC 65 06/22/2018     Lab Results   Component Value Date    TRIG 58 07/24/2019    TRIG 62 09/24/2018    TRIG 40 06/22/2018     No results found for: CHOLHDL    Imaging: No results found  ECG:    Sinus rhythm 1st degree AV block nonspecific interventricular conduction delay  ROS    Vitals:    12/20/19 0926   BP: 160/68   Pulse: 72     Vitals:    12/20/19 0926   Weight: 78 7 kg (173 lb 6 4 oz)     Height: 5' 9" (175 3 cm)   Body mass index is 25 61 kg/m²  Physical Exam:  Vital signs reviewed  General:  Alert and cooperative, appears stated age, no acute distress  HEENT:  PERRLA, EOMI, no scleral icterus, no conjunctival pallor  Neck:  No lymphadenopathy, no thyromegaly, no carotid bruits, no elevated JVP  Heart:  Regular rate and rhythm, normal S1/S2, no S3/S4, no murmur, rubs or gallops  PMI nondisplaced  Lungs:  Clear to auscultation bilaterally, no wheezes rales or rhonchi  Abdomen:  Soft, non-tender, positive bowel sounds, no rebound or guarding,   no organomegaly   Extremities:  Normal range of motion    No clubbing, cyanosis or edema   Vascular:  2+ pedal pulses  Skin:  No rashes or lesions on exposed skin  Neurologic:  Cranial nerves II-XII grossly intact without focal deficits

## 2020-01-03 ENCOUNTER — ANTICOAG VISIT (OUTPATIENT)
Dept: CARDIOLOGY CLINIC | Facility: CLINIC | Age: 85
End: 2020-01-03

## 2020-01-03 ENCOUNTER — APPOINTMENT (OUTPATIENT)
Dept: LAB | Facility: CLINIC | Age: 85
End: 2020-01-03
Payer: MEDICARE

## 2020-01-03 DIAGNOSIS — I48.0 PAF (PAROXYSMAL ATRIAL FIBRILLATION) (HCC): Primary | ICD-10-CM

## 2020-01-03 LAB
INR PPP: 2.13 (ref 0.84–1.19)
PROTHROMBIN TIME: 23.3 SECONDS (ref 11.6–14.5)

## 2020-01-03 PROCEDURE — 36415 COLL VENOUS BLD VENIPUNCTURE: CPT

## 2020-01-03 PROCEDURE — 85610 PROTHROMBIN TIME: CPT

## 2020-01-06 ENCOUNTER — TRANSCRIBE ORDERS (OUTPATIENT)
Dept: URGENT CARE | Facility: CLINIC | Age: 85
End: 2020-01-06

## 2020-01-06 ENCOUNTER — APPOINTMENT (OUTPATIENT)
Dept: LAB | Facility: CLINIC | Age: 85
End: 2020-01-06
Payer: MEDICARE

## 2020-01-06 DIAGNOSIS — M06.9 RHEUMATOID ARTHRITIS, INVOLVING UNSPECIFIED SITE, UNSPECIFIED RHEUMATOID FACTOR PRESENCE: Primary | ICD-10-CM

## 2020-01-06 DIAGNOSIS — M06.9 RHEUMATOID ARTHRITIS, INVOLVING UNSPECIFIED SITE, UNSPECIFIED RHEUMATOID FACTOR PRESENCE: ICD-10-CM

## 2020-01-06 LAB
ALBUMIN SERPL BCP-MCNC: 3.2 G/DL (ref 3.5–5)
ALP SERPL-CCNC: 64 U/L (ref 46–116)
ALT SERPL W P-5'-P-CCNC: 27 U/L (ref 12–78)
ANION GAP SERPL CALCULATED.3IONS-SCNC: 5 MMOL/L (ref 4–13)
AST SERPL W P-5'-P-CCNC: 18 U/L (ref 5–45)
BASOPHILS # BLD AUTO: 0.02 THOUSANDS/ΜL (ref 0–0.1)
BASOPHILS NFR BLD AUTO: 0 % (ref 0–1)
BILIRUB SERPL-MCNC: 0.43 MG/DL (ref 0.2–1)
BUN SERPL-MCNC: 16 MG/DL (ref 5–25)
CALCIUM SERPL-MCNC: 8.7 MG/DL (ref 8.3–10.1)
CHLORIDE SERPL-SCNC: 108 MMOL/L (ref 100–108)
CO2 SERPL-SCNC: 27 MMOL/L (ref 21–32)
CREAT SERPL-MCNC: 0.86 MG/DL (ref 0.6–1.3)
EOSINOPHIL # BLD AUTO: 0.12 THOUSAND/ΜL (ref 0–0.61)
EOSINOPHIL NFR BLD AUTO: 2 % (ref 0–6)
ERYTHROCYTE [DISTWIDTH] IN BLOOD BY AUTOMATED COUNT: 12.7 % (ref 11.6–15.1)
GFR SERPL CREATININE-BSD FRML MDRD: 75 ML/MIN/1.73SQ M
GLUCOSE P FAST SERPL-MCNC: 125 MG/DL (ref 65–99)
HCT VFR BLD AUTO: 40.3 % (ref 36.5–49.3)
HGB BLD-MCNC: 13.2 G/DL (ref 12–17)
IMM GRANULOCYTES # BLD AUTO: 0.02 THOUSAND/UL (ref 0–0.2)
IMM GRANULOCYTES NFR BLD AUTO: 0 % (ref 0–2)
LYMPHOCYTES # BLD AUTO: 1.14 THOUSANDS/ΜL (ref 0.6–4.47)
LYMPHOCYTES NFR BLD AUTO: 22 % (ref 14–44)
MCH RBC QN AUTO: 32.4 PG (ref 26.8–34.3)
MCHC RBC AUTO-ENTMCNC: 32.8 G/DL (ref 31.4–37.4)
MCV RBC AUTO: 99 FL (ref 82–98)
MONOCYTES # BLD AUTO: 0.43 THOUSAND/ΜL (ref 0.17–1.22)
MONOCYTES NFR BLD AUTO: 8 % (ref 4–12)
NEUTROPHILS # BLD AUTO: 3.47 THOUSANDS/ΜL (ref 1.85–7.62)
NEUTS SEG NFR BLD AUTO: 68 % (ref 43–75)
NRBC BLD AUTO-RTO: 0 /100 WBCS
PLATELET # BLD AUTO: 232 THOUSANDS/UL (ref 149–390)
PMV BLD AUTO: 8.9 FL (ref 8.9–12.7)
POTASSIUM SERPL-SCNC: 3.7 MMOL/L (ref 3.5–5.3)
PROT SERPL-MCNC: 6.6 G/DL (ref 6.4–8.2)
RBC # BLD AUTO: 4.07 MILLION/UL (ref 3.88–5.62)
SODIUM SERPL-SCNC: 140 MMOL/L (ref 136–145)
WBC # BLD AUTO: 5.2 THOUSAND/UL (ref 4.31–10.16)

## 2020-01-06 PROCEDURE — 80053 COMPREHEN METABOLIC PANEL: CPT

## 2020-01-06 PROCEDURE — 36415 COLL VENOUS BLD VENIPUNCTURE: CPT | Performed by: INTERNAL MEDICINE

## 2020-01-06 PROCEDURE — 85025 COMPLETE CBC W/AUTO DIFF WBC: CPT | Performed by: INTERNAL MEDICINE

## 2020-01-27 ENCOUNTER — ANTICOAG VISIT (OUTPATIENT)
Dept: CARDIOLOGY CLINIC | Facility: CLINIC | Age: 85
End: 2020-01-27

## 2020-01-27 ENCOUNTER — APPOINTMENT (OUTPATIENT)
Dept: LAB | Facility: CLINIC | Age: 85
End: 2020-01-27
Payer: MEDICARE

## 2020-02-10 ENCOUNTER — ANTICOAG VISIT (OUTPATIENT)
Dept: CARDIOLOGY CLINIC | Facility: CLINIC | Age: 85
End: 2020-02-10

## 2020-02-10 ENCOUNTER — TRANSCRIBE ORDERS (OUTPATIENT)
Dept: URGENT CARE | Facility: CLINIC | Age: 85
End: 2020-02-10

## 2020-02-10 ENCOUNTER — APPOINTMENT (OUTPATIENT)
Dept: LAB | Facility: CLINIC | Age: 85
End: 2020-02-10
Payer: MEDICARE

## 2020-02-10 DIAGNOSIS — E13.9 DIABETES MELLITUS OF OTHER TYPE WITHOUT COMPLICATION, UNSPECIFIED WHETHER LONG TERM INSULIN USE (HCC): ICD-10-CM

## 2020-02-10 DIAGNOSIS — E78.5 DYSLIPIDEMIA: ICD-10-CM

## 2020-02-10 DIAGNOSIS — E13.9 DIABETES MELLITUS OF OTHER TYPE WITHOUT COMPLICATION, UNSPECIFIED WHETHER LONG TERM INSULIN USE (HCC): Primary | ICD-10-CM

## 2020-02-10 LAB
ALT SERPL W P-5'-P-CCNC: 24 U/L (ref 12–78)
ANION GAP SERPL CALCULATED.3IONS-SCNC: 4 MMOL/L (ref 4–13)
BUN SERPL-MCNC: 18 MG/DL (ref 5–25)
CALCIUM SERPL-MCNC: 8.6 MG/DL (ref 8.3–10.1)
CHLORIDE SERPL-SCNC: 108 MMOL/L (ref 100–108)
CHOLEST SERPL-MCNC: 152 MG/DL (ref 50–200)
CO2 SERPL-SCNC: 28 MMOL/L (ref 21–32)
CREAT SERPL-MCNC: 0.79 MG/DL (ref 0.6–1.3)
EST. AVERAGE GLUCOSE BLD GHB EST-MCNC: 146 MG/DL
GFR SERPL CREATININE-BSD FRML MDRD: 77 ML/MIN/1.73SQ M
GLUCOSE P FAST SERPL-MCNC: 125 MG/DL (ref 65–99)
HBA1C MFR BLD: 6.7 % (ref 4.2–6.3)
HDLC SERPL-MCNC: 72 MG/DL
LDLC SERPL CALC-MCNC: 70 MG/DL (ref 0–100)
LDLC SERPL DIRECT ASSAY-MCNC: 68 MG/DL (ref 0–100)
NONHDLC SERPL-MCNC: 80 MG/DL
POTASSIUM SERPL-SCNC: 3.8 MMOL/L (ref 3.5–5.3)
SODIUM SERPL-SCNC: 140 MMOL/L (ref 136–145)
TRIGL SERPL-MCNC: 50 MG/DL

## 2020-02-10 PROCEDURE — 80048 BASIC METABOLIC PNL TOTAL CA: CPT

## 2020-02-10 PROCEDURE — 83721 ASSAY OF BLOOD LIPOPROTEIN: CPT

## 2020-02-10 PROCEDURE — 80061 LIPID PANEL: CPT

## 2020-02-10 PROCEDURE — 83036 HEMOGLOBIN GLYCOSYLATED A1C: CPT

## 2020-02-10 PROCEDURE — 84460 ALANINE AMINO (ALT) (SGPT): CPT

## 2020-02-13 ENCOUNTER — OFFICE VISIT (OUTPATIENT)
Dept: INTERNAL MEDICINE CLINIC | Facility: CLINIC | Age: 85
End: 2020-02-13
Payer: MEDICARE

## 2020-02-13 VITALS
HEART RATE: 72 BPM | BODY MASS INDEX: 25.89 KG/M2 | SYSTOLIC BLOOD PRESSURE: 160 MMHG | HEIGHT: 69 IN | DIASTOLIC BLOOD PRESSURE: 80 MMHG | WEIGHT: 174.8 LBS | OXYGEN SATURATION: 98 %

## 2020-02-13 DIAGNOSIS — Z00.00 MEDICARE ANNUAL WELLNESS VISIT, SUBSEQUENT: Primary | ICD-10-CM

## 2020-02-13 PROCEDURE — G0438 PPPS, INITIAL VISIT: HCPCS | Performed by: INTERNAL MEDICINE

## 2020-02-13 PROCEDURE — 1170F FXNL STATUS ASSESSED: CPT | Performed by: INTERNAL MEDICINE

## 2020-02-13 PROCEDURE — 4040F PNEUMOC VAC/ADMIN/RCVD: CPT | Performed by: INTERNAL MEDICINE

## 2020-02-13 PROCEDURE — 3044F HG A1C LEVEL LT 7.0%: CPT | Performed by: INTERNAL MEDICINE

## 2020-02-13 PROCEDURE — 3008F BODY MASS INDEX DOCD: CPT | Performed by: INTERNAL MEDICINE

## 2020-02-13 PROCEDURE — 3079F DIAST BP 80-89 MM HG: CPT | Performed by: INTERNAL MEDICINE

## 2020-02-13 PROCEDURE — 1123F ACP DISCUSS/DSCN MKR DOCD: CPT | Performed by: INTERNAL MEDICINE

## 2020-02-13 PROCEDURE — 1036F TOBACCO NON-USER: CPT | Performed by: INTERNAL MEDICINE

## 2020-02-13 PROCEDURE — 3077F SYST BP >= 140 MM HG: CPT | Performed by: INTERNAL MEDICINE

## 2020-02-13 PROCEDURE — 1160F RVW MEDS BY RX/DR IN RCRD: CPT | Performed by: INTERNAL MEDICINE

## 2020-02-13 PROCEDURE — 1125F AMNT PAIN NOTED PAIN PRSNT: CPT | Performed by: INTERNAL MEDICINE

## 2020-02-13 NOTE — PROGRESS NOTES
Assessment/Plan:     Diagnoses and all orders for this visit:    Medicare annual wellness visit, subsequent          Subjective:      Patient ID: Kaiser Maddox is a 80 y o  male  The following portions of the patient's history were reviewed and updated as appropriate: allergies, current medications, past family history, past medical history, past social history, past surgical history and problem list   HPI  He is doing remarkably well with a full an active life, up-to-date on visits with rheumatology and cardiology for rheumatoid arthritis and paroxysmal atrial fibrillation  He is considering switching to Eliquis from Coumadin  Labs from January 29th reviewed and are reflective of excellent glycemic control for diabetes with hemoglobin A1c of 6 7, superior kidney function, and likewise hyperlipidemia is also well controlled  He would like to see a new podiatrist for nail care as his old podiatrist retired and Dr Steph Bonilla was recommended  He will make his own of cough appointment  He reports no new problems  Actively playing golf, and walking around a local college campus where he is still academically active, he has excellent exercise tolerance including no chest pain or shortness of breath  We discussed follow-up in 3 months with Dr Michael Doshi and call 2 weeks prior to this visit for any questions or problems        Review of Systems      Objective:      /80 (BP Location: Left arm, Patient Position: Sitting, Cuff Size: Standard)   Pulse 72   Ht 5' 9" (1 753 m)   Wt 79 3 kg (174 lb 12 8 oz)   SpO2 98%   BMI 25 81 kg/m²      Wt Readings from Last 3 Encounters:   02/13/20 79 3 kg (174 lb 12 8 oz)   12/20/19 78 7 kg (173 lb 6 4 oz)   10/31/19 78 2 kg (172 lb 6 4 oz)     Temp Readings from Last 3 Encounters:   07/29/19 99 8 °F (37 7 °C) (Tympanic)   05/13/19 97 8 °F (36 6 °C) (Tympanic)   04/30/19 97 7 °F (36 5 °C) (Tympanic)     BP Readings from Last 3 Encounters:   02/13/20 160/80 12/20/19 160/68   10/31/19 138/80     Pulse Readings from Last 3 Encounters:   02/13/20 72   12/20/19 72   10/31/19 73        Physical Exam    Vital signs stable, appearing much younger than stated age with intact cognitive status, optimal mood, excellent strength and balance  Hearing well compensated with hearing aids  HEENT exam otherwise unremarkable  No JVD  No carotid bruits  Lungs clear and cardiac exam is normal on auscultation  Peripheral circulation is intact  There is trace edema of lower legs with good skin integrity  There is no active joint inflammation and joint function is well preserved as his flexibility and balance  Skin without pallor or icterus    Exam of feet shows 2/2 posterior tibial and dorsalis pedis pulses, normal skin integrity and sensation, with mycotic toenail changes  Patient Active Problem List   Diagnosis    Benign prostatic hyperplasia    Chronic constipation    Diabetes mellitus type II, controlled (HCC)    Diverticulosis    Dyslipidemia    History of prostate cancer    Hypertension    Left bundle-branch block    Paroxysmal atrial fibrillation (HCC)    Seropositive rheumatoid arthritis (HCC)    Spondylosis of cervical region without myelopathy or radiculopathy    Venous insufficiency    Primary osteoarthritis involving multiple joints    Chronic gout involving toe of right foot without tophus       Current Outpatient Medications on File Prior to Visit   Medication Sig Dispense Refill    amLODIPine (NORVASC) 5 mg tablet TAKE 1 TABLET BY MOUTH  DAILY 90 tablet 3    ARTIFICIAL TEARS 0 1-0 3 % SOLN Apply 1 drop to eye 2 (two) times a day      aspirin 81 MG tablet Take 1 tablet by mouth daily      atorvastatin (LIPITOR) 10 mg tablet TAKE 1 TABLET BY MOUTH AT  BEDTIME 90 tablet 3    bisacodyl (DULCOLAX) 5 mg EC tablet Take 1 tablet by mouth daily as needed      Cholecalciferol 2000 units CAPS Take 1 capsule by mouth daily      docusate sodium (STOOL SOFTENER) 250 MG capsule Take 1 capsule by mouth as needed      folic acid (FOLVITE) 1 mg tablet Take 1 mg by mouth daily      latanoprost (XALATAN) 0 005 % ophthalmic solution Apply 0 005 % to eye daily      lisinopril (ZESTRIL) 10 mg tablet TAKE 1 TABLET BY MOUTH  DAILY 90 tablet 3    methotrexate 2 5 mg tablet Take 2 tablets by mouth every 7 days        methotrexate 2 5 mg tablet Take 2 5 mg by mouth every 7 days      Multiple Vitamin tablet Take 1 tablet by mouth daily      Omega-3 Fatty Acids (FISH OIL CONCENTRATE) 1000 MG CAPS Take 4 capsules by mouth daily      warfarin (COUMADIN) 5 mg tablet TAKE 1 TABLET BY MOUTH  DAILY AS DIRECTED 90 tablet 3    warfarin (COUMADIN) 7 5 mg tablet TAKE 1 TABLET BY MOUTH  DAILY 90 tablet 3     No current facility-administered medications on file prior to visit

## 2020-02-13 NOTE — PROGRESS NOTES
Assessment and Plan:     Problem List Items Addressed This Visit     None      Visit Diagnoses     Medicare annual wellness visit, subsequent    -  Primary           Preventive health issues were discussed with patient, and age appropriate screening tests were ordered as noted in patient's After Visit Summary  Personalized health advice and appropriate referrals for health education or preventive services given if needed, as noted in patient's After Visit Summary  History of Present Illness:     Patient presents for Medicare Annual Wellness visit    Patient Care Team:  Ramiro Ogden MD as PCP - Hector Carrillo, DO Jc Willis MD as Endoscopist     Problem List:     Patient Active Problem List   Diagnosis    Benign prostatic hyperplasia    Chronic constipation    Diabetes mellitus type II, controlled ([place] [address] )    Diverticulosis    Dyslipidemia    History of prostate cancer    Hypertension    Left bundle-branch block    Paroxysmal atrial fibrillation ([place] [address] )    Seropositive rheumatoid arthritis ([place] [address] )    Spondylosis of cervical region without myelopathy or radiculopathy    Venous insufficiency    Primary osteoarthritis involving multiple joints    Chronic gout involving toe of right foot without tophus      Past Medical and Surgical History:     Past Medical History:   Diagnosis Date    A-fib ([place] [address] )     Basal cell carcinoma     10/9/15    Malignant neoplasm of prostate ([place] [address] )     10/9/15    Rheumatoid arthritis ([place] [address] )      Past Surgical History:   Procedure Laterality Date    CATARACT EXTRACTION      2000    COLONOSCOPY N/A 4/30/2019    Procedure: COLONOSCOPY;  Surgeon: Jc Carpenter MD;  Location: [facility];   Service: Colorectal    CORONARY ANGIOPLASTY WITH STENT PLACEMENT      7/2011, [facility]    HEMORRHOID SURGERY      HERNIA REPAIR      PROSTATE SURGERY        Family History:     Family History   Problem Relation Age of Onset    Diabetes Mother     Lung cancer Mother     Heart disease Father     Stroke Father         syndrome      Social History:        Social History     Socioeconomic History    Marital status: Single     Spouse name: None    Number of children: None    Years of education: None    Highest education level: None   Occupational History    Occupation: Teaching   Social Needs    Financial resource strain: None    Food insecurity:     Worry: None     Inability: None    Transportation needs:     Medical: None     Non-medical: None   Tobacco Use    Smoking status: Former Smoker    Smokeless tobacco: Former User   Substance and Sexual Activity    Alcohol use: Yes     Comment: wine    Drug use: No    Sexual activity: None   Lifestyle    Physical activity:     Days per week: None     Minutes per session: None    Stress: None   Relationships    Social connections:     Talks on phone: None     Gets together: None     Attends Rastafarian service: None     Active member of club or organization: None     Attends meetings of clubs or organizations: None     Relationship status: None    Intimate partner violence:     Fear of current or ex partner: None     Emotionally abused: None     Physically abused: None     Forced sexual activity: None   Other Topics Concern    None   Social History Narrative    None      Medications and Allergies:     Current Outpatient Medications   Medication Sig Dispense Refill    amLODIPine (NORVASC) 5 mg tablet TAKE 1 TABLET BY MOUTH  DAILY 90 tablet 3    ARTIFICIAL TEARS 0 1-0 3 % SOLN Apply 1 drop to eye 2 (two) times a day      aspirin 81 MG tablet Take 1 tablet by mouth daily      atorvastatin (LIPITOR) 10 mg tablet TAKE 1 TABLET BY MOUTH AT  BEDTIME 90 tablet 3    bisacodyl (DULCOLAX) 5 mg EC tablet Take 1 tablet by mouth daily as needed      Cholecalciferol 2000 units CAPS Take 1 capsule by mouth daily      docusate sodium (STOOL SOFTENER) 250 MG capsule Take 1 capsule by mouth as needed  folic acid (FOLVITE) 1 mg tablet Take 1 mg by mouth daily      latanoprost (XALATAN) 0 005 % ophthalmic solution Apply 0 005 % to eye daily      lisinopril (ZESTRIL) 10 mg tablet TAKE 1 TABLET BY MOUTH  DAILY 90 tablet 3    methotrexate 2 5 mg tablet Take 2 tablets by mouth every 7 days        methotrexate 2 5 mg tablet Take 2 5 mg by mouth every 7 days      Multiple Vitamin tablet Take 1 tablet by mouth daily      Omega-3 Fatty Acids (FISH OIL CONCENTRATE) 1000 MG CAPS Take 4 capsules by mouth daily      warfarin (COUMADIN) 5 mg tablet TAKE 1 TABLET BY MOUTH  DAILY AS DIRECTED 90 tablet 3    warfarin (COUMADIN) 7 5 mg tablet TAKE 1 TABLET BY MOUTH  DAILY 90 tablet 3     No current facility-administered medications for this visit  No Known Allergies   Immunizations:     Immunization History   Administered Date(s) Administered    INFLUENZA 10/13/2006, 09/18/2009, 10/16/2013, 01/05/2015, 10/26/2016, 10/08/2017    Influenza Split High Dose Preservative Free IM 10/01/2012, 10/24/2014, 10/09/2015, 11/01/2016, 10/01/2017    Influenza, high dose seasonal 0 5 mL 10/11/2018    Pneumococcal Conjugate 13-Valent 10/09/2015    Pneumococcal Polysaccharide PPV23 10/01/2008    Tdap 08/28/2017    influenza, trivalent, adjuvanted 09/17/2019      Health Maintenance: There are no preventive care reminders to display for this patient  There are no preventive care reminders to display for this patient  Medicare Health Risk Assessment:     /80 (BP Location: Left arm, Patient Position: Sitting, Cuff Size: Standard)   Pulse 72   Ht 5' 9" (1 753 m)   Wt 79 3 kg (174 lb 12 8 oz)   SpO2 98%   BMI 25 81 kg/m²          Health Risk Assessment:   Patient rates overall health as good  Patient feels that their physical health rating is same  Eyesight was rated as same  Hearing was rated as same  Patient feels that their emotional and mental health rating is same   Pain experienced in the last 7 days has been none  Patient states that he has experienced no weight loss or gain in last 6 months  Depression Screening:   PHQ-2 Score: 0      Fall Risk Screening: In the past year, patient has experienced: no history of falling in past year      Home Safety:  Patient does not have trouble with stairs inside or outside of their home  Patient has working smoke alarms and has no working carbon monoxide detector  Nutrition:   Current diet is Regular  Medications:   Patient is currently taking over-the-counter supplements  OTC medications include: see medication list  Patient is able to manage medications  Activities of Daily Living (ADLs)/Instrumental Activities of Daily Living (IADLs):   Walk and transfer into and out of bed and chair?: Yes  Dress and groom yourself?: Yes    Bathe or shower yourself?: Yes    Feed yourself? Yes  Do your laundry/housekeeping?: Yes  Manage your money, pay your bills and track your expenses?: Yes  Make your own meals?: Yes    Do your own shopping?: Yes    Previous Hospitalizations:   Any hospitalizations or ED visits within the last 12 months?: Yes    How many hospitalizations have you had in the last year?: 1-2    Advance Care Planning:   Living will: Yes    Durable POA for healthcare:  Yes    Advanced directive: Yes      Cognitive Screening:   Provider or family/friend/caregiver concerned regarding cognition?: No    PREVENTIVE SCREENINGS      Cardiovascular Screening:    General: Screening Not Indicated and History Lipid Disorder      Diabetes Screening:     General: Screening Not Indicated and History Diabetes      Colorectal Cancer Screening:     General: Screening Not Indicated      Prostate Cancer Screening:    General: History Prostate Cancer and Screening Not Indicated      Abdominal Aortic Aneurysm (AAA) Screening:    Risk factors include: tobacco use        Dereck Fuller MD

## 2020-02-13 NOTE — PATIENT INSTRUCTIONS

## 2020-02-24 ENCOUNTER — ANTICOAG VISIT (OUTPATIENT)
Dept: CARDIOLOGY CLINIC | Facility: CLINIC | Age: 85
End: 2020-02-24

## 2020-02-24 ENCOUNTER — APPOINTMENT (OUTPATIENT)
Dept: LAB | Facility: CLINIC | Age: 85
End: 2020-02-24
Payer: MEDICARE

## 2020-03-09 ENCOUNTER — ANTICOAG VISIT (OUTPATIENT)
Dept: CARDIOLOGY CLINIC | Facility: CLINIC | Age: 85
End: 2020-03-09

## 2020-03-09 ENCOUNTER — APPOINTMENT (OUTPATIENT)
Dept: LAB | Facility: CLINIC | Age: 85
End: 2020-03-09
Payer: MEDICARE

## 2020-03-24 ENCOUNTER — TRANSCRIBE ORDERS (OUTPATIENT)
Dept: ADMINISTRATIVE | Facility: HOSPITAL | Age: 85
End: 2020-03-24

## 2020-03-24 ENCOUNTER — ANTICOAG VISIT (OUTPATIENT)
Dept: CARDIOLOGY CLINIC | Facility: CLINIC | Age: 85
End: 2020-03-24

## 2020-03-24 ENCOUNTER — APPOINTMENT (OUTPATIENT)
Dept: LAB | Facility: HOSPITAL | Age: 85
End: 2020-03-24
Attending: INTERNAL MEDICINE
Payer: MEDICARE

## 2020-03-24 DIAGNOSIS — C61 MALIGNANT NEOPLASM OF PROSTATE (HCC): ICD-10-CM

## 2020-03-24 DIAGNOSIS — M05.711 RHEUMATOID ARTHRITIS INVOLVING RIGHT SHOULDER WITH POSITIVE RHEUMATOID FACTOR (HCC): ICD-10-CM

## 2020-03-24 DIAGNOSIS — M05.711 RHEUMATOID ARTHRITIS INVOLVING RIGHT SHOULDER WITH POSITIVE RHEUMATOID FACTOR (HCC): Primary | ICD-10-CM

## 2020-03-24 LAB
ALBUMIN SERPL BCP-MCNC: 3 G/DL (ref 3.5–5)
ALP SERPL-CCNC: 65 U/L (ref 46–116)
ALT SERPL W P-5'-P-CCNC: 21 U/L (ref 12–78)
ANION GAP SERPL CALCULATED.3IONS-SCNC: 5 MMOL/L (ref 4–13)
AST SERPL W P-5'-P-CCNC: 20 U/L (ref 5–45)
BASOPHILS # BLD AUTO: 0.01 THOUSANDS/ΜL (ref 0–0.1)
BASOPHILS NFR BLD AUTO: 0 % (ref 0–1)
BILIRUB SERPL-MCNC: 0.3 MG/DL (ref 0.2–1)
BUN SERPL-MCNC: 18 MG/DL (ref 5–25)
CALCIUM SERPL-MCNC: 8.3 MG/DL (ref 8.3–10.1)
CHLORIDE SERPL-SCNC: 106 MMOL/L (ref 100–108)
CO2 SERPL-SCNC: 28 MMOL/L (ref 21–32)
CREAT SERPL-MCNC: 0.76 MG/DL (ref 0.6–1.3)
EOSINOPHIL # BLD AUTO: 0.06 THOUSAND/ΜL (ref 0–0.61)
EOSINOPHIL NFR BLD AUTO: 1 % (ref 0–6)
ERYTHROCYTE [DISTWIDTH] IN BLOOD BY AUTOMATED COUNT: 12.3 % (ref 11.6–15.1)
GFR SERPL CREATININE-BSD FRML MDRD: 79 ML/MIN/1.73SQ M
GLUCOSE SERPL-MCNC: 124 MG/DL (ref 65–140)
HCT VFR BLD AUTO: 38 % (ref 36.5–49.3)
HGB BLD-MCNC: 12.8 G/DL (ref 12–17)
IMM GRANULOCYTES # BLD AUTO: 0.03 THOUSAND/UL (ref 0–0.2)
IMM GRANULOCYTES NFR BLD AUTO: 1 % (ref 0–2)
LYMPHOCYTES # BLD AUTO: 1.2 THOUSANDS/ΜL (ref 0.6–4.47)
LYMPHOCYTES NFR BLD AUTO: 21 % (ref 14–44)
MCH RBC QN AUTO: 32.1 PG (ref 26.8–34.3)
MCHC RBC AUTO-ENTMCNC: 33.7 G/DL (ref 31.4–37.4)
MCV RBC AUTO: 95 FL (ref 82–98)
MONOCYTES # BLD AUTO: 0.48 THOUSAND/ΜL (ref 0.17–1.22)
MONOCYTES NFR BLD AUTO: 8 % (ref 4–12)
NEUTROPHILS # BLD AUTO: 3.91 THOUSANDS/ΜL (ref 1.85–7.62)
NEUTS SEG NFR BLD AUTO: 69 % (ref 43–75)
NRBC BLD AUTO-RTO: 0 /100 WBCS
PLATELET # BLD AUTO: 196 THOUSANDS/UL (ref 149–390)
PMV BLD AUTO: 8.6 FL (ref 8.9–12.7)
POTASSIUM SERPL-SCNC: 3.8 MMOL/L (ref 3.5–5.3)
PROT SERPL-MCNC: 6.4 G/DL (ref 6.4–8.2)
PSA SERPL-MCNC: 0.3 NG/ML (ref 0–4)
RBC # BLD AUTO: 3.99 MILLION/UL (ref 3.88–5.62)
SODIUM SERPL-SCNC: 139 MMOL/L (ref 136–145)
WBC # BLD AUTO: 5.69 THOUSAND/UL (ref 4.31–10.16)

## 2020-03-24 PROCEDURE — 84153 ASSAY OF PSA TOTAL: CPT

## 2020-03-24 PROCEDURE — 85025 COMPLETE CBC W/AUTO DIFF WBC: CPT

## 2020-03-24 PROCEDURE — 80053 COMPREHEN METABOLIC PANEL: CPT

## 2020-04-12 DIAGNOSIS — I10 ESSENTIAL HYPERTENSION: ICD-10-CM

## 2020-04-12 DIAGNOSIS — E78.01 FAMILIAL HYPERCHOLESTEROLEMIA: ICD-10-CM

## 2020-04-14 ENCOUNTER — ANTICOAG VISIT (OUTPATIENT)
Dept: CARDIOLOGY CLINIC | Facility: CLINIC | Age: 85
End: 2020-04-14

## 2020-04-14 ENCOUNTER — APPOINTMENT (OUTPATIENT)
Dept: LAB | Facility: HOSPITAL | Age: 85
End: 2020-04-14
Attending: INTERNAL MEDICINE
Payer: MEDICARE

## 2020-04-29 DIAGNOSIS — E78.01 FAMILIAL HYPERCHOLESTEROLEMIA: ICD-10-CM

## 2020-04-29 DIAGNOSIS — I10 ESSENTIAL HYPERTENSION: ICD-10-CM

## 2020-04-29 RX ORDER — LISINOPRIL 10 MG/1
10 TABLET ORAL DAILY
Qty: 90 TABLET | Refills: 1 | Status: SHIPPED | OUTPATIENT
Start: 2020-04-29 | End: 2020-09-02

## 2020-04-29 RX ORDER — ATORVASTATIN CALCIUM 10 MG/1
10 TABLET, FILM COATED ORAL
Qty: 90 TABLET | Refills: 1 | Status: SHIPPED | OUTPATIENT
Start: 2020-04-29 | End: 2020-09-04

## 2020-05-14 ENCOUNTER — APPOINTMENT (OUTPATIENT)
Dept: LAB | Facility: HOSPITAL | Age: 85
End: 2020-05-14
Attending: INTERNAL MEDICINE
Payer: MEDICARE

## 2020-05-14 ENCOUNTER — TELEMEDICINE (OUTPATIENT)
Dept: INTERNAL MEDICINE CLINIC | Facility: CLINIC | Age: 85
End: 2020-05-14
Payer: MEDICARE

## 2020-05-14 ENCOUNTER — ANTICOAG VISIT (OUTPATIENT)
Dept: CARDIOLOGY CLINIC | Facility: CLINIC | Age: 85
End: 2020-05-14

## 2020-05-14 VITALS — SYSTOLIC BLOOD PRESSURE: 117 MMHG | DIASTOLIC BLOOD PRESSURE: 54 MMHG | HEART RATE: 100 BPM

## 2020-05-14 DIAGNOSIS — E55.9 VITAMIN D DEFICIENCY: ICD-10-CM

## 2020-05-14 DIAGNOSIS — E78.5 DYSLIPIDEMIA: ICD-10-CM

## 2020-05-14 DIAGNOSIS — E11.9 CONTROLLED TYPE 2 DIABETES MELLITUS WITHOUT COMPLICATION, WITHOUT LONG-TERM CURRENT USE OF INSULIN (HCC): Primary | ICD-10-CM

## 2020-05-14 DIAGNOSIS — I10 ESSENTIAL HYPERTENSION: ICD-10-CM

## 2020-05-14 DIAGNOSIS — K59.09 CHRONIC CONSTIPATION: ICD-10-CM

## 2020-05-14 DIAGNOSIS — M05.9 SEROPOSITIVE RHEUMATOID ARTHRITIS (HCC): ICD-10-CM

## 2020-05-14 DIAGNOSIS — I48.0 PAROXYSMAL ATRIAL FIBRILLATION (HCC): ICD-10-CM

## 2020-05-14 PROCEDURE — 99214 OFFICE O/P EST MOD 30 MIN: CPT | Performed by: INTERNAL MEDICINE

## 2020-05-20 RX ORDER — LISINOPRIL 10 MG/1
TABLET ORAL DAILY
Qty: 90 TABLET | Refills: 3 | OUTPATIENT
Start: 2020-05-20

## 2020-05-20 RX ORDER — AMLODIPINE BESYLATE 5 MG/1
TABLET ORAL DAILY
Qty: 90 TABLET | Refills: 3 | OUTPATIENT
Start: 2020-05-20

## 2020-05-20 RX ORDER — ATORVASTATIN CALCIUM 10 MG/1
TABLET, FILM COATED ORAL
Qty: 90 TABLET | Refills: 3 | OUTPATIENT
Start: 2020-05-20

## 2020-05-22 ENCOUNTER — TELEPHONE (OUTPATIENT)
Dept: CARDIOLOGY CLINIC | Facility: CLINIC | Age: 85
End: 2020-05-22

## 2020-05-22 ENCOUNTER — APPOINTMENT (OUTPATIENT)
Dept: LAB | Facility: HOSPITAL | Age: 85
End: 2020-05-22
Attending: INTERNAL MEDICINE
Payer: MEDICARE

## 2020-05-22 ENCOUNTER — ANTICOAG VISIT (OUTPATIENT)
Dept: CARDIOLOGY CLINIC | Facility: CLINIC | Age: 85
End: 2020-05-22

## 2020-05-29 ENCOUNTER — TRANSCRIBE ORDERS (OUTPATIENT)
Dept: ADMINISTRATIVE | Facility: HOSPITAL | Age: 85
End: 2020-05-29

## 2020-05-29 ENCOUNTER — APPOINTMENT (OUTPATIENT)
Dept: LAB | Facility: HOSPITAL | Age: 85
End: 2020-05-29
Attending: INTERNAL MEDICINE
Payer: MEDICARE

## 2020-05-29 ENCOUNTER — ANTICOAG VISIT (OUTPATIENT)
Dept: CARDIOLOGY CLINIC | Facility: CLINIC | Age: 85
End: 2020-05-29

## 2020-05-29 DIAGNOSIS — R60.9 EDEMA, UNSPECIFIED TYPE: ICD-10-CM

## 2020-05-29 DIAGNOSIS — I48.91 ATRIAL FIBRILLATION, UNSPECIFIED TYPE (HCC): ICD-10-CM

## 2020-05-29 DIAGNOSIS — M05.9 JUVENILE SEROPOSITIVE ARTHRITIS (HCC): Primary | ICD-10-CM

## 2020-05-29 DIAGNOSIS — Z79.01 LONG TERM (CURRENT) USE OF ANTICOAGULANTS: ICD-10-CM

## 2020-05-29 DIAGNOSIS — M05.9 JUVENILE SEROPOSITIVE ARTHRITIS (HCC): ICD-10-CM

## 2020-05-29 DIAGNOSIS — K59.00 CONSTIPATION, UNSPECIFIED CONSTIPATION TYPE: ICD-10-CM

## 2020-05-29 LAB
25(OH)D3 SERPL-MCNC: 48 NG/ML (ref 30–100)
ALBUMIN SERPL BCP-MCNC: 2.3 G/DL (ref 3.5–5)
ALP SERPL-CCNC: 62 U/L (ref 46–116)
ALT SERPL W P-5'-P-CCNC: 20 U/L (ref 12–78)
AMORPH PHOS CRY URNS QL MICRO: ABNORMAL /HPF
ANION GAP SERPL CALCULATED.3IONS-SCNC: 8 MMOL/L (ref 4–13)
AST SERPL W P-5'-P-CCNC: 14 U/L (ref 5–45)
BACTERIA UR QL AUTO: ABNORMAL /HPF
BASOPHILS # BLD AUTO: 0.01 THOUSANDS/ΜL (ref 0–0.1)
BASOPHILS NFR BLD AUTO: 0 % (ref 0–1)
BILIRUB SERPL-MCNC: 0.42 MG/DL (ref 0.2–1)
BILIRUB UR QL STRIP: NEGATIVE
BUN SERPL-MCNC: 19 MG/DL (ref 5–25)
CALCIUM SERPL-MCNC: 8.4 MG/DL (ref 8.3–10.1)
CHLORIDE SERPL-SCNC: 104 MMOL/L (ref 100–108)
CHOLEST SERPL-MCNC: 120 MG/DL (ref 50–200)
CK SERPL-CCNC: 47 U/L (ref 39–308)
CLARITY UR: ABNORMAL
CO2 SERPL-SCNC: 24 MMOL/L (ref 21–32)
COLOR UR: YELLOW
CREAT SERPL-MCNC: 0.82 MG/DL (ref 0.6–1.3)
CRP SERPL QL: 78.1 MG/L
EOSINOPHIL # BLD AUTO: 0.02 THOUSAND/ΜL (ref 0–0.61)
EOSINOPHIL NFR BLD AUTO: 0 % (ref 0–6)
ERYTHROCYTE [DISTWIDTH] IN BLOOD BY AUTOMATED COUNT: 13.1 % (ref 11.6–15.1)
ERYTHROCYTE [SEDIMENTATION RATE] IN BLOOD: 66 MM/HOUR (ref 0–10)
EST. AVERAGE GLUCOSE BLD GHB EST-MCNC: 166 MG/DL
GFR SERPL CREATININE-BSD FRML MDRD: 76 ML/MIN/1.73SQ M
GLUCOSE P FAST SERPL-MCNC: 181 MG/DL (ref 65–99)
GLUCOSE UR STRIP-MCNC: NEGATIVE MG/DL
HBA1C MFR BLD: 7.4 %
HCT VFR BLD AUTO: 29.8 % (ref 36.5–49.3)
HDLC SERPL-MCNC: 48 MG/DL
HGB BLD-MCNC: 9.5 G/DL (ref 12–17)
HGB UR QL STRIP.AUTO: NEGATIVE
IMM GRANULOCYTES # BLD AUTO: 0.05 THOUSAND/UL (ref 0–0.2)
IMM GRANULOCYTES NFR BLD AUTO: 1 % (ref 0–2)
KETONES UR STRIP-MCNC: NEGATIVE MG/DL
LDLC SERPL CALC-MCNC: 62 MG/DL (ref 0–100)
LEUKOCYTE ESTERASE UR QL STRIP: NEGATIVE
LYMPHOCYTES # BLD AUTO: 1.05 THOUSANDS/ΜL (ref 0.6–4.47)
LYMPHOCYTES NFR BLD AUTO: 15 % (ref 14–44)
MCH RBC QN AUTO: 29.5 PG (ref 26.8–34.3)
MCHC RBC AUTO-ENTMCNC: 31.9 G/DL (ref 31.4–37.4)
MCV RBC AUTO: 93 FL (ref 82–98)
MONOCYTES # BLD AUTO: 0.65 THOUSAND/ΜL (ref 0.17–1.22)
MONOCYTES NFR BLD AUTO: 9 % (ref 4–12)
MUCOUS THREADS UR QL AUTO: ABNORMAL
NEUTROPHILS # BLD AUTO: 5.48 THOUSANDS/ΜL (ref 1.85–7.62)
NEUTS SEG NFR BLD AUTO: 75 % (ref 43–75)
NITRITE UR QL STRIP: NEGATIVE
NON-SQ EPI CELLS URNS QL MICRO: ABNORMAL /HPF
NRBC BLD AUTO-RTO: 0 /100 WBCS
PH UR STRIP.AUTO: 7 [PH]
PLATELET # BLD AUTO: 436 THOUSANDS/UL (ref 149–390)
PMV BLD AUTO: 8.4 FL (ref 8.9–12.7)
POTASSIUM SERPL-SCNC: 3.8 MMOL/L (ref 3.5–5.3)
PROT SERPL-MCNC: 6.4 G/DL (ref 6.4–8.2)
PROT UR STRIP-MCNC: ABNORMAL MG/DL
RBC # BLD AUTO: 3.22 MILLION/UL (ref 3.88–5.62)
RBC #/AREA URNS AUTO: ABNORMAL /HPF
SODIUM SERPL-SCNC: 136 MMOL/L (ref 136–145)
SP GR UR STRIP.AUTO: 1.02 (ref 1–1.03)
TRIGL SERPL-MCNC: 48 MG/DL
UROBILINOGEN UR QL STRIP.AUTO: 1 E.U./DL
WBC # BLD AUTO: 7.26 THOUSAND/UL (ref 4.31–10.16)
WBC #/AREA URNS AUTO: ABNORMAL /HPF

## 2020-05-29 PROCEDURE — 83036 HEMOGLOBIN GLYCOSYLATED A1C: CPT | Performed by: INTERNAL MEDICINE

## 2020-05-29 PROCEDURE — 85025 COMPLETE CBC W/AUTO DIFF WBC: CPT | Performed by: INTERNAL MEDICINE

## 2020-05-29 PROCEDURE — 81001 URINALYSIS AUTO W/SCOPE: CPT | Performed by: INTERNAL MEDICINE

## 2020-05-29 PROCEDURE — 82306 VITAMIN D 25 HYDROXY: CPT | Performed by: INTERNAL MEDICINE

## 2020-05-29 PROCEDURE — 80061 LIPID PANEL: CPT | Performed by: INTERNAL MEDICINE

## 2020-05-29 PROCEDURE — 80053 COMPREHEN METABOLIC PANEL: CPT | Performed by: INTERNAL MEDICINE

## 2020-05-29 PROCEDURE — 82550 ASSAY OF CK (CPK): CPT

## 2020-05-29 PROCEDURE — 85652 RBC SED RATE AUTOMATED: CPT

## 2020-05-29 PROCEDURE — 86140 C-REACTIVE PROTEIN: CPT

## 2020-06-01 DIAGNOSIS — I10 ESSENTIAL HYPERTENSION: ICD-10-CM

## 2020-06-02 DIAGNOSIS — D64.9 ANEMIA, UNSPECIFIED TYPE: Primary | ICD-10-CM

## 2020-06-02 RX ORDER — AMLODIPINE BESYLATE 5 MG/1
5 TABLET ORAL DAILY
Qty: 90 TABLET | Refills: 1 | Status: SHIPPED | OUTPATIENT
Start: 2020-06-02 | End: 2020-10-13

## 2020-06-03 DIAGNOSIS — I48.0 PAF (PAROXYSMAL ATRIAL FIBRILLATION) (HCC): Primary | ICD-10-CM

## 2020-06-05 DIAGNOSIS — R60.9 EDEMA, UNSPECIFIED TYPE: Primary | ICD-10-CM

## 2020-06-05 RX ORDER — POTASSIUM CHLORIDE 20 MEQ/1
20 TABLET, EXTENDED RELEASE ORAL DAILY
Qty: 30 TABLET | Refills: 3 | Status: SHIPPED | OUTPATIENT
Start: 2020-06-05 | End: 2020-09-22

## 2020-06-05 RX ORDER — FUROSEMIDE 20 MG/1
20 TABLET ORAL DAILY
Qty: 30 TABLET | Refills: 2 | Status: SHIPPED | OUTPATIENT
Start: 2020-06-05 | End: 2020-08-25

## 2020-06-06 ENCOUNTER — TRANSCRIBE ORDERS (OUTPATIENT)
Dept: LAB | Facility: HOSPITAL | Age: 85
End: 2020-06-06

## 2020-06-06 ENCOUNTER — LAB (OUTPATIENT)
Dept: LAB | Facility: HOSPITAL | Age: 85
End: 2020-06-06
Payer: MEDICARE

## 2020-06-06 DIAGNOSIS — D64.9 ANEMIA, UNSPECIFIED TYPE: Primary | ICD-10-CM

## 2020-06-06 DIAGNOSIS — D64.9 ANEMIA, UNSPECIFIED TYPE: ICD-10-CM

## 2020-06-06 LAB
BASOPHILS # BLD AUTO: 0.02 THOUSANDS/ΜL (ref 0–0.1)
BASOPHILS NFR BLD AUTO: 0 % (ref 0–1)
EOSINOPHIL # BLD AUTO: 0.08 THOUSAND/ΜL (ref 0–0.61)
EOSINOPHIL NFR BLD AUTO: 1 % (ref 0–6)
ERYTHROCYTE [DISTWIDTH] IN BLOOD BY AUTOMATED COUNT: 13.5 % (ref 11.6–15.1)
HCT VFR BLD AUTO: 27.6 % (ref 36.5–49.3)
HGB BLD-MCNC: 8.7 G/DL (ref 12–17)
IMM GRANULOCYTES # BLD AUTO: 0.09 THOUSAND/UL (ref 0–0.2)
IMM GRANULOCYTES NFR BLD AUTO: 1 % (ref 0–2)
LYMPHOCYTES # BLD AUTO: 1.07 THOUSANDS/ΜL (ref 0.6–4.47)
LYMPHOCYTES NFR BLD AUTO: 12 % (ref 14–44)
MCH RBC QN AUTO: 29.1 PG (ref 26.8–34.3)
MCHC RBC AUTO-ENTMCNC: 31.5 G/DL (ref 31.4–37.4)
MCV RBC AUTO: 92 FL (ref 82–98)
MONOCYTES # BLD AUTO: 0.79 THOUSAND/ΜL (ref 0.17–1.22)
MONOCYTES NFR BLD AUTO: 9 % (ref 4–12)
NEUTROPHILS # BLD AUTO: 7.21 THOUSANDS/ΜL (ref 1.85–7.62)
NEUTS SEG NFR BLD AUTO: 77 % (ref 43–75)
NRBC BLD AUTO-RTO: 0 /100 WBCS
PLATELET # BLD AUTO: 417 THOUSANDS/UL (ref 149–390)
PMV BLD AUTO: 8.1 FL (ref 8.9–12.7)
RBC # BLD AUTO: 2.99 MILLION/UL (ref 3.88–5.62)
WBC # BLD AUTO: 9.26 THOUSAND/UL (ref 4.31–10.16)

## 2020-06-06 PROCEDURE — 36415 COLL VENOUS BLD VENIPUNCTURE: CPT

## 2020-06-06 PROCEDURE — 85025 COMPLETE CBC W/AUTO DIFF WBC: CPT

## 2020-06-08 ENCOUNTER — HOSPITAL ENCOUNTER (EMERGENCY)
Facility: HOSPITAL | Age: 85
Discharge: HOME | End: 2020-06-08
Attending: EMERGENCY MEDICINE | Admitting: EMERGENCY MEDICINE
Payer: MEDICARE

## 2020-06-08 ENCOUNTER — APPOINTMENT (EMERGENCY)
Dept: RADIOLOGY | Facility: HOSPITAL | Age: 85
End: 2020-06-08
Payer: MEDICARE

## 2020-06-08 VITALS
DIASTOLIC BLOOD PRESSURE: 73 MMHG | HEART RATE: 88 BPM | BODY MASS INDEX: 23.63 KG/M2 | SYSTOLIC BLOOD PRESSURE: 179 MMHG | RESPIRATION RATE: 20 BRPM | WEIGHT: 160 LBS | OXYGEN SATURATION: 99 % | TEMPERATURE: 97.9 F

## 2020-06-08 DIAGNOSIS — R60.0 BILATERAL LOWER EXTREMITY EDEMA: ICD-10-CM

## 2020-06-08 DIAGNOSIS — D64.9 ANEMIA: Primary | ICD-10-CM

## 2020-06-08 LAB
ALBUMIN SERPL BCP-MCNC: 2.3 G/DL (ref 3.5–5)
ALP SERPL-CCNC: 64 U/L (ref 46–116)
ALT SERPL W P-5'-P-CCNC: 20 U/L (ref 12–78)
ANION GAP SERPL CALCULATED.3IONS-SCNC: 9 MMOL/L (ref 4–13)
APTT PPP: 47 SECONDS (ref 23–37)
AST SERPL W P-5'-P-CCNC: 18 U/L (ref 5–45)
BASOPHILS # BLD AUTO: 0.01 THOUSANDS/ΜL (ref 0–0.1)
BASOPHILS NFR BLD AUTO: 0 % (ref 0–1)
BILIRUB SERPL-MCNC: 0.25 MG/DL (ref 0.2–1)
BUN SERPL-MCNC: 25 MG/DL (ref 5–25)
CALCIUM SERPL-MCNC: 8.3 MG/DL (ref 8.3–10.1)
CHLORIDE SERPL-SCNC: 103 MMOL/L (ref 100–108)
CO2 SERPL-SCNC: 23 MMOL/L (ref 21–32)
CREAT SERPL-MCNC: 0.91 MG/DL (ref 0.6–1.3)
EOSINOPHIL # BLD AUTO: 0.08 THOUSAND/ΜL (ref 0–0.61)
EOSINOPHIL NFR BLD AUTO: 1 % (ref 0–6)
ERYTHROCYTE [DISTWIDTH] IN BLOOD BY AUTOMATED COUNT: 13.7 % (ref 11.6–15.1)
FERRITIN SERPL-MCNC: 219 NG/ML (ref 8–388)
GFR SERPL CREATININE-BSD FRML MDRD: 72 ML/MIN/1.73SQ M
GLUCOSE SERPL-MCNC: 179 MG/DL (ref 65–140)
HCT VFR BLD AUTO: 27.9 % (ref 36.5–49.3)
HGB BLD-MCNC: 8.9 G/DL (ref 12–17)
IMM GRANULOCYTES # BLD AUTO: 0.05 THOUSAND/UL (ref 0–0.2)
IMM GRANULOCYTES NFR BLD AUTO: 1 % (ref 0–2)
INR PPP: 2.27 (ref 0.84–1.19)
IRON SATN MFR SERPL: 11 %
IRON SERPL-MCNC: 22 UG/DL (ref 65–175)
LYMPHOCYTES # BLD AUTO: 1.26 THOUSANDS/ΜL (ref 0.6–4.47)
LYMPHOCYTES NFR BLD AUTO: 18 % (ref 14–44)
MCH RBC QN AUTO: 29.3 PG (ref 26.8–34.3)
MCHC RBC AUTO-ENTMCNC: 31.9 G/DL (ref 31.4–37.4)
MCV RBC AUTO: 92 FL (ref 82–98)
MONOCYTES # BLD AUTO: 0.7 THOUSAND/ΜL (ref 0.17–1.22)
MONOCYTES NFR BLD AUTO: 10 % (ref 4–12)
NEUTROPHILS # BLD AUTO: 4.99 THOUSANDS/ΜL (ref 1.85–7.62)
NEUTS SEG NFR BLD AUTO: 70 % (ref 43–75)
NRBC BLD AUTO-RTO: 0 /100 WBCS
PLATELET # BLD AUTO: 421 THOUSANDS/UL (ref 149–390)
PMV BLD AUTO: 8.4 FL (ref 8.9–12.7)
POTASSIUM SERPL-SCNC: 4.2 MMOL/L (ref 3.5–5.3)
PROT SERPL-MCNC: 6.9 G/DL (ref 6.4–8.2)
PROTHROMBIN TIME: 24.5 SECONDS (ref 11.6–14.5)
RBC # BLD AUTO: 3.04 MILLION/UL (ref 3.88–5.62)
SODIUM SERPL-SCNC: 135 MMOL/L (ref 136–145)
TIBC SERPL-MCNC: 204 UG/DL (ref 250–450)
TROPONIN I SERPL-MCNC: <0.02 NG/ML
WBC # BLD AUTO: 7.09 THOUSAND/UL (ref 4.31–10.16)

## 2020-06-08 PROCEDURE — 83540 ASSAY OF IRON: CPT | Performed by: EMERGENCY MEDICINE

## 2020-06-08 PROCEDURE — 85610 PROTHROMBIN TIME: CPT | Performed by: EMERGENCY MEDICINE

## 2020-06-08 PROCEDURE — 93005 ELECTROCARDIOGRAM TRACING: CPT

## 2020-06-08 PROCEDURE — 83550 IRON BINDING TEST: CPT | Performed by: EMERGENCY MEDICINE

## 2020-06-08 PROCEDURE — 80053 COMPREHEN METABOLIC PANEL: CPT | Performed by: EMERGENCY MEDICINE

## 2020-06-08 PROCEDURE — 36415 COLL VENOUS BLD VENIPUNCTURE: CPT | Performed by: EMERGENCY MEDICINE

## 2020-06-08 PROCEDURE — 99285 EMERGENCY DEPT VISIT HI MDM: CPT | Performed by: EMERGENCY MEDICINE

## 2020-06-08 PROCEDURE — 71045 X-RAY EXAM CHEST 1 VIEW: CPT

## 2020-06-08 PROCEDURE — 84484 ASSAY OF TROPONIN QUANT: CPT | Performed by: EMERGENCY MEDICINE

## 2020-06-08 PROCEDURE — 82728 ASSAY OF FERRITIN: CPT | Performed by: EMERGENCY MEDICINE

## 2020-06-08 PROCEDURE — 85025 COMPLETE CBC W/AUTO DIFF WBC: CPT | Performed by: EMERGENCY MEDICINE

## 2020-06-08 PROCEDURE — 85730 THROMBOPLASTIN TIME PARTIAL: CPT | Performed by: EMERGENCY MEDICINE

## 2020-06-08 PROCEDURE — 99284 EMERGENCY DEPT VISIT MOD MDM: CPT

## 2020-06-09 LAB
ATRIAL RATE: 81 BPM
QRS AXIS: 21 DEGREES
QRSD INTERVAL: 140 MS
QT INTERVAL: 416 MS
QTC INTERVAL: 483 MS
T WAVE AXIS: 74 DEGREES
VENTRICULAR RATE: 81 BPM

## 2020-06-09 PROCEDURE — 93010 ELECTROCARDIOGRAM REPORT: CPT | Performed by: INTERNAL MEDICINE

## 2020-06-10 ENCOUNTER — TELEPHONE (OUTPATIENT)
Dept: CARDIOLOGY CLINIC | Facility: CLINIC | Age: 85
End: 2020-06-10

## 2020-06-10 ENCOUNTER — TELEPHONE (OUTPATIENT)
Dept: INTERNAL MEDICINE CLINIC | Facility: CLINIC | Age: 85
End: 2020-06-10

## 2020-06-11 ENCOUNTER — OFFICE VISIT (OUTPATIENT)
Dept: CARDIOLOGY CLINIC | Facility: CLINIC | Age: 85
End: 2020-06-11
Payer: MEDICARE

## 2020-06-11 VITALS
HEIGHT: 69 IN | BODY MASS INDEX: 24.88 KG/M2 | TEMPERATURE: 98.9 F | DIASTOLIC BLOOD PRESSURE: 56 MMHG | WEIGHT: 168 LBS | HEART RATE: 96 BPM | SYSTOLIC BLOOD PRESSURE: 114 MMHG

## 2020-06-11 DIAGNOSIS — I44.7 LEFT BUNDLE-BRANCH BLOCK: ICD-10-CM

## 2020-06-11 DIAGNOSIS — I48.0 PAROXYSMAL ATRIAL FIBRILLATION (HCC): Primary | ICD-10-CM

## 2020-06-11 DIAGNOSIS — D62 ANEMIA DUE TO ACUTE BLOOD LOSS: ICD-10-CM

## 2020-06-11 DIAGNOSIS — I10 ESSENTIAL HYPERTENSION: ICD-10-CM

## 2020-06-11 DIAGNOSIS — E78.5 DYSLIPIDEMIA: ICD-10-CM

## 2020-06-11 PROCEDURE — 3078F DIAST BP <80 MM HG: CPT | Performed by: INTERNAL MEDICINE

## 2020-06-11 PROCEDURE — 3051F HG A1C>EQUAL 7.0%<8.0%: CPT | Performed by: INTERNAL MEDICINE

## 2020-06-11 PROCEDURE — 1160F RVW MEDS BY RX/DR IN RCRD: CPT | Performed by: INTERNAL MEDICINE

## 2020-06-11 PROCEDURE — 99214 OFFICE O/P EST MOD 30 MIN: CPT | Performed by: INTERNAL MEDICINE

## 2020-06-11 PROCEDURE — 93000 ELECTROCARDIOGRAM COMPLETE: CPT | Performed by: INTERNAL MEDICINE

## 2020-06-11 PROCEDURE — 3008F BODY MASS INDEX DOCD: CPT | Performed by: INTERNAL MEDICINE

## 2020-06-11 PROCEDURE — 1036F TOBACCO NON-USER: CPT | Performed by: INTERNAL MEDICINE

## 2020-06-11 PROCEDURE — 4040F PNEUMOC VAC/ADMIN/RCVD: CPT | Performed by: INTERNAL MEDICINE

## 2020-06-11 PROCEDURE — 3074F SYST BP LT 130 MM HG: CPT | Performed by: INTERNAL MEDICINE

## 2020-06-15 ENCOUNTER — ANTICOAG VISIT (OUTPATIENT)
Dept: CARDIOLOGY CLINIC | Facility: CLINIC | Age: 85
End: 2020-06-15

## 2020-06-15 ENCOUNTER — APPOINTMENT (OUTPATIENT)
Dept: LAB | Facility: HOSPITAL | Age: 85
End: 2020-06-15
Payer: MEDICARE

## 2020-06-15 ENCOUNTER — OFFICE VISIT (OUTPATIENT)
Dept: INTERNAL MEDICINE CLINIC | Facility: CLINIC | Age: 85
End: 2020-06-15
Payer: MEDICARE

## 2020-06-15 VITALS
WEIGHT: 167.6 LBS | TEMPERATURE: 98.5 F | HEIGHT: 69 IN | DIASTOLIC BLOOD PRESSURE: 57 MMHG | HEART RATE: 97 BPM | BODY MASS INDEX: 24.82 KG/M2 | SYSTOLIC BLOOD PRESSURE: 127 MMHG

## 2020-06-15 DIAGNOSIS — E11.9 CONTROLLED TYPE 2 DIABETES MELLITUS WITHOUT COMPLICATION, WITHOUT LONG-TERM CURRENT USE OF INSULIN (HCC): ICD-10-CM

## 2020-06-15 DIAGNOSIS — D64.9 ANEMIA, UNSPECIFIED TYPE: ICD-10-CM

## 2020-06-15 DIAGNOSIS — K59.09 CHRONIC CONSTIPATION: ICD-10-CM

## 2020-06-15 DIAGNOSIS — I10 ESSENTIAL HYPERTENSION: Primary | ICD-10-CM

## 2020-06-15 DIAGNOSIS — D50.9 IRON DEFICIENCY ANEMIA, UNSPECIFIED IRON DEFICIENCY ANEMIA TYPE: ICD-10-CM

## 2020-06-15 DIAGNOSIS — E11.69 TYPE 2 DIABETES MELLITUS WITH OTHER SPECIFIED COMPLICATION, UNSPECIFIED WHETHER LONG TERM INSULIN USE (HCC): ICD-10-CM

## 2020-06-15 DIAGNOSIS — I48.0 PAROXYSMAL ATRIAL FIBRILLATION (HCC): ICD-10-CM

## 2020-06-15 DIAGNOSIS — M05.9 SEROPOSITIVE RHEUMATOID ARTHRITIS (HCC): ICD-10-CM

## 2020-06-15 LAB
ANION GAP SERPL CALCULATED.3IONS-SCNC: 7 MMOL/L (ref 4–13)
BASOPHILS # BLD AUTO: 0.02 THOUSANDS/ΜL (ref 0–0.1)
BASOPHILS NFR BLD AUTO: 0 % (ref 0–1)
BUN SERPL-MCNC: 19 MG/DL (ref 5–25)
CALCIUM SERPL-MCNC: 9 MG/DL (ref 8.3–10.1)
CHLORIDE SERPL-SCNC: 105 MMOL/L (ref 100–108)
CO2 SERPL-SCNC: 24 MMOL/L (ref 21–32)
CREAT SERPL-MCNC: 0.95 MG/DL (ref 0.6–1.3)
EOSINOPHIL # BLD AUTO: 0.17 THOUSAND/ΜL (ref 0–0.61)
EOSINOPHIL NFR BLD AUTO: 2 % (ref 0–6)
ERYTHROCYTE [DISTWIDTH] IN BLOOD BY AUTOMATED COUNT: 13.8 % (ref 11.6–15.1)
FERRITIN SERPL-MCNC: 220 NG/ML (ref 8–388)
GFR SERPL CREATININE-BSD FRML MDRD: 69 ML/MIN/1.73SQ M
GLUCOSE P FAST SERPL-MCNC: 182 MG/DL (ref 65–99)
HCT VFR BLD AUTO: 29.5 % (ref 36.5–49.3)
HGB BLD-MCNC: 9.2 G/DL (ref 12–17)
IMM GRANULOCYTES # BLD AUTO: 0.08 THOUSAND/UL (ref 0–0.2)
IMM GRANULOCYTES NFR BLD AUTO: 1 % (ref 0–2)
IRON SATN MFR SERPL: 18 %
IRON SERPL-MCNC: 36 UG/DL (ref 65–175)
LYMPHOCYTES # BLD AUTO: 1.24 THOUSANDS/ΜL (ref 0.6–4.47)
LYMPHOCYTES NFR BLD AUTO: 16 % (ref 14–44)
MCH RBC QN AUTO: 28.3 PG (ref 26.8–34.3)
MCHC RBC AUTO-ENTMCNC: 31.2 G/DL (ref 31.4–37.4)
MCV RBC AUTO: 91 FL (ref 82–98)
MONOCYTES # BLD AUTO: 0.54 THOUSAND/ΜL (ref 0.17–1.22)
MONOCYTES NFR BLD AUTO: 7 % (ref 4–12)
NEUTROPHILS # BLD AUTO: 5.57 THOUSANDS/ΜL (ref 1.85–7.62)
NEUTS SEG NFR BLD AUTO: 74 % (ref 43–75)
NRBC BLD AUTO-RTO: 0 /100 WBCS
PLATELET # BLD AUTO: 449 THOUSANDS/UL (ref 149–390)
PMV BLD AUTO: 8.4 FL (ref 8.9–12.7)
POTASSIUM SERPL-SCNC: 4 MMOL/L (ref 3.5–5.3)
RBC # BLD AUTO: 3.25 MILLION/UL (ref 3.88–5.62)
SODIUM SERPL-SCNC: 136 MMOL/L (ref 136–145)
TIBC SERPL-MCNC: 201 UG/DL (ref 250–450)
WBC # BLD AUTO: 7.62 THOUSAND/UL (ref 4.31–10.16)

## 2020-06-15 PROCEDURE — 3008F BODY MASS INDEX DOCD: CPT | Performed by: INTERNAL MEDICINE

## 2020-06-15 PROCEDURE — 82728 ASSAY OF FERRITIN: CPT

## 2020-06-15 PROCEDURE — 99214 OFFICE O/P EST MOD 30 MIN: CPT | Performed by: INTERNAL MEDICINE

## 2020-06-15 PROCEDURE — 3051F HG A1C>EQUAL 7.0%<8.0%: CPT | Performed by: INTERNAL MEDICINE

## 2020-06-15 PROCEDURE — 1160F RVW MEDS BY RX/DR IN RCRD: CPT | Performed by: INTERNAL MEDICINE

## 2020-06-15 PROCEDURE — 85025 COMPLETE CBC W/AUTO DIFF WBC: CPT | Performed by: INTERNAL MEDICINE

## 2020-06-15 PROCEDURE — 3078F DIAST BP <80 MM HG: CPT | Performed by: INTERNAL MEDICINE

## 2020-06-15 PROCEDURE — 4040F PNEUMOC VAC/ADMIN/RCVD: CPT | Performed by: INTERNAL MEDICINE

## 2020-06-15 PROCEDURE — 1036F TOBACCO NON-USER: CPT | Performed by: INTERNAL MEDICINE

## 2020-06-15 PROCEDURE — 80048 BASIC METABOLIC PNL TOTAL CA: CPT | Performed by: INTERNAL MEDICINE

## 2020-06-15 PROCEDURE — 83550 IRON BINDING TEST: CPT

## 2020-06-15 PROCEDURE — 83540 ASSAY OF IRON: CPT

## 2020-06-15 PROCEDURE — 3074F SYST BP LT 130 MM HG: CPT | Performed by: INTERNAL MEDICINE

## 2020-06-15 RX ORDER — POLYETHYLENE GLYCOL 3350 17 G/17G
17 POWDER, FOR SOLUTION ORAL DAILY
Qty: 250 G | Refills: 0 | Status: SHIPPED | OUTPATIENT
Start: 2020-06-15 | End: 2020-07-07

## 2020-06-15 RX ORDER — FERROUS SULFATE TAB EC 324 MG (65 MG FE EQUIVALENT) 324 (65 FE) MG
324 TABLET DELAYED RESPONSE ORAL EVERY OTHER DAY
Qty: 30 TABLET | Refills: 0 | Status: SHIPPED | OUTPATIENT
Start: 2020-06-15 | End: 2020-07-30

## 2020-06-17 ENCOUNTER — APPOINTMENT (OUTPATIENT)
Dept: LAB | Facility: HOSPITAL | Age: 85
End: 2020-06-17
Payer: MEDICARE

## 2020-06-17 DIAGNOSIS — D50.9 IRON DEFICIENCY ANEMIA, UNSPECIFIED IRON DEFICIENCY ANEMIA TYPE: ICD-10-CM

## 2020-06-17 LAB
BASOPHILS # BLD AUTO: 0.01 THOUSANDS/ΜL (ref 0–0.1)
BASOPHILS NFR BLD AUTO: 0 % (ref 0–1)
EOSINOPHIL # BLD AUTO: 0.24 THOUSAND/ΜL (ref 0–0.61)
EOSINOPHIL NFR BLD AUTO: 3 % (ref 0–6)
ERYTHROCYTE [DISTWIDTH] IN BLOOD BY AUTOMATED COUNT: 13.9 % (ref 11.6–15.1)
FERRITIN SERPL-MCNC: 189 NG/ML (ref 8–388)
FOLATE SERPL-MCNC: >20 NG/ML (ref 3.1–17.5)
HCT VFR BLD AUTO: 28.9 % (ref 36.5–49.3)
HGB BLD-MCNC: 9 G/DL (ref 12–17)
IMM GRANULOCYTES # BLD AUTO: 0.1 THOUSAND/UL (ref 0–0.2)
IMM GRANULOCYTES NFR BLD AUTO: 1 % (ref 0–2)
IRON SATN MFR SERPL: 13 %
IRON SERPL-MCNC: 29 UG/DL (ref 65–175)
LYMPHOCYTES # BLD AUTO: 1.13 THOUSANDS/ΜL (ref 0.6–4.47)
LYMPHOCYTES NFR BLD AUTO: 15 % (ref 14–44)
MCH RBC QN AUTO: 28.6 PG (ref 26.8–34.3)
MCHC RBC AUTO-ENTMCNC: 31.1 G/DL (ref 31.4–37.4)
MCV RBC AUTO: 92 FL (ref 82–98)
MONOCYTES # BLD AUTO: 0.59 THOUSAND/ΜL (ref 0.17–1.22)
MONOCYTES NFR BLD AUTO: 8 % (ref 4–12)
NEUTROPHILS # BLD AUTO: 5.51 THOUSANDS/ΜL (ref 1.85–7.62)
NEUTS SEG NFR BLD AUTO: 73 % (ref 43–75)
NRBC BLD AUTO-RTO: 0 /100 WBCS
PLATELET # BLD AUTO: 404 THOUSANDS/UL (ref 149–390)
PMV BLD AUTO: 8.3 FL (ref 8.9–12.7)
RBC # BLD AUTO: 3.15 MILLION/UL (ref 3.88–5.62)
TIBC SERPL-MCNC: 224 UG/DL (ref 250–450)
VIT B12 SERPL-MCNC: 428 PG/ML (ref 100–900)
WBC # BLD AUTO: 7.58 THOUSAND/UL (ref 4.31–10.16)

## 2020-06-17 PROCEDURE — 82746 ASSAY OF FOLIC ACID SERUM: CPT

## 2020-06-17 PROCEDURE — 82607 VITAMIN B-12: CPT | Performed by: INTERNAL MEDICINE

## 2020-06-17 PROCEDURE — 83540 ASSAY OF IRON: CPT

## 2020-06-17 PROCEDURE — 82728 ASSAY OF FERRITIN: CPT

## 2020-06-17 PROCEDURE — 83550 IRON BINDING TEST: CPT

## 2020-06-17 PROCEDURE — 85025 COMPLETE CBC W/AUTO DIFF WBC: CPT | Performed by: INTERNAL MEDICINE

## 2020-06-23 ENCOUNTER — TELEPHONE (OUTPATIENT)
Dept: INTERNAL MEDICINE CLINIC | Facility: CLINIC | Age: 85
End: 2020-06-23

## 2020-06-23 DIAGNOSIS — D50.9 IRON DEFICIENCY ANEMIA, UNSPECIFIED IRON DEFICIENCY ANEMIA TYPE: Primary | ICD-10-CM

## 2020-06-23 DIAGNOSIS — D52.9 ANEMIA DUE TO FOLIC ACID DEFICIENCY, UNSPECIFIED DEFICIENCY TYPE: ICD-10-CM

## 2020-06-23 DIAGNOSIS — D51.9 ANEMIA DUE TO VITAMIN B12 DEFICIENCY, UNSPECIFIED B12 DEFICIENCY TYPE: ICD-10-CM

## 2020-06-25 ENCOUNTER — TELEPHONE (OUTPATIENT)
Dept: CARDIOLOGY CLINIC | Facility: CLINIC | Age: 85
End: 2020-06-25

## 2020-07-07 DIAGNOSIS — K59.09 CHRONIC CONSTIPATION: ICD-10-CM

## 2020-07-07 RX ORDER — POLYETHYLENE GLYCOL 3350 17 G/17G
POWDER ORAL
Qty: 250 G | Refills: 0 | Status: SHIPPED | OUTPATIENT
Start: 2020-07-07 | End: 2020-07-23

## 2020-07-21 ENCOUNTER — APPOINTMENT (OUTPATIENT)
Dept: LAB | Facility: HOSPITAL | Age: 85
End: 2020-07-21
Payer: MEDICARE

## 2020-07-21 DIAGNOSIS — D52.9 ANEMIA DUE TO FOLIC ACID DEFICIENCY, UNSPECIFIED DEFICIENCY TYPE: ICD-10-CM

## 2020-07-21 DIAGNOSIS — D50.9 IRON DEFICIENCY ANEMIA, UNSPECIFIED IRON DEFICIENCY ANEMIA TYPE: ICD-10-CM

## 2020-07-21 DIAGNOSIS — D51.9 ANEMIA DUE TO VITAMIN B12 DEFICIENCY, UNSPECIFIED B12 DEFICIENCY TYPE: ICD-10-CM

## 2020-07-21 LAB
BASOPHILS # BLD AUTO: 0.02 THOUSANDS/ΜL (ref 0–0.1)
BASOPHILS NFR BLD AUTO: 0 % (ref 0–1)
EOSINOPHIL # BLD AUTO: 0.08 THOUSAND/ΜL (ref 0–0.61)
EOSINOPHIL NFR BLD AUTO: 1 % (ref 0–6)
ERYTHROCYTE [DISTWIDTH] IN BLOOD BY AUTOMATED COUNT: 16.1 % (ref 11.6–15.1)
FERRITIN SERPL-MCNC: 143 NG/ML (ref 8–388)
FOLATE SERPL-MCNC: >20 NG/ML (ref 3.1–17.5)
HCT VFR BLD AUTO: 30.2 % (ref 36.5–49.3)
HGB BLD-MCNC: 9.5 G/DL (ref 12–17)
IMM GRANULOCYTES # BLD AUTO: 0.04 THOUSAND/UL (ref 0–0.2)
IMM GRANULOCYTES NFR BLD AUTO: 1 % (ref 0–2)
IRON SATN MFR SERPL: 13 %
IRON SERPL-MCNC: 32 UG/DL (ref 65–175)
LYMPHOCYTES # BLD AUTO: 1.14 THOUSANDS/ΜL (ref 0.6–4.47)
LYMPHOCYTES NFR BLD AUTO: 15 % (ref 14–44)
MCH RBC QN AUTO: 28.3 PG (ref 26.8–34.3)
MCHC RBC AUTO-ENTMCNC: 31.5 G/DL (ref 31.4–37.4)
MCV RBC AUTO: 90 FL (ref 82–98)
MONOCYTES # BLD AUTO: 0.58 THOUSAND/ΜL (ref 0.17–1.22)
MONOCYTES NFR BLD AUTO: 8 % (ref 4–12)
NEUTROPHILS # BLD AUTO: 5.54 THOUSANDS/ΜL (ref 1.85–7.62)
NEUTS SEG NFR BLD AUTO: 75 % (ref 43–75)
NRBC BLD AUTO-RTO: 0 /100 WBCS
PLATELET # BLD AUTO: 370 THOUSANDS/UL (ref 149–390)
PMV BLD AUTO: 8.4 FL (ref 8.9–12.7)
RBC # BLD AUTO: 3.36 MILLION/UL (ref 3.88–5.62)
TIBC SERPL-MCNC: 252 UG/DL (ref 250–450)
VIT B12 SERPL-MCNC: 422 PG/ML (ref 100–900)
WBC # BLD AUTO: 7.4 THOUSAND/UL (ref 4.31–10.16)

## 2020-07-21 PROCEDURE — 83550 IRON BINDING TEST: CPT

## 2020-07-21 PROCEDURE — 85025 COMPLETE CBC W/AUTO DIFF WBC: CPT

## 2020-07-21 PROCEDURE — 82607 VITAMIN B-12: CPT

## 2020-07-21 PROCEDURE — 83540 ASSAY OF IRON: CPT

## 2020-07-21 PROCEDURE — 82728 ASSAY OF FERRITIN: CPT

## 2020-07-21 PROCEDURE — 82746 ASSAY OF FOLIC ACID SERUM: CPT

## 2020-07-22 ENCOUNTER — HOSPITAL ENCOUNTER (OUTPATIENT)
Dept: NON INVASIVE DIAGNOSTICS | Facility: CLINIC | Age: 85
Discharge: HOME | End: 2020-07-22
Payer: MEDICARE

## 2020-07-22 DIAGNOSIS — I10 ESSENTIAL HYPERTENSION: ICD-10-CM

## 2020-07-22 DIAGNOSIS — I48.0 PAROXYSMAL ATRIAL FIBRILLATION (HCC): ICD-10-CM

## 2020-07-22 DIAGNOSIS — E78.5 DYSLIPIDEMIA: ICD-10-CM

## 2020-07-22 DIAGNOSIS — D62 ANEMIA DUE TO ACUTE BLOOD LOSS: ICD-10-CM

## 2020-07-22 DIAGNOSIS — K59.09 CHRONIC CONSTIPATION: ICD-10-CM

## 2020-07-22 DIAGNOSIS — I44.7 LEFT BUNDLE-BRANCH BLOCK: ICD-10-CM

## 2020-07-22 PROCEDURE — 93306 TTE W/DOPPLER COMPLETE: CPT | Performed by: INTERNAL MEDICINE

## 2020-07-22 PROCEDURE — 93306 TTE W/DOPPLER COMPLETE: CPT

## 2020-07-23 RX ORDER — POLYETHYLENE GLYCOL 3350 17 G/17G
POWDER ORAL
Qty: 250 G | Refills: 0 | Status: SHIPPED | OUTPATIENT
Start: 2020-07-23 | End: 2020-07-27 | Stop reason: SDUPTHER

## 2020-07-27 ENCOUNTER — OFFICE VISIT (OUTPATIENT)
Dept: INTERNAL MEDICINE CLINIC | Facility: CLINIC | Age: 85
End: 2020-07-27
Payer: MEDICARE

## 2020-07-27 VITALS
SYSTOLIC BLOOD PRESSURE: 130 MMHG | DIASTOLIC BLOOD PRESSURE: 50 MMHG | WEIGHT: 168.6 LBS | BODY MASS INDEX: 25.55 KG/M2 | HEART RATE: 89 BPM | TEMPERATURE: 99.7 F | HEIGHT: 68 IN | OXYGEN SATURATION: 99 %

## 2020-07-27 DIAGNOSIS — Z85.46 HISTORY OF PROSTATE CANCER: ICD-10-CM

## 2020-07-27 DIAGNOSIS — I10 ESSENTIAL HYPERTENSION: ICD-10-CM

## 2020-07-27 DIAGNOSIS — E78.5 DYSLIPIDEMIA: ICD-10-CM

## 2020-07-27 DIAGNOSIS — I48.0 PAROXYSMAL ATRIAL FIBRILLATION (HCC): ICD-10-CM

## 2020-07-27 DIAGNOSIS — D63.8 ANEMIA OF CHRONIC DISEASE: ICD-10-CM

## 2020-07-27 DIAGNOSIS — M05.9 SEROPOSITIVE RHEUMATOID ARTHRITIS (HCC): Primary | ICD-10-CM

## 2020-07-27 DIAGNOSIS — K59.09 CHRONIC CONSTIPATION: ICD-10-CM

## 2020-07-27 DIAGNOSIS — E11.9 CONTROLLED TYPE 2 DIABETES MELLITUS WITHOUT COMPLICATION, WITHOUT LONG-TERM CURRENT USE OF INSULIN (HCC): ICD-10-CM

## 2020-07-27 DIAGNOSIS — D50.9 IRON DEFICIENCY ANEMIA, UNSPECIFIED IRON DEFICIENCY ANEMIA TYPE: ICD-10-CM

## 2020-07-27 PROCEDURE — 3075F SYST BP GE 130 - 139MM HG: CPT | Performed by: INTERNAL MEDICINE

## 2020-07-27 PROCEDURE — 99214 OFFICE O/P EST MOD 30 MIN: CPT | Performed by: INTERNAL MEDICINE

## 2020-07-27 PROCEDURE — 3051F HG A1C>EQUAL 7.0%<8.0%: CPT | Performed by: INTERNAL MEDICINE

## 2020-07-27 PROCEDURE — 3078F DIAST BP <80 MM HG: CPT | Performed by: INTERNAL MEDICINE

## 2020-07-27 PROCEDURE — 4040F PNEUMOC VAC/ADMIN/RCVD: CPT | Performed by: INTERNAL MEDICINE

## 2020-07-27 PROCEDURE — 1160F RVW MEDS BY RX/DR IN RCRD: CPT | Performed by: INTERNAL MEDICINE

## 2020-07-27 PROCEDURE — 1036F TOBACCO NON-USER: CPT | Performed by: INTERNAL MEDICINE

## 2020-07-27 PROCEDURE — 3008F BODY MASS INDEX DOCD: CPT | Performed by: INTERNAL MEDICINE

## 2020-07-27 RX ORDER — POLYETHYLENE GLYCOL 3350 17 G/17G
17 POWDER ORAL DAILY
Qty: 500 G | Refills: 1 | Status: SHIPPED | OUTPATIENT
Start: 2020-07-27 | End: 2020-09-21

## 2020-07-27 NOTE — PROGRESS NOTES
Assessment/Plan:  1  Seropositive rheumatoid arthritis (HCC)  -     CBC and differential  -     Comprehensive metabolic panel    2  Essential hypertension    3  Anemia of chronic disease  -     CBC and differential  -     Comprehensive metabolic panel    4  Dyslipidemia    5  Paroxysmal atrial fibrillation (HCC)    6  Controlled type 2 diabetes mellitus without complication, without long-term current use of insulin (HCC)  -     Hemoglobin A1C    7  History of prostate cancer  -     Ambulatory referral to Urology; Future    8  Iron deficiency anemia, unspecified iron deficiency anemia type  -     CBC and differential  -     Iron Panel (Includes Ferritin, Iron Sat%, Iron, and TIBC); Future  -     Ferritin    9  Chronic constipation  -     polyethylene glycol (GLYCOLAX) 17 GM/SCOOP; Take 17 g by mouth daily    Rheumatoid arthritis with anemia chronic disease  Get a prior history of deficiency, his iron levels are slightly low but MCV is normal, he has a very iron rich diet, no symptoms suggestive of a GI bleed  Advised him to just recheck in 3 months, he has been working with his rheumatologist to increase his methotrexate dose  I will try to talk to her his rheumatologist about it as well  Pedal edema continues, no systolic dysfunction, on Lasix but he takes it later in the day  I advised him to take it early morning so that he does not have some much nocturia  He does not think he will be able to use compression stockings since he is barely able to put his regular socks on  Constipation improved with MiraLax  Recheck A1c before next appointment, just around 7-7 5, I do not see the need for a medication at this point  Blood pressure is well control, on Eliquis for anticoagulation  Subjective:   Chief Complaint   Patient presents with    Follow-up     6 weeks follow up         Patient ID: Sukhjinder Gomez is a 80 y o  male      Comes in for follow-up anemia, pedal edema, rheumatoid arthritis, hypertension, AFib  He tells me still having stiffness and difficulty moving and fatigue but slowly improving  He is keeping at exercises and almost forces himself to walk every day even if he feels tired  Wakes up several times a night but able to fall asleep again      The following portions of the patient's history were reviewed and updated as appropriate: current medications, past medical history, past social history and past surgical history      PHQ-9 Depression Screening    PHQ-9:    Frequency of the following problems over the past two weeks:                Current Outpatient Medications:     amLODIPine (NORVASC) 5 mg tablet, Take 1 tablet (5 mg total) by mouth daily, Disp: 90 tablet, Rfl: 1    apixaban (ELIQUIS) 5 mg, Take 1 tablet (5 mg total) by mouth 2 (two) times a day, Disp: 180 tablet, Rfl: 3    ARTIFICIAL TEARS 0 1-0 3 % SOLN, Apply 1 drop to eye 2 (two) times a day, Disp: , Rfl:     aspirin 81 MG tablet, Take 1 tablet by mouth daily, Disp: , Rfl:     atorvastatin (LIPITOR) 10 mg tablet, Take 1 tablet (10 mg total) by mouth daily at bedtime, Disp: 90 tablet, Rfl: 1    Cholecalciferol 2000 units CAPS, Take 1 capsule by mouth daily, Disp: , Rfl:     ferrous sulfate 324 (65 Fe) mg, Take 1 tablet (324 mg total) by mouth every other day, Disp: 30 tablet, Rfl: 0    folic acid (FOLVITE) 1 mg tablet, Take 2 mg by mouth daily , Disp: , Rfl:     furosemide (LASIX) 20 mg tablet, Take 1 tablet (20 mg total) by mouth daily, Disp: 30 tablet, Rfl: 2    latanoprost (XALATAN) 0 005 % ophthalmic solution, Apply 0 005 % to eye daily, Disp: , Rfl:     lisinopril (ZESTRIL) 10 mg tablet, Take 1 tablet (10 mg total) by mouth daily, Disp: 90 tablet, Rfl: 1    methotrexate 2 5 mg tablet, Take 7 5 mg by mouth every 7 days , Disp: , Rfl:     Multiple Vitamin tablet, Take 1 tablet by mouth daily, Disp: , Rfl:     Omega-3 Fatty Acids (FISH OIL CONCENTRATE) 1000 MG CAPS, Take 4 capsules by mouth daily, Disp: , Rfl:     polyethylene glycol (GLYCOLAX) 17 GM/SCOOP, Take 17 g by mouth daily, Disp: 500 g, Rfl: 1    potassium chloride (K-DUR,KLOR-CON) 20 mEq tablet, Take 1 tablet (20 mEq total) by mouth daily, Disp: 30 tablet, Rfl: 3    Review of Systems   Constitutional: Positive for fatigue  Negative for fever and unexpected weight change  HENT: Negative for ear pain, hearing loss and sore throat  Eyes: Negative for pain and discharge  Respiratory: Negative for cough, chest tightness and shortness of breath  Cardiovascular: Negative for chest pain and palpitations  Gastrointestinal: Negative for abdominal pain, blood in stool, constipation, diarrhea and nausea  Genitourinary: Negative for dysuria, frequency and hematuria  Musculoskeletal: Positive for arthralgias and gait problem  Negative for joint swelling  Skin: Negative for rash  Allergic/Immunologic: Negative for immunocompromised state  Neurological: Negative for dizziness and headaches  Hematological: Negative for adenopathy  Psychiatric/Behavioral: Negative for confusion and sleep disturbance  Objective:  /50 (BP Location: Left arm, Patient Position: Sitting, Cuff Size: Adult)   Pulse 89   Temp 99 7 °F (37 6 °C) (Tympanic)   Ht 5' 8" (1 727 m)   Wt 76 5 kg (168 lb 9 6 oz)   SpO2 99%   BMI 25 64 kg/m²      Physical Exam   Constitutional: He appears well-developed and well-nourished  HENT:   Head: Normocephalic and atraumatic  Right Ear: Tympanic membrane normal    Left Ear: Tympanic membrane normal    Nose: Nose normal    Eyes: Pupils are equal, round, and reactive to light  Conjunctivae are normal  Right eye exhibits no discharge  Left eye exhibits no discharge  Neck: Normal range of motion  Neck supple  No thyromegaly present  Cardiovascular: Normal rate, S1 normal, S2 normal and normal heart sounds  An irregularly irregular rhythm present  PMI is not displaced  No murmur heard    Plus two pedal edema bilaterally   Pulmonary/Chest: Effort normal and breath sounds normal  No accessory muscle usage  No apnea  No respiratory distress  He has no rhonchi  He has no rales  Abdominal: Soft  Normal appearance and bowel sounds are normal  He exhibits no shifting dullness  There is no hepatosplenomegaly  There is no tenderness  There is no rebound and no CVA tenderness  Musculoskeletal: Normal range of motion  He exhibits no edema or tenderness  Lymphadenopathy:     He has no cervical adenopathy  Neurological: He is alert  Skin: Skin is warm and intact  No rash noted  Psychiatric: He has a normal mood and affect  His speech is normal    Nursing note and vitals reviewed          Recent Results (from the past 1008 hour(s))   Vitamin B12    Collection Time: 06/17/20  7:50 AM   Result Value Ref Range    Vitamin B-12 428 100 - 900 pg/mL   CBC and differential    Collection Time: 06/17/20  7:50 AM   Result Value Ref Range    WBC 7 58 4 31 - 10 16 Thousand/uL    RBC 3 15 (L) 3 88 - 5 62 Million/uL    Hemoglobin 9 0 (L) 12 0 - 17 0 g/dL    Hematocrit 28 9 (L) 36 5 - 49 3 %    MCV 92 82 - 98 fL    MCH 28 6 26 8 - 34 3 pg    MCHC 31 1 (L) 31 4 - 37 4 g/dL    RDW 13 9 11 6 - 15 1 %    MPV 8 3 (L) 8 9 - 12 7 fL    Platelets 217 (H) 963 - 390 Thousands/uL    nRBC 0 /100 WBCs    Neutrophils Relative 73 43 - 75 %    Immat GRANS % 1 0 - 2 %    Lymphocytes Relative 15 14 - 44 %    Monocytes Relative 8 4 - 12 %    Eosinophils Relative 3 0 - 6 %    Basophils Relative 0 0 - 1 %    Neutrophils Absolute 5 51 1 85 - 7 62 Thousands/µL    Immature Grans Absolute 0 10 0 00 - 0 20 Thousand/uL    Lymphocytes Absolute 1 13 0 60 - 4 47 Thousands/µL    Monocytes Absolute 0 59 0 17 - 1 22 Thousand/µL    Eosinophils Absolute 0 24 0 00 - 0 61 Thousand/µL    Basophils Absolute 0 01 0 00 - 0 10 Thousands/µL   Folate    Collection Time: 06/17/20  7:50 AM   Result Value Ref Range    Folate >20 0 (H) 3 1 - 17 5 ng/mL   Iron Saturation %    Collection Time: 06/17/20  7:50 AM   Result Value Ref Range    Iron Saturation 13 %    TIBC 224 (L) 250 - 450 ug/dL    Iron 29 (L) 65 - 175 ug/dL   Ferritin    Collection Time: 06/17/20  7:50 AM   Result Value Ref Range    Ferritin 189 8 - 388 ng/mL   Protime-INR    Collection Time: 07/21/20  8:10 AM   Result Value Ref Range    Protime 17 3 (H) 11 6 - 14 5 seconds    INR 1 41 (H) 0 84 - 1 19   Vitamin B12    Collection Time: 07/21/20  8:11 AM   Result Value Ref Range    Vitamin B-12 422 100 - 900 pg/mL   CBC and differential    Collection Time: 07/21/20  8:11 AM   Result Value Ref Range    WBC 7 40 4 31 - 10 16 Thousand/uL    RBC 3 36 (L) 3 88 - 5 62 Million/uL    Hemoglobin 9 5 (L) 12 0 - 17 0 g/dL    Hematocrit 30 2 (L) 36 5 - 49 3 %    MCV 90 82 - 98 fL    MCH 28 3 26 8 - 34 3 pg    MCHC 31 5 31 4 - 37 4 g/dL    RDW 16 1 (H) 11 6 - 15 1 %    MPV 8 4 (L) 8 9 - 12 7 fL    Platelets 867 682 - 372 Thousands/uL    nRBC 0 /100 WBCs    Neutrophils Relative 75 43 - 75 %    Immat GRANS % 1 0 - 2 %    Lymphocytes Relative 15 14 - 44 %    Monocytes Relative 8 4 - 12 %    Eosinophils Relative 1 0 - 6 %    Basophils Relative 0 0 - 1 %    Neutrophils Absolute 5 54 1 85 - 7 62 Thousands/µL    Immature Grans Absolute 0 04 0 00 - 0 20 Thousand/uL    Lymphocytes Absolute 1 14 0 60 - 4 47 Thousands/µL    Monocytes Absolute 0 58 0 17 - 1 22 Thousand/µL    Eosinophils Absolute 0 08 0 00 - 0 61 Thousand/µL    Basophils Absolute 0 02 0 00 - 0 10 Thousands/µL   Folate    Collection Time: 07/21/20  8:11 AM   Result Value Ref Range    Folate >20 0 (H) 3 1 - 17 5 ng/mL   Iron Saturation %    Collection Time: 07/21/20  8:11 AM   Result Value Ref Range    Iron Saturation 13 %    TIBC 252 250 - 450 ug/dL    Iron 32 (L) 65 - 175 ug/dL   Ferritin    Collection Time: 07/21/20  8:11 AM   Result Value Ref Range    Ferritin 143 8 - 388 ng/mL   ]    Procedure: Xr Chest 1 View Portable    Result Date: 6/8/2020  Narrative: CHEST INDICATION:   anemia   COMPARISON: None EXAM PERFORMED/VIEWS:  XR CHEST PORTABLE FINDINGS: Cardiomediastinal silhouette appears unremarkable  The lungs are clear  No pneumothorax or pleural effusion  Osseous structures appear within normal limits for patient age  Impression: No acute cardiopulmonary disease   Workstation performed: [id number]

## 2020-07-28 ENCOUNTER — TELEPHONE (OUTPATIENT)
Dept: ADMINISTRATIVE | Facility: OTHER | Age: 85
End: 2020-07-28

## 2020-07-28 NOTE — TELEPHONE ENCOUNTER
----- Message from Johna Blizzard, [place] sent at 7/27/2020  1:56 PM EDT -----  07/27/20 1:56 PM    Hello, our patient Gianna Rich has had Diabetic Eye Exam completed/performed  Please assist in updating the patient chart by pulling the document from the Media Tab  The date of service is ##2/20/20#       Thank you,  Johna Blizzard, MA   [facility]

## 2020-07-28 NOTE — TELEPHONE ENCOUNTER
Upon review of your request/inquiry, we have found/obtained the documentation  After careful review of the document we are unable to complete this request for Diabetic Eye Exam because the documentation does not have the proper verbiage (wording) needed to close the requested care gap(s)  Any additional questions or concerns should be emailed to the Practice Liaisons via [email or web address] email, please do not reply via In Basket       Thank you  Yoko Llamas MA

## 2020-07-30 DIAGNOSIS — D50.9 IRON DEFICIENCY ANEMIA, UNSPECIFIED IRON DEFICIENCY ANEMIA TYPE: ICD-10-CM

## 2020-07-30 RX ORDER — FERROUS SULFATE TAB EC 324 MG (65 MG FE EQUIVALENT) 324 (65 FE) MG
TABLET DELAYED RESPONSE ORAL
Qty: 30 TABLET | Refills: 0 | Status: SHIPPED | OUTPATIENT
Start: 2020-07-30 | End: 2020-09-21

## 2020-08-25 DIAGNOSIS — R60.9 EDEMA, UNSPECIFIED TYPE: ICD-10-CM

## 2020-08-25 RX ORDER — FUROSEMIDE 20 MG/1
TABLET ORAL
Qty: 30 TABLET | Refills: 2 | Status: SHIPPED | OUTPATIENT
Start: 2020-08-25 | End: 2020-11-23

## 2020-09-02 DIAGNOSIS — I10 ESSENTIAL HYPERTENSION: ICD-10-CM

## 2020-09-02 RX ORDER — LISINOPRIL 10 MG/1
TABLET ORAL
Qty: 90 TABLET | Refills: 1 | Status: SHIPPED | OUTPATIENT
Start: 2020-09-02 | End: 2021-02-25

## 2020-09-04 DIAGNOSIS — E78.01 FAMILIAL HYPERCHOLESTEROLEMIA: ICD-10-CM

## 2020-09-04 RX ORDER — ATORVASTATIN CALCIUM 10 MG/1
TABLET, FILM COATED ORAL
Qty: 90 TABLET | Refills: 1 | Status: SHIPPED | OUTPATIENT
Start: 2020-09-04 | End: 2021-02-09

## 2020-09-07 ENCOUNTER — TELEPHONE (OUTPATIENT)
Dept: OTHER | Facility: OTHER | Age: 85
End: 2020-09-07

## 2020-09-21 DIAGNOSIS — R60.9 EDEMA, UNSPECIFIED TYPE: ICD-10-CM

## 2020-09-21 DIAGNOSIS — K59.09 CHRONIC CONSTIPATION: ICD-10-CM

## 2020-09-21 DIAGNOSIS — D50.9 IRON DEFICIENCY ANEMIA, UNSPECIFIED IRON DEFICIENCY ANEMIA TYPE: ICD-10-CM

## 2020-09-21 RX ORDER — POLYETHYLENE GLYCOL 3350 17 G/17G
POWDER, FOR SOLUTION ORAL
Qty: 510 G | Refills: 1 | Status: SHIPPED | OUTPATIENT
Start: 2020-09-21 | End: 2020-11-25

## 2020-09-21 RX ORDER — FERROUS SULFATE TAB EC 324 MG (65 MG FE EQUIVALENT) 324 (65 FE) MG
TABLET DELAYED RESPONSE ORAL
Qty: 30 TABLET | Refills: 0 | Status: SHIPPED | OUTPATIENT
Start: 2020-09-21 | End: 2020-11-25

## 2020-09-22 ENCOUNTER — OFFICE VISIT (OUTPATIENT)
Dept: CARDIOLOGY CLINIC | Facility: CLINIC | Age: 85
End: 2020-09-22
Payer: MEDICARE

## 2020-09-22 VITALS
BODY MASS INDEX: 25.75 KG/M2 | HEART RATE: 78 BPM | DIASTOLIC BLOOD PRESSURE: 54 MMHG | HEIGHT: 68 IN | TEMPERATURE: 97.7 F | SYSTOLIC BLOOD PRESSURE: 136 MMHG | WEIGHT: 169.9 LBS

## 2020-09-22 DIAGNOSIS — E78.5 DYSLIPIDEMIA: ICD-10-CM

## 2020-09-22 DIAGNOSIS — I44.7 LEFT BUNDLE-BRANCH BLOCK: Primary | ICD-10-CM

## 2020-09-22 DIAGNOSIS — I10 ESSENTIAL HYPERTENSION: ICD-10-CM

## 2020-09-22 DIAGNOSIS — I48.0 PAROXYSMAL ATRIAL FIBRILLATION (HCC): ICD-10-CM

## 2020-09-22 PROCEDURE — 93000 ELECTROCARDIOGRAM COMPLETE: CPT | Performed by: INTERNAL MEDICINE

## 2020-09-22 PROCEDURE — 99214 OFFICE O/P EST MOD 30 MIN: CPT | Performed by: INTERNAL MEDICINE

## 2020-09-22 RX ORDER — POTASSIUM CHLORIDE 20 MEQ/1
TABLET, EXTENDED RELEASE ORAL
Qty: 30 TABLET | Refills: 3 | Status: SHIPPED | OUTPATIENT
Start: 2020-09-22 | End: 2021-01-19

## 2020-09-22 NOTE — PROGRESS NOTES
Cardiology Follow Up    Amarilis Farrell  1/19/1927  [id number]  [address] [facility]  [address] [place] [address] [address]    1  Left bundle-branch block     2  Paroxysmal atrial fibrillation (HCC)     3  Essential hypertension     4  Dyslipidemia         Discussion/Summary:   His lower extremity edema has been improving with low-dose Lasix  I have told him on  A bad day can take an extra tablet  I reviewed his recent echocardiogram shows preserved LV systolic function no significant valvular disease  Blood pressures been well controlled  Lipids have been stable  I have made no other changes to his regimen  Given his advanced age will continue more conservative approach  Interval History:   80year-old gentleman with paroxysmal atrial fibrillation currently maintaining sinus rhythm on anticoagulation, hypertension, hyperlipidemia, incomplete left bundle branch block presents for routine scheduled follow-up appointment  he presents for routine yearly follow-up visit  He continues to remain quite active for his age he states he was walking 18 holes of golf not that long ago  Since her last visit he has developed some lower extremity edema  He had his methotrexate dose reduced for rheumatoid arthritis and has developed significant stiffness of his body  He has a hard time getting going in the morning and has a hard time initiating exercise  He feels that his functional capacity has been going down  Overall he has been doing better since our last visit  He remains active at at home  He has been doing well on Eliquis and is off Coumadin  His lower extremity edema has improved with low-dose Lasix  No signs of any significant valvular disease or  Reduction in LV function on recent echo  He still has chronic complaints of arthritis which have been stable    He is taking all medications as prescribed      Problem List     Atrial fibrillation ([place] [address] ) [I48 [id number]]    Arthritis    Overview Signed 3/25/2018 11:55 AM by Brian Tripathi MD     Description: DJD         Benign prostatic hyperplasia    Chronic constipation    Diabetes mellitus type II, controlled ([place] [address] )    Diverticulosis    Overview Signed 3/25/2018 11:55 AM by Brian Tripathi MD     Description: C BLEEDING         Dyslipidemia    History of prostate cancer    Hypertension    Impaired fasting glucose    Left bundle-branch block    Overview Signed 3/25/2018 11:55 AM by Brian Tripathi MD     Description: CHRONIC         Paroxysmal atrial fibrillation (HCC)    Rheumatoid arthritis ([place] [address] )    Seropositive rheumatoid arthritis ([place] [address] )    Spondylosis of cervical region without myelopathy or radiculopathy    Venous insufficiency        Past Medical History:   Diagnosis Date    A-fib ([place] [address] )     Basal cell carcinoma     10/9/15    Malignant neoplasm of prostate ([place] [address] )     10/9/15    Rheumatoid arthritis ([place] [address] )      Social History     Socioeconomic History    Marital status: Single     Spouse name: Not on file    Number of children: Not on file    Years of education: Not on file    Highest education level: Not on file   Occupational History    Occupation: Teaching   Social Needs    Financial resource strain: Not on file    Food insecurity     Worry: Not on file     Inability: Not on file    Transportation needs     Medical: Not on file     Non-medical: Not on file   Tobacco Use    Smoking status: Former Smoker    Smokeless tobacco: Former User   Substance and Sexual Activity    Alcohol use: Yes     Comment: wine    Drug use: No    Sexual activity: Not on file   Lifestyle    Physical activity     Days per week: Not on file     Minutes per session: Not on file    Stress: Not on file   Relationships    Social connections     Talks on phone: Not on file     Gets together: Not on file     Attends [employer] service: Not on file     Active member of club or organization: Not on file     Attends meetings of clubs or organizations: Not on file     Relationship status: Not on file    Intimate partner violence     Fear of current or ex partner: Not on file     Emotionally abused: Not on file     Physically abused: Not on file     Forced sexual activity: Not on file   Other Topics Concern    Not on file   Social History Narrative    Not on file      Family History   Problem Relation Age of Onset    Diabetes Mother     Lung cancer Mother     Heart disease Father     Stroke Father         syndrome     Past Surgical History:   Procedure Laterality Date    CATARACT EXTRACTION      2000    COLONOSCOPY N/A 4/30/2019    Procedure: COLONOSCOPY;  Surgeon: Winter Douglas MD;  Location: [facility];   Service: Colorectal    [place]ON[place]GIOP[place] WITH [place]      [place] [facility] [place]    HEMORRHOID SURGERY      HERNIA REPAIR      PROSTATE SURGERY         Current Outpatient Medications:     amLODIPine (NORVASC) 5 mg tablet, Take 1 tablet (5 mg total) by mouth daily, Disp: 90 tablet, Rfl: 1    apixaban (ELIQUIS) 5 mg, Take 1 tablet (5 mg total) by mouth 2 (two) times a day, Disp: 180 tablet, Rfl: 3    ARTIFICIAL TEARS 0 1-0 3 % SOLN, Apply 1 drop to eye 2 (two) times a day, Disp: , Rfl:     aspirin 81 MG tablet, Take 1 tablet by mouth daily, Disp: , Rfl:     atorvastatin (LIPITOR) 10 mg tablet, TAKE 1 TABLET BY MOUTH  DAILY AT BEDTIME, Disp: 90 tablet, Rfl: 1    Cholecalciferol 2000 units CAPS, Take 1 capsule by mouth daily, Disp: , Rfl:     ferrous sulfate 324 (65 Fe) mg, TAKE ONE TABLET BY MOUTH EVERY OTHER DAY, Disp: 30 tablet, Rfl: 0    folic acid (FOLVITE) 1 mg tablet, Take 2 mg by mouth daily , Disp: , Rfl:     furosemide (LASIX) 20 mg tablet, TAKE ONE TABLET BY MOUTH EVERY DAY, Disp: 30 tablet, Rfl: 2    latanoprost (XALATAN) 0 005 % ophthalmic solution, Apply 0 005 % to eye daily, Disp: , Rfl:     lisinopril (ZESTRIL) 10 mg tablet, TAKE 1 TABLET BY MOUTH  DAILY, Disp: 90 tablet, Rfl: 1    methotrexate 2 5 mg tablet, Take 7 5 mg by mouth every 7 days , Disp: , Rfl:     Multiple Vitamin tablet, Take 1 tablet by mouth daily, Disp: , Rfl:     Omega-3 Fatty Acids (FISH OIL CONCENTRATE) 1000 MG CAPS, Take 4 capsules by mouth daily, Disp: , Rfl:     potassium chloride (K-DUR,KLOR-CON) 20 mEq tablet, TAKE ONE TABLET BY MOUTH EVERY DAY, Disp: 30 tablet, Rfl: 3    polyethylene glycol (GLYCOLAX) 17 GM/SCOOP powder, TAKE 17 GRAMS BY MOUTH DAILY, Disp: 510 g, Rfl: 1  No Known Allergies    Labs:     Chemistry        Component Value Date/Time     12/02/2015 0819    K 4 0 06/15/2020 0803    K 3 8 12/02/2015 0819     06/15/2020 0803     (H) 12/02/2015 0819    CO2 24 06/15/2020 0803    CO2 26 12/02/2015 0819    BUN 19 06/15/2020 0803    BUN 17 12/02/2015 0819    CREATININE 0 95 06/15/2020 0803    CREATININE 0 82 12/02/2015 0819        Component Value Date/Time    CALCIUM 9 0 06/15/2020 0803    CALCIUM 8 4 12/02/2015 0819    ALKPHOS 64 06/08/2020 1727    ALKPHOS 68 12/02/2015 0819    AST 18 06/08/2020 1727    AST 14 12/02/2015 0819    ALT 20 06/08/2020 1727    ALT 35 12/02/2015 0819    BILITOT 0 49 12/02/2015 0819            No results found for: CHOL  Lab Results   Component Value Date    HDL 48 05/29/2020    HDL 72 02/10/2020    HDL 68 (H) 07/24/2019     Lab Results   Component Value Date    LDLCALC 62 05/29/2020    LDLCALC 70 02/10/2020    LDLCALC 64 07/24/2019     Lab Results   Component Value Date    TRIG 48 05/29/2020    TRIG 50 02/10/2020    TRIG 58 07/24/2019     No results found for: CHOLHDL    Imaging: No results found  ECG:    Sinus rhythm 1st degree AV block nonspecific interventricular conduction delay  ROS    Vitals:    09/22/20 1309   BP: 136/54   Pulse: 78   Temp: 97 7 °F (36 5 °C)     Vitals:    09/22/20 1309   Weight: 77 1 kg (169 lb 14 4 oz)     Height: 5' 8" (172 7 cm)   Body mass index is 25 83 kg/m²      Physical Exam:  Vital signs reviewed  General:  Alert and cooperative, appears stated age, no acute distress  HEENT:  PERRLA, EOMI, no scleral icterus, no conjunctival pallor  Neck:  No lymphadenopathy, no thyromegaly, no carotid bruits, no elevated JVP  Heart:  Regular rate and rhythm, normal S1/S2, no S3/S4, no murmur, rubs or gallops  PMI nondisplaced  Lungs:  Clear to auscultation bilaterally, no wheezes rales or rhonchi  Abdomen:  Soft, non-tender, positive bowel sounds, no rebound or guarding,   no organomegaly   Extremities:  Normal range of motion  No clubbing, cyanosis     Plus one edema   Vascular:  2+ pedal pulses  Skin:  No rashes or lesions on exposed skin  Neurologic:  Cranial nerves II-XII grossly intact without focal deficits  Psych:  Normal mood and affect

## 2020-10-12 DIAGNOSIS — I10 ESSENTIAL HYPERTENSION: ICD-10-CM

## 2020-10-13 RX ORDER — AMLODIPINE BESYLATE 5 MG/1
TABLET ORAL
Qty: 90 TABLET | Refills: 1 | Status: SHIPPED | OUTPATIENT
Start: 2020-10-13 | End: 2021-03-25

## 2020-10-14 ENCOUNTER — OFFICE VISIT (OUTPATIENT)
Dept: UROLOGY | Facility: AMBULATORY SURGERY CENTER | Age: 85
End: 2020-10-14
Payer: MEDICARE

## 2020-10-14 VITALS
WEIGHT: 167.2 LBS | BODY MASS INDEX: 25.34 KG/M2 | SYSTOLIC BLOOD PRESSURE: 120 MMHG | HEIGHT: 68 IN | HEART RATE: 58 BPM | TEMPERATURE: 98.2 F | DIASTOLIC BLOOD PRESSURE: 60 MMHG

## 2020-10-14 DIAGNOSIS — Z85.46 HISTORY OF PROSTATE CANCER: ICD-10-CM

## 2020-10-14 PROCEDURE — 99213 OFFICE O/P EST LOW 20 MIN: CPT | Performed by: UROLOGY

## 2020-10-27 ENCOUNTER — OFFICE VISIT (OUTPATIENT)
Dept: INTERNAL MEDICINE CLINIC | Facility: CLINIC | Age: 85
End: 2020-10-27
Payer: MEDICARE

## 2020-10-27 VITALS
BODY MASS INDEX: 25.52 KG/M2 | TEMPERATURE: 98.2 F | HEIGHT: 68 IN | SYSTOLIC BLOOD PRESSURE: 152 MMHG | DIASTOLIC BLOOD PRESSURE: 63 MMHG | RESPIRATION RATE: 14 BRPM | WEIGHT: 168.4 LBS | HEART RATE: 80 BPM

## 2020-10-27 DIAGNOSIS — I48.0 PAROXYSMAL ATRIAL FIBRILLATION (HCC): ICD-10-CM

## 2020-10-27 DIAGNOSIS — I87.2 VENOUS INSUFFICIENCY: ICD-10-CM

## 2020-10-27 DIAGNOSIS — E11.69 TYPE 2 DIABETES MELLITUS WITH OTHER SPECIFIED COMPLICATION, UNSPECIFIED WHETHER LONG TERM INSULIN USE (HCC): ICD-10-CM

## 2020-10-27 DIAGNOSIS — I10 ESSENTIAL HYPERTENSION: Primary | ICD-10-CM

## 2020-10-27 LAB — SL AMB POCT HEMOGLOBIN AIC: 7.2 (ref ?–6.5)

## 2020-10-27 PROCEDURE — 83036 HEMOGLOBIN GLYCOSYLATED A1C: CPT | Performed by: INTERNAL MEDICINE

## 2020-10-27 PROCEDURE — 99214 OFFICE O/P EST MOD 30 MIN: CPT | Performed by: INTERNAL MEDICINE

## 2020-11-23 DIAGNOSIS — D50.9 IRON DEFICIENCY ANEMIA, UNSPECIFIED IRON DEFICIENCY ANEMIA TYPE: ICD-10-CM

## 2020-11-23 DIAGNOSIS — K59.09 CHRONIC CONSTIPATION: ICD-10-CM

## 2020-11-23 DIAGNOSIS — R60.9 EDEMA, UNSPECIFIED TYPE: ICD-10-CM

## 2020-11-23 RX ORDER — FUROSEMIDE 20 MG/1
TABLET ORAL
Qty: 30 TABLET | Refills: 2 | Status: SHIPPED | OUTPATIENT
Start: 2020-11-23 | End: 2021-01-22

## 2020-11-25 DIAGNOSIS — K59.09 CHRONIC CONSTIPATION: ICD-10-CM

## 2020-11-25 RX ORDER — POLYETHYLENE GLYCOL 3350 17 G/17G
17 POWDER, FOR SOLUTION ORAL DAILY PRN
Qty: 510 G | Refills: 1 | Status: SHIPPED | OUTPATIENT
Start: 2020-11-25 | End: 2021-01-19

## 2020-11-25 RX ORDER — POLYETHYLENE GLYCOL 3350 17 G/17G
POWDER, FOR SOLUTION ORAL
Qty: 225 G | Refills: 1 | Status: SHIPPED | OUTPATIENT
Start: 2020-11-25 | End: 2021-03-30

## 2020-11-25 RX ORDER — FERROUS SULFATE TAB EC 324 MG (65 MG FE EQUIVALENT) 324 (65 FE) MG
TABLET DELAYED RESPONSE ORAL
Qty: 30 TABLET | Refills: 0 | Status: SHIPPED | OUTPATIENT
Start: 2020-11-25 | End: 2021-01-22

## 2020-12-02 DIAGNOSIS — D50.9 IRON DEFICIENCY ANEMIA, UNSPECIFIED IRON DEFICIENCY ANEMIA TYPE: ICD-10-CM

## 2020-12-02 RX ORDER — FERROUS SULFATE TAB EC 324 MG (65 MG FE EQUIVALENT) 324 (65 FE) MG
TABLET DELAYED RESPONSE ORAL
Qty: 30 TABLET | Refills: 0 | OUTPATIENT
Start: 2020-12-02

## 2021-01-19 DIAGNOSIS — R60.9 EDEMA, UNSPECIFIED TYPE: ICD-10-CM

## 2021-01-19 DIAGNOSIS — D50.9 IRON DEFICIENCY ANEMIA, UNSPECIFIED IRON DEFICIENCY ANEMIA TYPE: ICD-10-CM

## 2021-01-19 DIAGNOSIS — K59.09 CHRONIC CONSTIPATION: ICD-10-CM

## 2021-01-19 RX ORDER — POTASSIUM CHLORIDE 20 MEQ/1
TABLET, EXTENDED RELEASE ORAL
Qty: 30 TABLET | Refills: 3 | Status: SHIPPED | OUTPATIENT
Start: 2021-01-19 | End: 2021-05-23 | Stop reason: SDUPTHER

## 2021-01-19 RX ORDER — FERROUS SULFATE TAB EC 324 MG (65 MG FE EQUIVALENT) 324 (65 FE) MG
TABLET DELAYED RESPONSE ORAL
Qty: 30 TABLET | Refills: 0 | OUTPATIENT
Start: 2021-01-19

## 2021-01-19 RX ORDER — POLYETHYLENE GLYCOL 3350 17 G/17G
POWDER, FOR SOLUTION ORAL
Qty: 250 G | Refills: 1 | Status: SHIPPED | OUTPATIENT
Start: 2021-01-19 | End: 2021-03-23

## 2021-01-20 DIAGNOSIS — Z23 ENCOUNTER FOR IMMUNIZATION: ICD-10-CM

## 2021-01-22 DIAGNOSIS — R60.9 EDEMA, UNSPECIFIED TYPE: ICD-10-CM

## 2021-01-22 DIAGNOSIS — D50.9 IRON DEFICIENCY ANEMIA, UNSPECIFIED IRON DEFICIENCY ANEMIA TYPE: ICD-10-CM

## 2021-01-22 RX ORDER — FUROSEMIDE 20 MG/1
TABLET ORAL
Qty: 30 TABLET | Refills: 2 | Status: SHIPPED | OUTPATIENT
Start: 2021-01-22 | End: 2021-02-10

## 2021-01-22 RX ORDER — FERROUS SULFATE TAB EC 324 MG (65 MG FE EQUIVALENT) 324 (65 FE) MG
TABLET DELAYED RESPONSE ORAL
Qty: 30 TABLET | Refills: 0 | Status: SHIPPED | OUTPATIENT
Start: 2021-01-22 | End: 2021-03-23

## 2021-01-25 ENCOUNTER — IMMUNIZATIONS (OUTPATIENT)
Dept: FAMILY MEDICINE CLINIC | Facility: HOSPITAL | Age: 86
End: 2021-01-25

## 2021-01-25 DIAGNOSIS — Z23 ENCOUNTER FOR IMMUNIZATION: Primary | ICD-10-CM

## 2021-01-25 PROCEDURE — 91300 SARS-COV-2 / COVID-19 MRNA VACCINE (PFIZER-BIONTECH) 30 MCG: CPT

## 2021-01-25 PROCEDURE — 0001A SARS-COV-2 / COVID-19 MRNA VACCINE (PFIZER-BIONTECH) 30 MCG: CPT

## 2021-02-09 DIAGNOSIS — E78.01 FAMILIAL HYPERCHOLESTEROLEMIA: ICD-10-CM

## 2021-02-09 RX ORDER — ATORVASTATIN CALCIUM 10 MG/1
TABLET, FILM COATED ORAL
Qty: 90 TABLET | Refills: 1 | Status: SHIPPED | OUTPATIENT
Start: 2021-02-09 | End: 2021-06-15

## 2021-02-10 DIAGNOSIS — R60.9 EDEMA, UNSPECIFIED TYPE: ICD-10-CM

## 2021-02-10 RX ORDER — FUROSEMIDE 20 MG/1
20 TABLET ORAL DAILY
Qty: 90 TABLET | Refills: 2 | Status: SHIPPED | OUTPATIENT
Start: 2021-02-10 | End: 2021-02-11

## 2021-02-11 DIAGNOSIS — R60.9 EDEMA, UNSPECIFIED TYPE: ICD-10-CM

## 2021-02-11 RX ORDER — FUROSEMIDE 20 MG/1
20 TABLET ORAL DAILY
Qty: 90 TABLET | Refills: 2 | Status: SHIPPED | OUTPATIENT
Start: 2021-02-11 | End: 2021-02-19

## 2021-02-13 ENCOUNTER — IMMUNIZATIONS (OUTPATIENT)
Dept: FAMILY MEDICINE CLINIC | Facility: HOSPITAL | Age: 86
End: 2021-02-13

## 2021-02-13 DIAGNOSIS — Z23 ENCOUNTER FOR IMMUNIZATION: Primary | ICD-10-CM

## 2021-02-13 PROCEDURE — 0002A SARS-COV-2 / COVID-19 MRNA VACCINE (PFIZER-BIONTECH) 30 MCG: CPT

## 2021-02-13 PROCEDURE — 91300 SARS-COV-2 / COVID-19 MRNA VACCINE (PFIZER-BIONTECH) 30 MCG: CPT

## 2021-02-18 DIAGNOSIS — R60.9 EDEMA, UNSPECIFIED TYPE: ICD-10-CM

## 2021-02-19 RX ORDER — FUROSEMIDE 20 MG/1
TABLET ORAL
Qty: 30 TABLET | Refills: 2 | Status: SHIPPED | OUTPATIENT
Start: 2021-02-19 | End: 2021-09-20

## 2021-02-25 DIAGNOSIS — I10 ESSENTIAL HYPERTENSION: ICD-10-CM

## 2021-02-25 RX ORDER — LISINOPRIL 10 MG/1
TABLET ORAL
Qty: 90 TABLET | Refills: 1 | Status: SHIPPED | OUTPATIENT
Start: 2021-02-25 | End: 2021-07-15

## 2021-03-12 DIAGNOSIS — I48.0 PAF (PAROXYSMAL ATRIAL FIBRILLATION) (HCC): ICD-10-CM

## 2021-03-12 RX ORDER — APIXABAN 5 MG/1
TABLET, FILM COATED ORAL
Qty: 180 TABLET | Refills: 3 | Status: SHIPPED | OUTPATIENT
Start: 2021-03-12

## 2021-03-23 DIAGNOSIS — D50.9 IRON DEFICIENCY ANEMIA, UNSPECIFIED IRON DEFICIENCY ANEMIA TYPE: ICD-10-CM

## 2021-03-23 DIAGNOSIS — K59.09 CHRONIC CONSTIPATION: ICD-10-CM

## 2021-03-23 DIAGNOSIS — I10 ESSENTIAL HYPERTENSION: ICD-10-CM

## 2021-03-23 RX ORDER — FERROUS SULFATE TAB EC 324 MG (65 MG FE EQUIVALENT) 324 (65 FE) MG
TABLET DELAYED RESPONSE ORAL
Qty: 30 TABLET | Refills: 0 | Status: SHIPPED | OUTPATIENT
Start: 2021-03-23 | End: 2021-03-30

## 2021-03-23 RX ORDER — POLYETHYLENE GLYCOL 3350 17 G/17G
POWDER, FOR SOLUTION ORAL
Qty: 500 G | Refills: 1 | Status: SHIPPED | OUTPATIENT
Start: 2021-03-23 | End: 2021-05-20

## 2021-03-25 RX ORDER — AMLODIPINE BESYLATE 5 MG/1
TABLET ORAL
Qty: 90 TABLET | Refills: 0 | Status: SHIPPED | OUTPATIENT
Start: 2021-03-25 | End: 2021-06-15

## 2021-03-30 ENCOUNTER — OFFICE VISIT (OUTPATIENT)
Dept: INTERNAL MEDICINE CLINIC | Facility: CLINIC | Age: 86
End: 2021-03-30
Payer: MEDICARE

## 2021-03-30 VITALS
HEART RATE: 72 BPM | WEIGHT: 168.2 LBS | TEMPERATURE: 97.2 F | RESPIRATION RATE: 16 BRPM | SYSTOLIC BLOOD PRESSURE: 126 MMHG | HEIGHT: 68 IN | DIASTOLIC BLOOD PRESSURE: 62 MMHG | BODY MASS INDEX: 25.49 KG/M2 | OXYGEN SATURATION: 99 %

## 2021-03-30 DIAGNOSIS — M05.9 SEROPOSITIVE RHEUMATOID ARTHRITIS (HCC): ICD-10-CM

## 2021-03-30 DIAGNOSIS — I48.0 PAROXYSMAL ATRIAL FIBRILLATION (HCC): ICD-10-CM

## 2021-03-30 DIAGNOSIS — Z00.00 MEDICARE ANNUAL WELLNESS VISIT, SUBSEQUENT: Primary | ICD-10-CM

## 2021-03-30 DIAGNOSIS — D63.8 ANEMIA IN OTHER CHRONIC DISEASES CLASSIFIED ELSEWHERE: ICD-10-CM

## 2021-03-30 DIAGNOSIS — I10 ESSENTIAL HYPERTENSION: ICD-10-CM

## 2021-03-30 DIAGNOSIS — E11.9 CONTROLLED TYPE 2 DIABETES MELLITUS WITHOUT COMPLICATION, WITHOUT LONG-TERM CURRENT USE OF INSULIN (HCC): ICD-10-CM

## 2021-03-30 DIAGNOSIS — E78.5 DYSLIPIDEMIA: ICD-10-CM

## 2021-03-30 DIAGNOSIS — C61 MALIGNANT NEOPLASM OF PROSTATE (HCC): ICD-10-CM

## 2021-03-30 DIAGNOSIS — E11.69 TYPE 2 DIABETES MELLITUS WITH OTHER SPECIFIED COMPLICATION, UNSPECIFIED WHETHER LONG TERM INSULIN USE (HCC): ICD-10-CM

## 2021-03-30 PROCEDURE — 1123F ACP DISCUSS/DSCN MKR DOCD: CPT | Performed by: INTERNAL MEDICINE

## 2021-03-30 PROCEDURE — 99214 OFFICE O/P EST MOD 30 MIN: CPT | Performed by: INTERNAL MEDICINE

## 2021-03-30 PROCEDURE — G0439 PPPS, SUBSEQ VISIT: HCPCS | Performed by: INTERNAL MEDICINE

## 2021-03-30 NOTE — PATIENT INSTRUCTIONS

## 2021-03-30 NOTE — PROGRESS NOTES
Assessment and Plan:     Problem List Items Addressed This Visit     None           Preventive health issues were discussed with patient, and age appropriate screening tests were ordered as noted in patient's After Visit Summary  Personalized health advice and appropriate referrals for health education or preventive services given if needed, as noted in patient's After Visit Summary  History of Present Illness:     Patient presents for Medicare Annual Wellness visit    Patient Care Team:  Nicky Vee MD as PCP - General (Internal Medicine)  DO Lizzie Orosco, DO Sanjay Farrell MD as Endoscopist     Problem List:     Patient Active Problem List   Diagnosis    Benign prostatic hyperplasia    Chronic constipation    [facility], [facility]    Diverticulosis    Dyslipidemia    History of prostate cancer    Hypertension    Left bundle-branch block    Paroxysmal atrial fibrillation ([facility] )    Seropositive rheumatoid arthritis ([place] [address] )    Spondylosis of cervical region without myelopathy or radiculopathy    Venous insufficiency    Primary osteoarthritis involving multiple joints    Chronic gout involving toe of right foot without tophus    Anemia due to acute blood loss    Iron deficiency anemia    Type 2 diabetes mellitus with other specified complication [facility])      Past Medical and Surgical History:     Past Medical History:   Diagnosis Date    A-fib [facility])     Basal cell carcinoma     10/9/15    Malignant neoplasm of prostate ([facility] )     10/9/15    Rheumatoid arthritis ([place] [address] )      Past Surgical History:   Procedure Laterality Date    CATARACT EXTRACTION      2000    COLONOSCOPY N/A 4/30/2019    Procedure: COLONOSCOPY;  Surgeon: Sanjay Farrell MD;  [facility]: [facility];   Service: Colorectal    CORONARY ANGIOPLASTY WITH STENT PLACEMENT      7/2011, [facility]    HEMORRHOID SURGERY      HERNIA REPAIR      PROSTATE SURGERY        Family History:     Family History   Problem Relation Age of Onset    Diabetes Mother     Lung cancer Mother     Heart disease Father     Stroke Father         syndrome      Social History:        Social History     Socioeconomic History    Marital status: Single     Spouse name: None    Number of children: None    Years of education: None    Highest education level: None   Occupational History    Occupation: Teaching   Social Needs    Financial resource strain: None    Food insecurity     Worry: None     Inability: None    Transportation needs     Medical: None     Non-medical: None   Tobacco Use    Smoking status: Former Smoker    Smokeless tobacco: Former User   Substance and Sexual Activity    Alcohol use: Yes     Comment: wine    Drug use: No    Sexual activity: None   Lifestyle    Physical activity     Days per week: None     Minutes per session: None    Stress: None   Relationships    Social connections     Talks on phone: None     Gets together: None     Attends Taoism service: None     Active member of club or organization: None     Attends meetings of clubs or organizations: None     Relationship status: None    Intimate partner violence     Fear of current or ex partner: None     Emotionally abused: None     Physically abused: None     Forced sexual activity: None   Other Topics Concern    None   Social History Narrative    None      Medications and Allergies:     Current Outpatient Medications   Medication Sig Dispense Refill    amLODIPine (NORVASC) 5 mg tablet TAKE 1 TABLET BY MOUTH  DAILY 90 tablet 0    ARTIFICIAL TEARS 0 1-0 3 % SOLN Apply 1 drop to eye 2 (two) times a day      aspirin 81 MG tablet Take 1 tablet by mouth daily      atorvastatin (LIPITOR) 10 mg tablet TAKE 1 TABLET BY MOUTH  DAILY AT BEDTIME 90 tablet 1    Cholecalciferol 2000 units CAPS Take 1 capsule by mouth daily      Eliquis 5 MG TAKE 1 TABLET BY MOUTH  TWICE DAILY 180 tablet 3    ferrous sulfate 324 (65 Fe) mg TAKE ONE TABLET BY MOUTH EVERY OTHER DAY 30 tablet 0    folic acid (FOLVITE) 1 mg tablet Take 2 mg by mouth daily       furosemide (LASIX) 20 mg tablet TAKE ONE TABLET BY MOUTH EVERY DAY 30 tablet 2    latanoprost (XALATAN) 0 005 % ophthalmic solution Apply 0 005 % to eye daily      lisinopril (ZESTRIL) 10 mg tablet TAKE 1 TABLET BY MOUTH  DAILY 90 tablet 1    methotrexate 2 5 mg tablet Take 7 5 mg by mouth every 7 days       Multiple Vitamin tablet Take 1 tablet by mouth daily      Omega-3 Fatty Acids (FISH OIL CONCENTRATE) 1000 MG CAPS Take 4 capsules by mouth daily      polyethylene glycol (GLYCOLAX) 17 GM/SCOOP powder MIX AND TAKE 17 GRAMS AS DIRECTED BY MOUTH ONCE DAILY 225 g 1    potassium chloride (K-DUR,KLOR-CON) 20 mEq tablet TAKE ONE TABLET BY MOUTH EVERY DAY 30 tablet 3    polyethylene glycol (GLYCOLAX) 17 GM/SCOOP powder TAKE 17GM BY MOUTH DAILY AS NEEDED FOR CONSTIPATION (Patient not taking: Reported on 3/30/2021) 500 g 1     No current facility-administered medications for this visit  No Known Allergies   Immunizations:     Immunization History   Administered Date(s) Administered    INFLUENZA 10/13/2006, 09/18/2009, 10/16/2013, 01/05/2015, 10/26/2016, 10/08/2017, 09/23/2020, 09/25/2020    Influenza Split High Dose Preservative Free IM 10/01/2012, 10/24/2014, 10/09/2015, 11/01/2016, 10/01/2017    Influenza, high dose seasonal 0 7 mL 10/11/2018, 09/25/2020    Pneumococcal Conjugate 13-Valent 10/09/2015    Pneumococcal Polysaccharide PPV23 10/01/2008    SARS-CoV-2 / COVID-19 mRNA IM (Pf[employer]NT[employer]) 01/25/2021, 02/13/2021    Tdap 08/28/2017    influenza, trivalent, adjuvanted 09/17/2019      Health Maintenance: There are no preventive care reminders to display for this patient  There are no preventive care reminders to display for this patient     Medicare Health Risk Assessment:     /62 (BP Location: Left arm, Patient Position: Sitting, Cuff Size: Standard) Pulse 72   Temp (!) 97 2 °F (36 2 °C)   Resp 16   Ht 5' 8" (1 727 m)   Wt 76 3 kg (168 lb 3 2 oz)   SpO2 99%   BMI 25 57 kg/m²      Michelle Chino is here for his Subsequent Wellness visit  Health Risk Assessment:   Patient rates overall health as good  Patient feels that their physical health rating is much better  Patient is very satisfied with their life  Eyesight was rated as same  Hearing was rated as same  Patient feels that their emotional and mental health rating is same  Patients states they are never, rarely angry  Patient states they are sometimes unusually tired/fatigued  Pain experienced in the last 7 days has been some  Patient's pain rating has been 9/10  Patient states that he has experienced no weight loss or gain in last 6 months  Depression Screening:   PHQ-2 Score: 0      Fall Risk Screening: In the past year, patient has experienced: no history of falling in past year      Home Safety:  Patient does not have trouble with stairs inside or outside of their home  Patient has working smoke alarms and has working carbon monoxide detector  Home safety hazards include: none  Nutrition:   Current diet is Regular and Limited junk food  Medications:   Patient is currently taking over-the-counter supplements  OTC medications include: see medication list  Patient is able to manage medications  Activities of Daily Living (ADLs)/Instrumental Activities of Daily Living (IADLs):   Walk and transfer into and out of bed and chair?: Yes  Dress and groom yourself?: Yes    Bathe or shower yourself?: Yes    Feed yourself? Yes  Do your laundry/housekeeping?: Yes  Manage your money, pay your bills and track your expenses?: Yes  Make your own meals?: No    Do your own shopping?: Yes    Previous Hospitalizations:   Any hospitalizations or ED visits within the last 12 months?: No      Advance Care Planning:   Living will: Yes    Durable POA for healthcare: Yes    Advanced directive:  Yes PREVENTIVE SCREENINGS      Cardiovascular Screening:    General: Screening Not Indicated and History Lipid Disorder      Diabetes Screening:     General: Screening Not Indicated and History Diabetes      Colorectal Cancer Screening:     General: Screening Not Indicated      Prostate Cancer Screening:    General: History Prostate Cancer and Screening Not Indicated      Osteoporosis Screening:    General: Screening Not Indicated      Abdominal Aortic Aneurysm (AAA) Screening:    Risk factors include: tobacco use        General: Screening Not Indicated      Lung Cancer Screening:     General: Screening Not Indicated      Hepatitis C Screening:    General: Screening Not Indicated    Screening, Brief Intervention, and Referral to Treatment (SBIRT)    Screening  Typical number of drinks in a day: 0  Typical number of drinks in a week: 2  Interpretation: Low risk drinking behavior      Single Item Drug Screening:  How often have you used an illegal drug (including marijuana) or a prescription medication for non-medical reasons in the past year? never    Single Item Drug Screen Score: 0  Interpretation: Negative screen for possible drug use disorder      Marek Piper MD

## 2021-04-12 NOTE — RESULT NOTES
See ob flow sheet Verified Results  (1) PT WITH INR [id number] 07:55AM Ritesh Dick Order Number: [id number]     Test Name Result Flag Reference   INR 2 74 H 0 86-1 16   PT 29 4 seconds H 12 1-14 4

## 2021-05-20 DIAGNOSIS — K59.09 CHRONIC CONSTIPATION: ICD-10-CM

## 2021-05-20 DIAGNOSIS — D50.9 IRON DEFICIENCY ANEMIA, UNSPECIFIED IRON DEFICIENCY ANEMIA TYPE: ICD-10-CM

## 2021-05-20 DIAGNOSIS — R60.9 EDEMA, UNSPECIFIED TYPE: ICD-10-CM

## 2021-05-20 RX ORDER — POLYETHYLENE GLYCOL 3350 17 G/17G
POWDER, FOR SOLUTION ORAL
Qty: 500 G | Refills: 1 | Status: SHIPPED | OUTPATIENT
Start: 2021-05-20 | End: 2021-07-19

## 2021-05-20 RX ORDER — FERROUS SULFATE TAB EC 324 MG (65 MG FE EQUIVALENT) 324 (65 FE) MG
TABLET DELAYED RESPONSE ORAL
Qty: 30 TABLET | Refills: 0 | Status: SHIPPED | OUTPATIENT
Start: 2021-05-20 | End: 2021-07-27

## 2021-05-23 RX ORDER — POTASSIUM CHLORIDE 20 MEQ/1
TABLET, EXTENDED RELEASE ORAL
Qty: 30 TABLET | Refills: 3 | Status: SHIPPED | OUTPATIENT
Start: 2021-05-23 | End: 2021-08-25 | Stop reason: SDUPTHER

## 2021-06-14 DIAGNOSIS — I10 ESSENTIAL HYPERTENSION: ICD-10-CM

## 2021-06-14 DIAGNOSIS — E78.01 FAMILIAL HYPERCHOLESTEROLEMIA: ICD-10-CM

## 2021-06-15 RX ORDER — ATORVASTATIN CALCIUM 10 MG/1
TABLET, FILM COATED ORAL
Qty: 90 TABLET | Refills: 3 | Status: SHIPPED | OUTPATIENT
Start: 2021-06-15 | End: 2022-05-04

## 2021-06-15 RX ORDER — AMLODIPINE BESYLATE 5 MG/1
TABLET ORAL
Qty: 90 TABLET | Refills: 3 | Status: SHIPPED | OUTPATIENT
Start: 2021-06-15 | End: 2022-05-04

## 2021-07-14 DIAGNOSIS — I10 ESSENTIAL HYPERTENSION: ICD-10-CM

## 2021-07-15 RX ORDER — LISINOPRIL 10 MG/1
TABLET ORAL
Qty: 90 TABLET | Refills: 0 | Status: SHIPPED | OUTPATIENT
Start: 2021-07-15

## 2021-07-18 DIAGNOSIS — K59.09 CHRONIC CONSTIPATION: ICD-10-CM

## 2021-07-19 RX ORDER — POLYETHYLENE GLYCOL 3350 17 G/17G
POWDER, FOR SOLUTION ORAL
Qty: 510 G | Refills: 1 | Status: SHIPPED | OUTPATIENT
Start: 2021-07-19

## 2021-07-27 ENCOUNTER — OFFICE VISIT (OUTPATIENT)
Dept: INTERNAL MEDICINE CLINIC | Facility: CLINIC | Age: 86
End: 2021-07-27
Payer: MEDICARE

## 2021-07-27 VITALS
SYSTOLIC BLOOD PRESSURE: 152 MMHG | WEIGHT: 169.4 LBS | BODY MASS INDEX: 25.67 KG/M2 | HEART RATE: 74 BPM | DIASTOLIC BLOOD PRESSURE: 64 MMHG | HEIGHT: 68 IN | RESPIRATION RATE: 16 BRPM | OXYGEN SATURATION: 98 %

## 2021-07-27 DIAGNOSIS — I87.2 VENOUS INSUFFICIENCY: ICD-10-CM

## 2021-07-27 DIAGNOSIS — M05.9 SEROPOSITIVE RHEUMATOID ARTHRITIS (HCC): ICD-10-CM

## 2021-07-27 DIAGNOSIS — E78.5 DYSLIPIDEMIA: ICD-10-CM

## 2021-07-27 DIAGNOSIS — M1A.9XX0 CHRONIC GOUT INVOLVING TOE OF RIGHT FOOT WITHOUT TOPHUS, UNSPECIFIED CAUSE: ICD-10-CM

## 2021-07-27 DIAGNOSIS — K59.09 CHRONIC CONSTIPATION: ICD-10-CM

## 2021-07-27 DIAGNOSIS — M15.9 PRIMARY OSTEOARTHRITIS INVOLVING MULTIPLE JOINTS: ICD-10-CM

## 2021-07-27 DIAGNOSIS — I10 ESSENTIAL HYPERTENSION: ICD-10-CM

## 2021-07-27 DIAGNOSIS — E11.9 CONTROLLED TYPE 2 DIABETES MELLITUS WITHOUT COMPLICATION, WITHOUT LONG-TERM CURRENT USE OF INSULIN (HCC): Primary | ICD-10-CM

## 2021-07-27 DIAGNOSIS — I48.0 PAROXYSMAL ATRIAL FIBRILLATION (HCC): ICD-10-CM

## 2021-07-27 PROBLEM — M19.041 PRIMARY OSTEOARTHRITIS OF BOTH HANDS: Status: ACTIVE | Noted: 2020-11-16

## 2021-07-27 PROBLEM — M19.042 PRIMARY OSTEOARTHRITIS OF BOTH HANDS: Status: ACTIVE | Noted: 2020-11-16

## 2021-07-27 PROBLEM — Z79.01 CHRONIC ANTICOAGULATION: Status: ACTIVE | Noted: 2020-05-28

## 2021-07-27 LAB — SL AMB POCT HEMOGLOBIN AIC: 6.5 (ref ?–6.5)

## 2021-07-27 PROCEDURE — 83036 HEMOGLOBIN GLYCOSYLATED A1C: CPT | Performed by: INTERNAL MEDICINE

## 2021-07-27 PROCEDURE — 99214 OFFICE O/P EST MOD 30 MIN: CPT | Performed by: INTERNAL MEDICINE

## 2021-07-27 NOTE — PROGRESS NOTES
Assessment/Plan:    1  Controlled type 2 diabetes mellitus without complication, without long-term current use of insulin (HCC)  POCT hemoglobin A1c    CANCELED: Comprehensive metabolic panel    CANCELED: Hemoglobin A1C   2  Essential hypertension  CANCELED: Comprehensive metabolic panel   3  Paroxysmal atrial fibrillation (HCC)  CANCELED: Comprehensive metabolic panel   4  Seropositive rheumatoid arthritis (HCC)  CANCELED: CBC and differential   5  Primary osteoarthritis involving multiple joints     6  Chronic gout involving toe of right foot without tophus, unspecified cause  CANCELED: CBC and differential    CANCELED: Uric acid   7  Dyslipidemia  CANCELED: Lipid panel   8  Venous insufficiency     9  Chronic constipation          A&P Notes:    Ritu Roman  will be establishing care with a new Internist in [address] [place] [address] where he is relocating in the near future  We discussed establishing follow-up with the rheumatologist, cardiologist and urologist thereafter  Subjective:      Patient ID: Lance Mancilla is a 80 y o  male who is here for a follow-up visit  We reviewed his recent lab work and his point of care hemoglobin A1c of 6 5 today  Diabetes is well controlled with diet alone with excellent lifestyle  He has no hyperglycemic symptoms and ophthalmology cares up-to-date with excellent vision  Blood pressure is above optimal today as is usual for office visits  He is admitted to be is home blood pressure readings taking regularly and they are in a well controlled range  Compliance of medicines is excellent  He has had no recent episodes of paroxysmal atrial fibrillation symptomatically  He does follow-up with cardiology once a year and next visit is due in September  He has no palpitations, lightheadedness or syncope    He does have rare episodes of angina characterize as slight squeezing in the center of his chest, relieved with stopping activity, and this only occurs at extreme levels of activity  He remains on aspirin therapy, statin therapy and lisinopril  He is up-to-date this Rheumatology follow-up in his July 12th labs reviewed ordered by rheumatology and appear to be stable with no recent flares of rheumatoid arthritis on methotrexate  He does have some minor osteoarthritis symptoms in the distal interphalangeal joints of his hands with activity such as cough the symptoms are minor  He has had no recent attacks of gout  He is having no clinical bleeding on Eliquis as quite compliant with treatment  Lipids are well controlled on statin therapy  Venous insufficiency has responded very well to low-dose Lasix which has also improved his hypertension  GFR remains optimal   Electrolytes are normal       Chronic constipation responded very well to chair MiraLax will be continued  There are no acute complaints      Past Medical History:   Diagnosis Date    A-fib [facility])     Basal cell carcinoma     10/9/15    [facility] )     10/9/15    Rheumatoid arthritis ([facility] )         Family History   Problem Relation Age of Onset    Diabetes Mother     Lung cancer Mother     Heart disease Father     Stroke Father         syndrome        Social History     Socioeconomic History    Marital status: Single     Spouse name: Not on file    Number of children: Not on file    Years of education: Not on file    Highest education level: Not on file   Occupational History    Occupation: Teaching   Tobacco Use    Smoking status: Former Smoker    Smokeless tobacco: Former User   Substance and Sexual Activity    Alcohol use: Yes     Comment: wine    Drug use: No    Sexual activity: Not on file   Other Topics Concern    Not on file   Social History Narrative    Not on file     Social Determinants of Health     Financial Resource Strain:     Difficulty of Paying Living Expenses:    Food Insecurity:     Worried About Running Out of Food in the Last Year:     Ran Out of Food in the Last Year:    Transportation Needs:     Lack of Transportation (Medical):      Lack of Transportation (Non-Medical):    Physical Activity:     Days of Exercise per Week:     Minutes of Exercise per Session:    Stress:     Feeling of Stress :    Social Connections:     Frequency of Communication with Friends and Family:     Frequency of Social Gatherings with Friends and Family:     Attends [employer] Services:     Active Member of Clubs or Organizations:     Attends Club or Organization Meetings:     Marital Status:    Intimate Partner Violence:     Fear of Current or Ex-Partner:     Emotionally Abused:     Physically Abused:     Sexually Abused:         No Known Allergies     Current Outpatient Medications   Medication Sig Dispense Refill    amLODIPine (NORVASC) 5 mg tablet TAKE 1 TABLET BY MOUTH  DAILY 90 tablet 3    ARTIFICIAL TEARS 0 1-0 3 % SOLN Apply 1 drop to eye 2 (two) times a day      aspirin 81 MG tablet Take 1 tablet by mouth daily      atorvastatin (LIPITOR) 10 mg tablet TAKE 1 TABLET BY MOUTH  DAILY AT BEDTIME 90 tablet 3    Cholecalciferol 2000 units CAPS Take 1 capsule by mouth daily      Eliquis 5 MG TAKE 1 TABLET BY MOUTH  TWICE DAILY 701 tablet 3    folic acid (FOLVITE) 1 mg tablet Take 2 mg by mouth daily       furosemide (LASIX) 20 mg tablet TAKE ONE TABLET BY MOUTH EVERY DAY 30 tablet 2    latanoprost (XALATAN) 0 005 % ophthalmic solution Apply 0 005 % to eye daily      lisinopril (ZESTRIL) 10 mg tablet TAKE 1 TABLET BY MOUTH  DAILY 90 tablet 0    methotrexate 2 5 mg tablet Take 7 5 mg by mouth every 7 days       Multiple Vitamin tablet Take 1 tablet by mouth daily      Omega-3 Fatty Acids (FISH OIL CONCENTRATE) 1000 MG CAPS Take 4 capsules by mouth daily      polyethylene glycol (GLYCOLAX) 17 GM/SCOOP powder TAKE 17 GRAMS BY MOUTH EVERY DAY AS NEEDED FOR CONSTIPATION 510 g 1    potassium chloride (K-DUR,KLOR-CON) 20 mEq tablet TAKE ONE TABLET BY MOUTH EVERY DAY 30 tablet 3     No current facility-administered medications for this visit  Review of Systems as above  With daily exercise, and balance training that he participates in, he reports excellent strength and balance and still playing 9 holes of golf regularly, is also active intellectually and 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 [employer]  Objective:      /64   Pulse 74   Resp 16   Ht 5' 8" (1 727 m)   Wt 76 8 kg (169 lb 6 4 oz)   SpO2 98%   BMI 25 76 kg/m²      Wt Readings from Last 3 Encounters:   07/27/21 76 8 kg (169 lb 6 4 oz)   03/30/21 76 3 kg (168 lb 3 2 oz)   10/27/20 76 4 kg (168 lb 6 4 oz)     Temp Readings from Last 3 Encounters:   03/30/21 (!) 97 2 °F (36 2 °C)   10/27/20 98 2 °F (36 8 °C)   10/14/20 98 2 °F (36 8 °C) (Temporal)     BP Readings from Last 3 Encounters:   07/27/21 152/64   03/30/21 126/62   10/27/20 152/63     Pulse Readings from Last 3 Encounters:   07/27/21 74   03/30/21 72   10/27/20 80       Physical Exam   VSS, afebrile, pulse regular    Alert and Oriented in NAD    HEENT: NCAT, Pupils equal, 3 mm, EOEMI, no icterus or pallor, no iritis or conjunctivitis  No head or neck mass or adenopathy  Neck: supple, no trauma or tenderness  No JVD  Normal carotid upstroke and descent without bruits  Trachea midline without stridor  Thyroid exam normal on inspection and palpation  No spinal tenderness, full range of motion  Lymphatics: no adenopathy throughout    Chest:  No trauma or deformity, no supraclavicular adenopathy or bruit  Chest wall expansion is symmetric and normal, expiratory phase is not prolonged  Heart:  Regular rate and rhythm, normal S1, split S2, no E8-A1, no clicks murmurs or rubs  Lungs:  Clear to auscultation and normal to percussion  Back:  No trauma or deformity, no CVA mass or CVA tenderness      Skin:  No rash or skin malignancy  Abdomen:  Nondistended, normal bowel sounds, soft nontender without masses bruits organomegaly  There is no hernia  Extremities:  Arterial circulation is symmetrically normal with no clubbing cyanosis or edema  There are no varicosities, there is no calf tenderness or phlebitic findings  Joints:  No swelling, redness, tenderness or increased temperature, no instability  There are no tophi  Joint function is remarkably well preserved  Both osteoarthritic changes of the hands and also rheumatoid changes of the hands and wrists evident  Affect:  Normal    Neurologic:  Normal and stable gait, and otherwise no focal findings as well  Cognitive: Intact        I have reviewed pertinent labs:  CBC:   Lab Results   Component Value Date    WBC 7 40 07/21/2020    RBC 3 36 (L) 07/21/2020    HGB 9 5 (L) 07/21/2020    HCT 30 2 (L) 07/21/2020    MCV 90 07/21/2020     07/21/2020    MCH 28 3 07/21/2020    MCHC 31 5 07/21/2020    RDW 16 1 (H) 07/21/2020    MPV 8 4 (L) 07/21/2020    NEUTROABS 5 54 07/21/2020     CMP:   Lab Results   Component Value Date    SODIUM 136 06/15/2020    K 4 0 06/15/2020     06/15/2020    CO2 24 06/15/2020    AGAP 7 06/15/2020    BUN 19 06/15/2020    CREATININE 0 95 06/15/2020    GLUC 179 (H) 06/08/2020    GLUF 182 (H) 06/15/2020    CALCIUM 9 0 06/15/2020    AST 18 06/08/2020    ALT 20 06/08/2020    ALKPHOS 64 06/08/2020    TP 6 9 06/08/2020    ALB 2 3 (L) 06/08/2020    TBILI 0 25 06/08/2020    EGFR 69 06/15/2020     Lipid Profile:   Lab Results   Component Value Date    CHOLESTEROL 120 05/29/2020    HDL 48 05/29/2020    TRIG 48 05/29/2020    LDLCALC 62 05/29/2020    [place] 80 02/10/2020     Thyroid Studies:   Lab Results   Component Value Date    GTA1DHDUYPOO 1 240 10/28/2019     Hemoglobin A1C/EST AVG Glucose   Lab Results   Component Value Date    HGBA1C 6 5 07/27/2021     05/29/2020 July 12th rheumatology labs were also reviewed with low CRP evident

## 2021-08-25 DIAGNOSIS — R60.9 EDEMA, UNSPECIFIED TYPE: ICD-10-CM

## 2021-08-25 RX ORDER — POTASSIUM CHLORIDE 20 MEQ/1
20 TABLET, EXTENDED RELEASE ORAL DAILY
Qty: 30 TABLET | Refills: 3 | Status: SHIPPED | OUTPATIENT
Start: 2021-08-25 | End: 2021-10-21

## 2021-09-18 DIAGNOSIS — R60.9 EDEMA, UNSPECIFIED TYPE: ICD-10-CM

## 2021-09-20 RX ORDER — FUROSEMIDE 20 MG/1
TABLET ORAL
Qty: 90 TABLET | Refills: 3 | Status: SHIPPED | OUTPATIENT
Start: 2021-09-20

## 2021-10-21 DIAGNOSIS — R60.9 EDEMA, UNSPECIFIED TYPE: ICD-10-CM

## 2021-10-21 RX ORDER — POTASSIUM CHLORIDE 20 MEQ/1
TABLET, EXTENDED RELEASE ORAL
Qty: 90 TABLET | Refills: 2 | Status: SHIPPED | OUTPATIENT
Start: 2021-10-21 | End: 2021-12-07 | Stop reason: SDUPTHER

## 2021-12-07 DIAGNOSIS — R60.9 EDEMA, UNSPECIFIED TYPE: ICD-10-CM

## 2021-12-07 RX ORDER — POTASSIUM CHLORIDE 20 MEQ/1
20 TABLET, EXTENDED RELEASE ORAL DAILY
Qty: 90 TABLET | Refills: 0 | Status: SHIPPED | OUTPATIENT
Start: 2021-12-07 | End: 2021-12-07 | Stop reason: SDUPTHER

## 2021-12-07 RX ORDER — POTASSIUM CHLORIDE 20 MEQ/1
20 TABLET, EXTENDED RELEASE ORAL DAILY
Qty: 90 TABLET | Refills: 0 | Status: SHIPPED | OUTPATIENT
Start: 2021-12-07

## 2022-04-30 DIAGNOSIS — E78.01 FAMILIAL HYPERCHOLESTEROLEMIA: ICD-10-CM

## 2022-04-30 DIAGNOSIS — I10 ESSENTIAL HYPERTENSION: ICD-10-CM

## 2022-05-04 RX ORDER — ATORVASTATIN CALCIUM 10 MG/1
TABLET, FILM COATED ORAL
Qty: 90 TABLET | Refills: 3 | Status: SHIPPED | OUTPATIENT
Start: 2022-05-04

## 2022-05-04 RX ORDER — AMLODIPINE BESYLATE 5 MG/1
TABLET ORAL
Qty: 90 TABLET | Refills: 3 | Status: SHIPPED | OUTPATIENT
Start: 2022-05-04

## 2022-08-20 DIAGNOSIS — R60.9 EDEMA, UNSPECIFIED TYPE: ICD-10-CM

## 2022-08-24 RX ORDER — FUROSEMIDE 20 MG/1
TABLET ORAL
Qty: 90 TABLET | Refills: 3 | Status: SHIPPED | OUTPATIENT
Start: 2022-08-24

## 2023-06-24 DIAGNOSIS — R60.9 EDEMA, UNSPECIFIED TYPE: ICD-10-CM

## 2023-06-26 RX ORDER — FUROSEMIDE 20 MG/1
TABLET ORAL
Qty: 90 TABLET | Refills: 0 | Status: SHIPPED | OUTPATIENT
Start: 2023-06-26

## 2023-09-14 DIAGNOSIS — R60.9 EDEMA, UNSPECIFIED TYPE: ICD-10-CM

## 2023-09-14 RX ORDER — FUROSEMIDE 20 MG/1
TABLET ORAL
Qty: 90 TABLET | Refills: 3 | OUTPATIENT
Start: 2023-09-14

## (undated) DEVICE — FIAPC® PROBE W/ FILTER 2200 A OD 2.3MM/6.9FR; L 2.2M/7.2FT: Brand: ERBE